# Patient Record
Sex: FEMALE | Race: WHITE | NOT HISPANIC OR LATINO | URBAN - METROPOLITAN AREA
[De-identification: names, ages, dates, MRNs, and addresses within clinical notes are randomized per-mention and may not be internally consistent; named-entity substitution may affect disease eponyms.]

---

## 2018-02-25 ENCOUNTER — EMERGENCY (EMERGENCY)
Facility: HOSPITAL | Age: 48
LOS: 0 days | Discharge: HOME | End: 2018-02-25

## 2018-02-25 VITALS
SYSTOLIC BLOOD PRESSURE: 160 MMHG | RESPIRATION RATE: 18 BRPM | TEMPERATURE: 98 F | HEART RATE: 98 BPM | DIASTOLIC BLOOD PRESSURE: 96 MMHG | OXYGEN SATURATION: 96 %

## 2018-02-25 DIAGNOSIS — K08.89 OTHER SPECIFIED DISORDERS OF TEETH AND SUPPORTING STRUCTURES: ICD-10-CM

## 2018-02-25 DIAGNOSIS — K05.10 CHRONIC GINGIVITIS, PLAQUE INDUCED: ICD-10-CM

## 2018-02-25 DIAGNOSIS — Z88.0 ALLERGY STATUS TO PENICILLIN: ICD-10-CM

## 2018-02-25 RX ORDER — OXYCODONE AND ACETAMINOPHEN 5; 325 MG/1; MG/1
1 TABLET ORAL ONCE
Qty: 0 | Refills: 0 | Status: DISCONTINUED | OUTPATIENT
Start: 2018-02-25 | End: 2018-02-25

## 2018-02-25 RX ORDER — IBUPROFEN 200 MG
1 TABLET ORAL
Qty: 15 | Refills: 0 | OUTPATIENT
Start: 2018-02-25 | End: 2018-03-01

## 2018-02-25 RX ORDER — CHLORHEXIDINE GLUCONATE 213 G/1000ML
15 SOLUTION TOPICAL
Qty: 1 | Refills: 0 | OUTPATIENT
Start: 2018-02-25 | End: 2018-03-01

## 2018-02-25 RX ORDER — AMOXICILLIN 250 MG/5ML
1 SUSPENSION, RECONSTITUTED, ORAL (ML) ORAL
Qty: 21 | Refills: 0 | OUTPATIENT
Start: 2018-02-25 | End: 2018-03-03

## 2018-02-25 RX ADMIN — OXYCODONE AND ACETAMINOPHEN 1 TABLET(S): 5; 325 TABLET ORAL at 10:39

## 2018-02-25 NOTE — ED PROVIDER NOTE - MEDICAL DECISION MAKING DETAILS
Patient has a private dentist that she will see tomorrow. Acute dental, no abscess, will treat pain meds/abx. Does not wish to have a dental block.    I have discussed the discharge plan with the patient. The patient agrees with the plan, as discussed.  The patient understands Emergency Department diagnosis is a preliminary diagnosis often based on limited information and that the patient must adhere to the follow-up plan as discussed.  The patient understands that if the symptoms worsen or if prescribed medications do not have the desired/planned effect that the patient may return to the Emergency Department at any time for further evaluation and treatment.

## 2018-02-25 NOTE — ED PROVIDER NOTE - NS ED ROS FT
CONST: No fever, chills or bodyaches  EYES: No pain, redness, drainage or visual changes.  ENT: see HPI  MS: No joint pain, back pain or extremity pain/injury  SKIN: No rashes

## 2018-02-25 NOTE — ED PROVIDER NOTE - OBJECTIVE STATEMENT
46yo female presents c/o L upper dental pain x 3 days, progressively worsening, minimally relieved by Naprosyn at home. Patient notes she reports her "gums feel swollen," and she cannot see her dentist until tomorrow. She denies fever, chills, trismus.

## 2018-02-25 NOTE — ED PROVIDER NOTE - PHYSICAL EXAMINATION
CONST: Well appearing in NAD  ENT: No nasal discharge. L upper tenderness along #15, 16 with mild gingivitis. No fx. No abscess  NECK: Non-tender, no meningeal signs  CARD: Normal S1 S2; Normal rate and rhythm  SKIN: Warm, dry, no acute rashes. Good turgor

## 2018-10-07 ENCOUNTER — EMERGENCY (EMERGENCY)
Facility: HOSPITAL | Age: 48
LOS: 1 days | Discharge: HOME | End: 2018-10-07
Attending: EMERGENCY MEDICINE

## 2018-12-23 ENCOUNTER — TRANSCRIPTION ENCOUNTER (OUTPATIENT)
Age: 48
End: 2018-12-23

## 2019-01-15 ENCOUNTER — EMERGENCY (EMERGENCY)
Facility: HOSPITAL | Age: 49
LOS: 0 days | Discharge: HOME | End: 2019-01-15
Attending: EMERGENCY MEDICINE | Admitting: EMERGENCY MEDICINE

## 2019-01-15 VITALS
HEART RATE: 96 BPM | TEMPERATURE: 97 F | SYSTOLIC BLOOD PRESSURE: 123 MMHG | WEIGHT: 207.9 LBS | DIASTOLIC BLOOD PRESSURE: 102 MMHG | RESPIRATION RATE: 20 BRPM | HEIGHT: 67 IN

## 2019-01-15 VITALS
HEART RATE: 75 BPM | SYSTOLIC BLOOD PRESSURE: 141 MMHG | DIASTOLIC BLOOD PRESSURE: 84 MMHG | TEMPERATURE: 97 F | OXYGEN SATURATION: 100 % | RESPIRATION RATE: 18 BRPM

## 2019-01-15 DIAGNOSIS — R07.9 CHEST PAIN, UNSPECIFIED: ICD-10-CM

## 2019-01-15 DIAGNOSIS — Z79.899 OTHER LONG TERM (CURRENT) DRUG THERAPY: ICD-10-CM

## 2019-01-15 DIAGNOSIS — Z79.2 LONG TERM (CURRENT) USE OF ANTIBIOTICS: ICD-10-CM

## 2019-01-15 DIAGNOSIS — I10 ESSENTIAL (PRIMARY) HYPERTENSION: ICD-10-CM

## 2019-01-15 DIAGNOSIS — E78.00 PURE HYPERCHOLESTEROLEMIA, UNSPECIFIED: ICD-10-CM

## 2019-01-15 DIAGNOSIS — Z79.84 LONG TERM (CURRENT) USE OF ORAL HYPOGLYCEMIC DRUGS: ICD-10-CM

## 2019-01-15 DIAGNOSIS — E11.9 TYPE 2 DIABETES MELLITUS WITHOUT COMPLICATIONS: ICD-10-CM

## 2019-01-15 DIAGNOSIS — Z79.1 LONG TERM (CURRENT) USE OF NON-STEROIDAL ANTI-INFLAMMATORIES (NSAID): ICD-10-CM

## 2019-01-15 LAB
ALBUMIN SERPL ELPH-MCNC: 4.2 G/DL — SIGNIFICANT CHANGE UP (ref 3.5–5.2)
ALP SERPL-CCNC: 62 U/L — SIGNIFICANT CHANGE UP (ref 30–115)
ALT FLD-CCNC: 17 U/L — SIGNIFICANT CHANGE UP (ref 0–41)
ANION GAP SERPL CALC-SCNC: 17 MMOL/L — HIGH (ref 7–14)
AST SERPL-CCNC: 12 U/L — SIGNIFICANT CHANGE UP (ref 0–41)
BASOPHILS # BLD AUTO: 0.02 K/UL — SIGNIFICANT CHANGE UP (ref 0–0.2)
BASOPHILS NFR BLD AUTO: 0.4 % — SIGNIFICANT CHANGE UP (ref 0–1)
BILIRUB SERPL-MCNC: <0.2 MG/DL — SIGNIFICANT CHANGE UP (ref 0.2–1.2)
BUN SERPL-MCNC: 10 MG/DL — SIGNIFICANT CHANGE UP (ref 10–20)
CALCIUM SERPL-MCNC: 8.9 MG/DL — SIGNIFICANT CHANGE UP (ref 8.5–10.1)
CHLORIDE SERPL-SCNC: 104 MMOL/L — SIGNIFICANT CHANGE UP (ref 98–110)
CO2 SERPL-SCNC: 22 MMOL/L — SIGNIFICANT CHANGE UP (ref 17–32)
CREAT SERPL-MCNC: 0.5 MG/DL — LOW (ref 0.7–1.5)
D DIMER BLD IA.RAPID-MCNC: 86 NG/ML DDU — SIGNIFICANT CHANGE UP (ref 0–230)
EOSINOPHIL # BLD AUTO: 0.12 K/UL — SIGNIFICANT CHANGE UP (ref 0–0.7)
EOSINOPHIL NFR BLD AUTO: 2.2 % — SIGNIFICANT CHANGE UP (ref 0–8)
GLUCOSE SERPL-MCNC: 114 MG/DL — HIGH (ref 70–99)
HCT VFR BLD CALC: 39.8 % — SIGNIFICANT CHANGE UP (ref 37–47)
HGB BLD-MCNC: 13.7 G/DL — SIGNIFICANT CHANGE UP (ref 12–16)
IMM GRANULOCYTES NFR BLD AUTO: 0.4 % — HIGH (ref 0.1–0.3)
LYMPHOCYTES # BLD AUTO: 1.5 K/UL — SIGNIFICANT CHANGE UP (ref 1.2–3.4)
LYMPHOCYTES # BLD AUTO: 27.3 % — SIGNIFICANT CHANGE UP (ref 20.5–51.1)
MCHC RBC-ENTMCNC: 30.8 PG — SIGNIFICANT CHANGE UP (ref 27–31)
MCHC RBC-ENTMCNC: 34.4 G/DL — SIGNIFICANT CHANGE UP (ref 32–37)
MCV RBC AUTO: 89.4 FL — SIGNIFICANT CHANGE UP (ref 81–99)
MONOCYTES # BLD AUTO: 0.28 K/UL — SIGNIFICANT CHANGE UP (ref 0.1–0.6)
MONOCYTES NFR BLD AUTO: 5.1 % — SIGNIFICANT CHANGE UP (ref 1.7–9.3)
NEUTROPHILS # BLD AUTO: 3.56 K/UL — SIGNIFICANT CHANGE UP (ref 1.4–6.5)
NEUTROPHILS NFR BLD AUTO: 64.6 % — SIGNIFICANT CHANGE UP (ref 42.2–75.2)
NRBC # BLD: 0 /100 WBCS — SIGNIFICANT CHANGE UP (ref 0–0)
PLATELET # BLD AUTO: 214 K/UL — SIGNIFICANT CHANGE UP (ref 130–400)
POTASSIUM SERPL-MCNC: 4.4 MMOL/L — SIGNIFICANT CHANGE UP (ref 3.5–5)
POTASSIUM SERPL-SCNC: 4.4 MMOL/L — SIGNIFICANT CHANGE UP (ref 3.5–5)
PROT SERPL-MCNC: 6.4 G/DL — SIGNIFICANT CHANGE UP (ref 6–8)
RBC # BLD: 4.45 M/UL — SIGNIFICANT CHANGE UP (ref 4.2–5.4)
RBC # FLD: 12.2 % — SIGNIFICANT CHANGE UP (ref 11.5–14.5)
SODIUM SERPL-SCNC: 143 MMOL/L — SIGNIFICANT CHANGE UP (ref 135–146)
TROPONIN T SERPL-MCNC: <0.01 NG/ML — SIGNIFICANT CHANGE UP
TROPONIN T SERPL-MCNC: <0.01 NG/ML — SIGNIFICANT CHANGE UP
WBC # BLD: 5.5 K/UL — SIGNIFICANT CHANGE UP (ref 4.8–10.8)
WBC # FLD AUTO: 5.5 K/UL — SIGNIFICANT CHANGE UP (ref 4.8–10.8)

## 2019-01-15 RX ORDER — NITROGLYCERIN 6.5 MG
0.4 CAPSULE, EXTENDED RELEASE ORAL ONCE
Qty: 0 | Refills: 0 | Status: COMPLETED | OUTPATIENT
Start: 2019-01-15 | End: 2019-01-15

## 2019-01-15 RX ORDER — METOPROLOL TARTRATE 50 MG
30 TABLET ORAL ONCE
Qty: 0 | Refills: 0 | Status: COMPLETED | OUTPATIENT
Start: 2019-01-15 | End: 2019-01-15

## 2019-01-15 RX ADMIN — Medication 30 MILLIGRAM(S): at 12:42

## 2019-01-15 RX ADMIN — Medication 0.4 MILLIGRAM(S): at 12:47

## 2019-01-15 NOTE — ED PROVIDER NOTE - ATTENDING CONTRIBUTION TO CARE
I personally evaluated the patient. I reviewed the Resident’s or Physician Assistant’s note (as assigned above), and agree with the findings and plan except as documented in my note.     48 female here for evaluation of chest pain last night, awoke her from sleep, associated with fatigue. Has prior eval with Dr. Olguin but no recent workup. Takes estradiol for prior hysterectomy status. Pt denies anginal equivalents and states pain improved at present.     PE: female in no distress. HEENT: EOMI PERRLA conjunctiva pink. CHEST: CTA bilateral. CV: pulses intact. SKIN: normal no pallor. EXT: no leg swelling.     Impression: chest pain    Plan: IV labs imaging supportive care and dispo to EDOU

## 2019-01-15 NOTE — ED ADULT NURSE NOTE - OBJECTIVE STATEMENT
Patient present to ED with complains of chest pain that started at 4am while sleeping woke up with pain, denies SOB, fever, chills, nausea, vomiting, diarrhea, and recent travel.

## 2019-01-15 NOTE — ED PROVIDER NOTE - MEDICAL DECISION MAKING DETAILS
48 female here for chest pain awoke from sleep initial ED workup labs xray ekg unremarkable, will dispo to EDOU for continued management.

## 2019-01-15 NOTE — ED PROVIDER NOTE - PROGRESS NOTE DETAILS
initial plan was EDOU for chest pain workup but imaging and labs resulted with change in disposition to formal discharge. No EDOU care.

## 2019-01-15 NOTE — ED PROVIDER NOTE - NSFOLLOWUPINSTRUCTIONS_ED_ALL_ED_FT
Follow up with your primary care doctor in 48 hours for re-evaluation and further treatment.    Chest Pain    Chest pain can be caused by many different conditions which may or may not be dangerous. Causes include heartburn, lung infections, heart attack, blood clot in lungs, skin infections, strain or damage to muscle, cartilage, or bones, etc. Lab tests or other studies including an electrocardiogram (EKG) may have been performed to find the cause of your pain. Make sure to follow up with a cardiologist or as instructed by your health care professional.    SEEK IMMEDIATE MEDICAL CARE IF YOU HAVE THE FOLLOWING SYMPTOMS: worsening chest pain, coughing up blood, unexplained back/neck/jaw pain, severe abdominal pain, dizziness or lightheadedness, shortness of breath, sweaty or clammy skin, vomiting, or racing heart beat. These symptoms may represent a serious problem that is an emergency. Do not wait to see if the symptoms will go away. Get medical help right away. Call your local emergency services (911 in the [place] Do not drive yourself to the hospital.

## 2019-01-15 NOTE — ED PROVIDER NOTE - NS ED ROS FT
Constitutional: No fevers/chills.    Head: No injury, headache.    Eyes:  No eye pain or discharge. No injury.    ENMT:  No pain, discharge or infections. No neck pain or stiffness. No loss ROM.    Cardiac:  (+) chest pain, (-) SOB or edema. No chest pain with exertion.    Respiratory:  No cough or respiratory distress.     GI:  No nausea, vomiting, diarrhea or abdominal pain. No anorexia. No change in PO intake.    :  No frequency, urgency or burning. No change in urine output.    MS:  No leg pain, leg swelling, myalgia, muscle weakness, joint pain or back pain. No loss ROM.    Neuro:  No dizziness or weakness.  No LOC.    Skin:  No skin rash.

## 2019-01-15 NOTE — ED PROVIDER NOTE - OBJECTIVE STATEMENT
49 yo female with PMHx HTN, HLD, DM presents for chest pain starting this morning at 4 am and feeling very tired. Patient had another episode a few days ago but ignored it. Patient states episode lasted approx 15-20 min and is now resolving. Patient has cardiologist Dr. Stokes but has not had cardiac work up. Patient/family denies fevers, SOB, abdominal pain, nausea, vomiting, diarrhea, constipation, dizziness, changes in PO intake, changes in urine output, changes in mental status.

## 2019-06-11 PROBLEM — Z00.00 ENCOUNTER FOR PREVENTIVE HEALTH EXAMINATION: Status: ACTIVE | Noted: 2019-06-11

## 2019-06-12 PROBLEM — E11.9 TYPE 2 DIABETES MELLITUS WITHOUT COMPLICATIONS: Chronic | Status: ACTIVE | Noted: 2019-01-15

## 2019-06-12 PROBLEM — E78.00 PURE HYPERCHOLESTEROLEMIA, UNSPECIFIED: Chronic | Status: ACTIVE | Noted: 2019-01-15

## 2019-06-12 PROBLEM — I10 ESSENTIAL (PRIMARY) HYPERTENSION: Chronic | Status: ACTIVE | Noted: 2019-01-15

## 2019-06-19 ENCOUNTER — APPOINTMENT (OUTPATIENT)
Dept: SURGERY | Facility: CLINIC | Age: 49
End: 2019-06-19
Payer: COMMERCIAL

## 2019-06-19 VITALS
HEIGHT: 63 IN | SYSTOLIC BLOOD PRESSURE: 146 MMHG | WEIGHT: 219 LBS | DIASTOLIC BLOOD PRESSURE: 92 MMHG | BODY MASS INDEX: 38.8 KG/M2

## 2019-06-19 DIAGNOSIS — Z87.2 PERSONAL HISTORY OF DISEASES OF THE SKIN AND SUBCUTANEOUS TISSUE: ICD-10-CM

## 2019-06-19 DIAGNOSIS — Z83.3 FAMILY HISTORY OF DIABETES MELLITUS: ICD-10-CM

## 2019-06-19 PROCEDURE — 99204 OFFICE O/P NEW MOD 45 MIN: CPT

## 2019-06-20 PROBLEM — Z87.2 HISTORY OF PILONIDAL CYST: Status: RESOLVED | Noted: 2019-06-20 | Resolved: 2019-06-20

## 2019-06-20 PROBLEM — Z83.3 FAMILY HISTORY OF DIABETES MELLITUS: Status: ACTIVE | Noted: 2019-06-20

## 2019-06-26 NOTE — ASSESSMENT
[FreeTextEntry1] : 50yo female with PMHx of DM (A1C >7), HLD, HTN, GERD, Anxiety, obesity with BMI 38.7 presenting for consultation of weight loss surgery/management.\par -discussed surgical options - gastric band, sleeve gastrectomy, kaia-en-Y gastric bypass\par -discussed potential surgical complications for each option - including bleeding, infection, pain, hernia, leak, stricture, and blood clots\par -discussed smoking is absolutely prohibited, including cigarettes, vaping and marijuana - will refer to [facility] with follow up in PCP\par -DM - A1C >7, needs better optimization\par -GERD - on Prilosec, will need EGD\par -anxiety - will need 2 visits with psych\par -at this time, patient is interested in GASTRIC BYPASS - I emphasized the extremely high risk of developing marginal ulcers in the setting of smoking \par -recommend high protein, low carb, low fat diet\par -encourage exercise regimen - starting with 30 minutes of cardio 3 times per week as well as resistance training\par -next step - bariatric education session at nearest convenience \par -pre-operative preparation - will include labs, PCP evaluation, EGD, nutritional evaluation (6 months), sleep study\par

## 2019-06-26 NOTE — PHYSICAL EXAM
[Obese] : obese [Normal] : affect appropriate [de-identified] : soft, obese, well-healed pfannenstiel incision, no rebound/guarding, no hernias, no masses [de-identified] : no CVA tenderness

## 2019-06-26 NOTE — HISTORY OF PRESENT ILLNESS
[de-identified] : 50yo female with PMHx of DM (A1C >7), HLD, HTN, GERD, Anxiety, obesity with BMI 38.7 presenting for consultation of weight loss surgery/management. Patient states she has struggled with her weight since childhood. She contributes her weight issues to emotional eating, portion control, and enjoying sweets and carbs. Previous weight loss attempts include Atkins, [place] diet, Nutrisystem and Weight Watchers with limited success. At this time, she does not have a regular exercise regimen. With regard to GERD, patient states it is controlled with Prilosec when she takes it. She has never had an EGD. She admits smoking cigarettes daily - 1ppd x 30 years.\par \par Previous EGD - never\par Smoker - current daily smoker, 30 pack-years

## 2019-06-26 NOTE — CONSULT LETTER
[Consult Letter:] : I had the pleasure of evaluating your patient, [unfilled]. [Please see my note below.] : Please see my note below. [Sincerely,] : Sincerely, [Dear  ___] : Dear  [unfilled], [FreeTextEntry3] : Kristin Antunez MD\par Bariatric & Minimally Invasive Surgery\par [facility]\par [phone]\par

## 2019-07-10 ENCOUNTER — APPOINTMENT (OUTPATIENT)
Dept: SURGERY | Facility: CLINIC | Age: 49
End: 2019-07-10
Payer: SELF-PAY

## 2019-07-10 VITALS
HEIGHT: 63 IN | DIASTOLIC BLOOD PRESSURE: 86 MMHG | WEIGHT: 213 LBS | SYSTOLIC BLOOD PRESSURE: 142 MMHG | BODY MASS INDEX: 37.74 KG/M2

## 2019-07-10 PROCEDURE — SI006: CPT

## 2019-07-30 ENCOUNTER — APPOINTMENT (OUTPATIENT)
Dept: SURGERY | Facility: CLINIC | Age: 49
End: 2019-07-30
Payer: COMMERCIAL

## 2019-07-30 VITALS — WEIGHT: 215 LBS | BODY MASS INDEX: 38.09 KG/M2 | HEIGHT: 63 IN

## 2019-07-30 PROCEDURE — 97802 MEDICAL NUTRITION INDIV IN: CPT

## 2019-07-30 PROCEDURE — 99214 OFFICE O/P EST MOD 30 MIN: CPT

## 2019-08-01 ENCOUNTER — LABORATORY RESULT (OUTPATIENT)
Age: 49
End: 2019-08-01

## 2019-08-01 ENCOUNTER — OUTPATIENT (OUTPATIENT)
Dept: OUTPATIENT SERVICES | Facility: HOSPITAL | Age: 49
LOS: 1 days | Discharge: HOME | End: 2019-08-01

## 2019-08-01 DIAGNOSIS — E66.01 MORBID (SEVERE) OBESITY DUE TO EXCESS CALORIES: ICD-10-CM

## 2019-08-01 DIAGNOSIS — D51.1 VITAMIN B12 DEFICIENCY ANEMIA DUE TO SELECTIVE VITAMIN B12 MALABSORPTION WITH PROTEINURIA: ICD-10-CM

## 2019-08-01 DIAGNOSIS — E56.9 VITAMIN DEFICIENCY, UNSPECIFIED: ICD-10-CM

## 2019-08-01 DIAGNOSIS — E07.9 DISORDER OF THYROID, UNSPECIFIED: ICD-10-CM

## 2019-08-01 DIAGNOSIS — D50.8 OTHER IRON DEFICIENCY ANEMIAS: ICD-10-CM

## 2019-08-01 DIAGNOSIS — E78.89 OTHER LIPOPROTEIN METABOLISM DISORDERS: ICD-10-CM

## 2019-08-01 DIAGNOSIS — D50.1 SIDEROPENIC DYSPHAGIA: ICD-10-CM

## 2019-08-01 DIAGNOSIS — K91.2 POSTSURGICAL MALABSORPTION, NOT ELSEWHERE CLASSIFIED: ICD-10-CM

## 2019-08-01 DIAGNOSIS — E55.9 VITAMIN D DEFICIENCY, UNSPECIFIED: ICD-10-CM

## 2019-08-01 DIAGNOSIS — E21.3 HYPERPARATHYROIDISM, UNSPECIFIED: ICD-10-CM

## 2019-08-01 DIAGNOSIS — E87.8 OTHER DISORDERS OF ELECTROLYTE AND FLUID BALANCE, NOT ELSEWHERE CLASSIFIED: ICD-10-CM

## 2019-08-05 ENCOUNTER — FORM ENCOUNTER (OUTPATIENT)
Age: 49
End: 2019-08-05

## 2019-08-06 ENCOUNTER — OUTPATIENT (OUTPATIENT)
Dept: OUTPATIENT SERVICES | Facility: HOSPITAL | Age: 49
LOS: 1 days | Discharge: HOME | End: 2019-08-06
Payer: COMMERCIAL

## 2019-08-06 DIAGNOSIS — E66.01 MORBID (SEVERE) OBESITY DUE TO EXCESS CALORIES: ICD-10-CM

## 2019-08-06 PROCEDURE — 76700 US EXAM ABDOM COMPLETE: CPT | Mod: 26

## 2019-08-20 ENCOUNTER — RESULT REVIEW (OUTPATIENT)
Age: 49
End: 2019-08-20

## 2019-08-20 LAB
ESTIMATED AVERAGE GLUCOSE: 157 MG/DL
HBA1C MFR BLD HPLC: 7.1 %
T3FREE SERPL-MCNC: 3.59 PG/ML
T4 FREE SERPL-MCNC: 1 NG/DL
TSH SERPL-ACNC: 4.3 UIU/ML

## 2019-08-22 ENCOUNTER — OUTPATIENT (OUTPATIENT)
Dept: OUTPATIENT SERVICES | Facility: HOSPITAL | Age: 49
LOS: 1 days | Discharge: HOME | End: 2019-08-22

## 2019-08-22 DIAGNOSIS — R00.2 PALPITATIONS: ICD-10-CM

## 2019-08-22 DIAGNOSIS — I05.9 RHEUMATIC MITRAL VALVE DISEASE, UNSPECIFIED: ICD-10-CM

## 2019-08-22 DIAGNOSIS — E66.9 OBESITY, UNSPECIFIED: ICD-10-CM

## 2019-08-22 DIAGNOSIS — G47.33 OBSTRUCTIVE SLEEP APNEA (ADULT) (PEDIATRIC): ICD-10-CM

## 2019-08-22 DIAGNOSIS — F17.200 NICOTINE DEPENDENCE, UNSPECIFIED, UNCOMPLICATED: ICD-10-CM

## 2019-08-26 ENCOUNTER — RESULT REVIEW (OUTPATIENT)
Age: 49
End: 2019-08-26

## 2019-08-27 ENCOUNTER — APPOINTMENT (OUTPATIENT)
Dept: SURGERY | Facility: CLINIC | Age: 49
End: 2019-08-27
Payer: COMMERCIAL

## 2019-08-27 ENCOUNTER — TRANSCRIPTION ENCOUNTER (OUTPATIENT)
Age: 49
End: 2019-08-27

## 2019-08-27 VITALS — WEIGHT: 217.6 LBS | BODY MASS INDEX: 38.55 KG/M2 | HEIGHT: 63 IN

## 2019-08-27 PROCEDURE — 99212 OFFICE O/P EST SF 10 MIN: CPT

## 2019-08-27 RX ORDER — UREA 10 %
50 LOTION (ML) TOPICAL DAILY
Refills: 0 | Status: ACTIVE | COMMUNITY
Start: 2019-08-27

## 2019-08-27 RX ORDER — ESTRADIOL 10 UG/1
TABLET, FILM COATED VAGINAL
Refills: 0 | Status: DISCONTINUED | COMMUNITY
End: 2019-08-27

## 2019-08-27 RX ORDER — AMLODIPINE BESYLATE 5 MG/1
5 TABLET ORAL DAILY
Refills: 0 | Status: ACTIVE | COMMUNITY
Start: 2019-08-27

## 2019-09-03 ENCOUNTER — OTHER (OUTPATIENT)
Age: 49
End: 2019-09-03

## 2019-09-25 NOTE — ED ADULT NURSE NOTE - NS ED NURSE ED ASSMT LEARNING IMPAIRMENTS
September 25, 2019      Renee Merino MD  [address]  [facility] [address]           [facility] Hematology Oncology  [address]  [facility] [address]  Phone: [phone]          Patient: Mar Jin   MR Number: [id number]   YOB: 1952   Date of Visit: 9/25/2019       Dear Dr. Renee Merino:    Thank you for referring Mar Jin to me for evaluation. Attached you will find relevant portions of my assessment and plan of care.    If you have questions, please do not hesitate to call me. I look forward to following Mar Jin along with you.    Sincerely,    Dawna Hay MD    Enclosure  CC:  No Recipients    If you would like to receive this communication electronically, please contact [email or web address] or [phone] to request more information on [employer] access.    For providers and/or their staff who would like to refer a patient to [facility], please contact us through our one-stop-shop provider referral line, [facility], at [phone].    If you feel you have received this communication in error or would no longer like to receive these types of communications, please e-mail [email or web address]         
None

## 2019-09-26 ENCOUNTER — APPOINTMENT (OUTPATIENT)
Dept: SURGERY | Facility: CLINIC | Age: 49
End: 2019-09-26
Payer: COMMERCIAL

## 2019-09-26 VITALS — HEIGHT: 63 IN | WEIGHT: 219 LBS | BODY MASS INDEX: 38.8 KG/M2

## 2019-09-26 PROCEDURE — 99212 OFFICE O/P EST SF 10 MIN: CPT

## 2019-09-30 ENCOUNTER — OTHER (OUTPATIENT)
Age: 49
End: 2019-09-30

## 2019-10-09 ENCOUNTER — OUTPATIENT (OUTPATIENT)
Dept: OUTPATIENT SERVICES | Facility: HOSPITAL | Age: 49
LOS: 1 days | Discharge: HOME | End: 2019-10-09

## 2019-10-09 DIAGNOSIS — E66.9 OBESITY, UNSPECIFIED: ICD-10-CM

## 2019-10-09 DIAGNOSIS — I05.9 RHEUMATIC MITRAL VALVE DISEASE, UNSPECIFIED: ICD-10-CM

## 2019-10-09 DIAGNOSIS — F17.200 NICOTINE DEPENDENCE, UNSPECIFIED, UNCOMPLICATED: ICD-10-CM

## 2019-10-09 DIAGNOSIS — R00.2 PALPITATIONS: ICD-10-CM

## 2019-10-09 DIAGNOSIS — I10 ESSENTIAL (PRIMARY) HYPERTENSION: ICD-10-CM

## 2019-10-14 ENCOUNTER — OUTPATIENT (OUTPATIENT)
Dept: OUTPATIENT SERVICES | Facility: HOSPITAL | Age: 49
LOS: 1 days | Discharge: HOME | End: 2019-10-14

## 2019-10-17 ENCOUNTER — APPOINTMENT (OUTPATIENT)
Dept: SURGERY | Facility: CLINIC | Age: 49
End: 2019-10-17
Payer: COMMERCIAL

## 2019-10-17 VITALS — BODY MASS INDEX: 38.8 KG/M2 | WEIGHT: 219 LBS | HEIGHT: 63 IN

## 2019-10-17 DIAGNOSIS — F17.200 NICOTINE DEPENDENCE, UNSPECIFIED, UNCOMPLICATED: ICD-10-CM

## 2019-10-17 PROCEDURE — 99212 OFFICE O/P EST SF 10 MIN: CPT

## 2019-10-19 ENCOUNTER — LABORATORY RESULT (OUTPATIENT)
Age: 49
End: 2019-10-19

## 2019-10-19 ENCOUNTER — OUTPATIENT (OUTPATIENT)
Dept: OUTPATIENT SERVICES | Facility: HOSPITAL | Age: 49
LOS: 1 days | Discharge: HOME | End: 2019-10-19

## 2019-10-19 DIAGNOSIS — D51.1 VITAMIN B12 DEFICIENCY ANEMIA DUE TO SELECTIVE VITAMIN B12 MALABSORPTION WITH PROTEINURIA: ICD-10-CM

## 2019-10-19 DIAGNOSIS — E07.9 DISORDER OF THYROID, UNSPECIFIED: ICD-10-CM

## 2019-10-19 DIAGNOSIS — E55.9 VITAMIN D DEFICIENCY, UNSPECIFIED: ICD-10-CM

## 2019-10-21 ENCOUNTER — RESULT REVIEW (OUTPATIENT)
Age: 49
End: 2019-10-21

## 2019-11-05 ENCOUNTER — APPOINTMENT (OUTPATIENT)
Dept: SURGERY | Facility: CLINIC | Age: 49
End: 2019-11-05
Payer: COMMERCIAL

## 2019-11-05 VITALS — HEIGHT: 63 IN | BODY MASS INDEX: 37.03 KG/M2 | WEIGHT: 209 LBS

## 2019-11-05 PROCEDURE — 99212 OFFICE O/P EST SF 10 MIN: CPT

## 2019-12-03 ENCOUNTER — APPOINTMENT (OUTPATIENT)
Dept: SURGERY | Facility: CLINIC | Age: 49
End: 2019-12-03
Payer: COMMERCIAL

## 2019-12-03 VITALS — BODY MASS INDEX: 37.21 KG/M2 | HEIGHT: 63 IN | WEIGHT: 210 LBS

## 2019-12-03 PROCEDURE — 99212 OFFICE O/P EST SF 10 MIN: CPT

## 2019-12-04 ENCOUNTER — OTHER (OUTPATIENT)
Age: 49
End: 2019-12-04

## 2019-12-04 RX ORDER — FENOFIBRATE 145 MG/1
145 TABLET, COATED ORAL DAILY
Refills: 0 | Status: ACTIVE | COMMUNITY
Start: 2019-12-04

## 2019-12-04 RX ORDER — LOSARTAN POTASSIUM 100 MG/1
100 TABLET, FILM COATED ORAL DAILY
Refills: 0 | Status: ACTIVE | COMMUNITY

## 2019-12-04 RX ORDER — DULOXETINE HYDROCHLORIDE 60 MG/1
60 CAPSULE, DELAYED RELEASE ORAL DAILY
Refills: 0 | Status: ACTIVE | COMMUNITY

## 2019-12-04 RX ORDER — OMEPRAZOLE 20 MG/1
20 CAPSULE, DELAYED RELEASE ORAL DAILY
Refills: 0 | Status: DISCONTINUED | COMMUNITY
End: 2019-12-04

## 2019-12-04 RX ORDER — COLD-HOT PACK
125 MCG EACH MISCELLANEOUS DAILY
Refills: 0 | Status: ACTIVE | COMMUNITY
Start: 2019-12-04

## 2019-12-04 RX ORDER — ERGOCALCIFEROL 1.25 MG/1
1.25 MG CAPSULE, LIQUID FILLED ORAL
Qty: 4 | Refills: 2 | Status: DISCONTINUED | COMMUNITY
Start: 2019-08-20 | End: 2019-12-04

## 2019-12-09 ENCOUNTER — OTHER (OUTPATIENT)
Age: 49
End: 2019-12-09

## 2019-12-15 ENCOUNTER — FORM ENCOUNTER (OUTPATIENT)
Age: 49
End: 2019-12-15

## 2019-12-16 ENCOUNTER — OUTPATIENT (OUTPATIENT)
Dept: OUTPATIENT SERVICES | Facility: HOSPITAL | Age: 49
LOS: 1 days | Discharge: HOME | End: 2019-12-16
Payer: COMMERCIAL

## 2019-12-16 VITALS
RESPIRATION RATE: 17 BRPM | SYSTOLIC BLOOD PRESSURE: 152 MMHG | HEART RATE: 88 BPM | OXYGEN SATURATION: 97 % | TEMPERATURE: 98 F | DIASTOLIC BLOOD PRESSURE: 81 MMHG | WEIGHT: 202.83 LBS | HEIGHT: 63 IN

## 2019-12-16 DIAGNOSIS — E66.01 MORBID (SEVERE) OBESITY DUE TO EXCESS CALORIES: ICD-10-CM

## 2019-12-16 DIAGNOSIS — Z98.890 OTHER SPECIFIED POSTPROCEDURAL STATES: Chronic | ICD-10-CM

## 2019-12-16 DIAGNOSIS — Z01.818 ENCOUNTER FOR OTHER PREPROCEDURAL EXAMINATION: ICD-10-CM

## 2019-12-16 DIAGNOSIS — Z90.710 ACQUIRED ABSENCE OF BOTH CERVIX AND UTERUS: Chronic | ICD-10-CM

## 2019-12-16 LAB
ALBUMIN SERPL ELPH-MCNC: 4.7 G/DL — SIGNIFICANT CHANGE UP (ref 3.5–5.2)
ALP SERPL-CCNC: 77 U/L — SIGNIFICANT CHANGE UP (ref 30–115)
ALT FLD-CCNC: 29 U/L — SIGNIFICANT CHANGE UP (ref 0–41)
ANION GAP SERPL CALC-SCNC: 17 MMOL/L — HIGH (ref 7–14)
APPEARANCE UR: CLEAR — SIGNIFICANT CHANGE UP
APTT BLD: 30.2 SEC — SIGNIFICANT CHANGE UP (ref 27–39.2)
AST SERPL-CCNC: 17 U/L — SIGNIFICANT CHANGE UP (ref 0–41)
BILIRUB SERPL-MCNC: 0.3 MG/DL — SIGNIFICANT CHANGE UP (ref 0.2–1.2)
BILIRUB UR-MCNC: NEGATIVE — SIGNIFICANT CHANGE UP
BLD GP AB SCN SERPL QL: SIGNIFICANT CHANGE UP
BUN SERPL-MCNC: 12 MG/DL — SIGNIFICANT CHANGE UP (ref 10–20)
CALCIUM SERPL-MCNC: 9.6 MG/DL — SIGNIFICANT CHANGE UP (ref 8.5–10.1)
CHLORIDE SERPL-SCNC: 100 MMOL/L — SIGNIFICANT CHANGE UP (ref 98–110)
CO2 SERPL-SCNC: 22 MMOL/L — SIGNIFICANT CHANGE UP (ref 17–32)
COLOR SPEC: YELLOW — SIGNIFICANT CHANGE UP
CREAT SERPL-MCNC: 0.6 MG/DL — LOW (ref 0.7–1.5)
DIFF PNL FLD: NEGATIVE — SIGNIFICANT CHANGE UP
ESTIMATED AVERAGE GLUCOSE: 306 MG/DL — HIGH (ref 68–114)
GLUCOSE SERPL-MCNC: 355 MG/DL — HIGH (ref 70–99)
GLUCOSE UR QL: ABNORMAL
HBA1C BLD-MCNC: 12.3 % — HIGH (ref 4–5.6)
HCT VFR BLD CALC: 43.8 % — SIGNIFICANT CHANGE UP (ref 37–47)
HGB BLD-MCNC: 14.7 G/DL — SIGNIFICANT CHANGE UP (ref 12–16)
INR BLD: 0.94 RATIO — SIGNIFICANT CHANGE UP (ref 0.65–1.3)
KETONES UR-MCNC: NEGATIVE — SIGNIFICANT CHANGE UP
LEUKOCYTE ESTERASE UR-ACNC: NEGATIVE — SIGNIFICANT CHANGE UP
MCHC RBC-ENTMCNC: 29.9 PG — SIGNIFICANT CHANGE UP (ref 27–31)
MCHC RBC-ENTMCNC: 33.6 G/DL — SIGNIFICANT CHANGE UP (ref 32–37)
MCV RBC AUTO: 89.2 FL — SIGNIFICANT CHANGE UP (ref 81–99)
NITRITE UR-MCNC: NEGATIVE — SIGNIFICANT CHANGE UP
NRBC # BLD: 0 /100 WBCS — SIGNIFICANT CHANGE UP (ref 0–0)
PH UR: 6.5 — SIGNIFICANT CHANGE UP (ref 5–8)
PLATELET # BLD AUTO: 262 K/UL — SIGNIFICANT CHANGE UP (ref 130–400)
POTASSIUM SERPL-MCNC: 4.6 MMOL/L — SIGNIFICANT CHANGE UP (ref 3.5–5)
POTASSIUM SERPL-SCNC: 4.6 MMOL/L — SIGNIFICANT CHANGE UP (ref 3.5–5)
PROT SERPL-MCNC: 6.9 G/DL — SIGNIFICANT CHANGE UP (ref 6–8)
PROT UR-MCNC: NEGATIVE — SIGNIFICANT CHANGE UP
PROTHROM AB SERPL-ACNC: 10.8 SEC — SIGNIFICANT CHANGE UP (ref 9.95–12.87)
RBC # BLD: 4.91 M/UL — SIGNIFICANT CHANGE UP (ref 4.2–5.4)
RBC # FLD: 12.4 % — SIGNIFICANT CHANGE UP (ref 11.5–14.5)
SODIUM SERPL-SCNC: 139 MMOL/L — SIGNIFICANT CHANGE UP (ref 135–146)
SP GR SPEC: 1.04 — HIGH (ref 1.01–1.02)
UROBILINOGEN FLD QL: SIGNIFICANT CHANGE UP
WBC # BLD: 5.18 K/UL — SIGNIFICANT CHANGE UP (ref 4.8–10.8)
WBC # FLD AUTO: 5.18 K/UL — SIGNIFICANT CHANGE UP (ref 4.8–10.8)

## 2019-12-16 PROCEDURE — 93010 ELECTROCARDIOGRAM REPORT: CPT

## 2019-12-16 PROCEDURE — 71046 X-RAY EXAM CHEST 2 VIEWS: CPT | Mod: 26

## 2019-12-16 RX ORDER — DULOXETINE HYDROCHLORIDE 30 MG/1
1 CAPSULE, DELAYED RELEASE ORAL
Qty: 0 | Refills: 0 | DISCHARGE

## 2019-12-16 RX ORDER — LOSARTAN/HYDROCHLOROTHIAZIDE 100MG-25MG
1 TABLET ORAL
Qty: 0 | Refills: 0 | DISCHARGE

## 2019-12-16 RX ORDER — METFORMIN HYDROCHLORIDE 850 MG/1
1 TABLET ORAL
Qty: 0 | Refills: 0 | DISCHARGE

## 2019-12-16 NOTE — H&P PST ADULT - REASON FOR ADMISSION
Pt denies cp palp uri cough dysuria or sob. ET 1 FOS denies SOB . PT denies any open wounds, drainage or rashes. Pt denies cp palp uri cough dysuria or sob. ET 1 FOS denies SOB . PT denies any open wounds, drainage or rashes. scheduled for lap gastric bypass possible open possible intra op endoscopy. pt has pmd dr elbert st today for pmd clearance. other scheduled evaluations on chart as well as per surgeon. pt has hx of QUINTON- DENIES CPAP. ADVISED HOLD METFORMIN DOP. PT COMPLIANT

## 2019-12-16 NOTE — H&P PST ADULT - NSICDXPASTMEDICALHX_GEN_ALL_CORE_FT
PAST MEDICAL HISTORY:  Anxiety     DM (diabetes mellitus)     GERD (gastroesophageal reflux disease)     High cholesterol     HTN (hypertension)     Obese     QUINTON (obstructive sleep apnea) oral device    Smoker not current

## 2019-12-18 ENCOUNTER — APPOINTMENT (OUTPATIENT)
Dept: SURGERY | Facility: CLINIC | Age: 49
End: 2019-12-18

## 2019-12-31 ENCOUNTER — APPOINTMENT (OUTPATIENT)
Dept: SURGERY | Facility: HOSPITAL | Age: 49
End: 2019-12-31

## 2020-01-08 ENCOUNTER — APPOINTMENT (OUTPATIENT)
Dept: SURGERY | Facility: CLINIC | Age: 50
End: 2020-01-08

## 2020-02-03 LAB
ESTIMATED AVERAGE GLUCOSE: 240 MG/DL
HBA1C MFR BLD HPLC: 10 %

## 2020-06-09 PROBLEM — F41.9 ANXIETY DISORDER, UNSPECIFIED: Chronic | Status: ACTIVE | Noted: 2019-12-16

## 2020-06-09 PROBLEM — G47.33 OBSTRUCTIVE SLEEP APNEA (ADULT) (PEDIATRIC): Chronic | Status: ACTIVE | Noted: 2019-12-16

## 2020-06-09 PROBLEM — E66.9 OBESITY, UNSPECIFIED: Chronic | Status: ACTIVE | Noted: 2019-12-16

## 2020-06-09 PROBLEM — K21.9 GASTRO-ESOPHAGEAL REFLUX DISEASE WITHOUT ESOPHAGITIS: Chronic | Status: ACTIVE | Noted: 2019-12-16

## 2020-06-17 VITALS — BODY MASS INDEX: 36.86 KG/M2 | HEIGHT: 63 IN | WEIGHT: 208 LBS

## 2020-06-22 ENCOUNTER — OUTPATIENT (OUTPATIENT)
Dept: OUTPATIENT SERVICES | Facility: HOSPITAL | Age: 50
LOS: 1 days | Discharge: HOME | End: 2020-06-22
Payer: COMMERCIAL

## 2020-06-22 VITALS
TEMPERATURE: 99 F | OXYGEN SATURATION: 100 % | HEIGHT: 63 IN | SYSTOLIC BLOOD PRESSURE: 145 MMHG | DIASTOLIC BLOOD PRESSURE: 69 MMHG | HEART RATE: 100 BPM | RESPIRATION RATE: 100 BRPM | WEIGHT: 205.91 LBS

## 2020-06-22 DIAGNOSIS — Z01.818 ENCOUNTER FOR OTHER PREPROCEDURAL EXAMINATION: ICD-10-CM

## 2020-06-22 DIAGNOSIS — Z90.710 ACQUIRED ABSENCE OF BOTH CERVIX AND UTERUS: Chronic | ICD-10-CM

## 2020-06-22 DIAGNOSIS — E66.01 MORBID (SEVERE) OBESITY DUE TO EXCESS CALORIES: ICD-10-CM

## 2020-06-22 DIAGNOSIS — Z98.890 OTHER SPECIFIED POSTPROCEDURAL STATES: Chronic | ICD-10-CM

## 2020-06-22 LAB
A1C WITH ESTIMATED AVERAGE GLUCOSE RESULT: 5.8 % — HIGH (ref 4–5.6)
ALBUMIN SERPL ELPH-MCNC: 5.3 G/DL — HIGH (ref 3.5–5.2)
ALP SERPL-CCNC: 39 U/L — SIGNIFICANT CHANGE UP (ref 30–115)
ALT FLD-CCNC: 32 U/L — SIGNIFICANT CHANGE UP (ref 0–41)
ANION GAP SERPL CALC-SCNC: 14 MMOL/L — SIGNIFICANT CHANGE UP (ref 7–14)
APPEARANCE UR: CLEAR — SIGNIFICANT CHANGE UP
APTT BLD: 33.8 SEC — SIGNIFICANT CHANGE UP (ref 27–39.2)
AST SERPL-CCNC: 28 U/L — SIGNIFICANT CHANGE UP (ref 0–41)
BACTERIA # UR AUTO: NEGATIVE — SIGNIFICANT CHANGE UP
BASOPHILS # BLD AUTO: 0.04 K/UL — SIGNIFICANT CHANGE UP (ref 0–0.2)
BASOPHILS NFR BLD AUTO: 0.6 % — SIGNIFICANT CHANGE UP (ref 0–1)
BILIRUB SERPL-MCNC: 0.2 MG/DL — SIGNIFICANT CHANGE UP (ref 0.2–1.2)
BILIRUB UR-MCNC: NEGATIVE — SIGNIFICANT CHANGE UP
BLD GP AB SCN SERPL QL: SIGNIFICANT CHANGE UP
BUN SERPL-MCNC: 23 MG/DL — HIGH (ref 10–20)
CALCIUM SERPL-MCNC: 10.9 MG/DL — HIGH (ref 8.5–10.1)
CHLORIDE SERPL-SCNC: 100 MMOL/L — SIGNIFICANT CHANGE UP (ref 98–110)
CO2 SERPL-SCNC: 27 MMOL/L — SIGNIFICANT CHANGE UP (ref 17–32)
COLOR SPEC: YELLOW — SIGNIFICANT CHANGE UP
CREAT SERPL-MCNC: 0.8 MG/DL — SIGNIFICANT CHANGE UP (ref 0.7–1.5)
DIFF PNL FLD: NEGATIVE — SIGNIFICANT CHANGE UP
EOSINOPHIL # BLD AUTO: 0.16 K/UL — SIGNIFICANT CHANGE UP (ref 0–0.7)
EOSINOPHIL NFR BLD AUTO: 2.3 % — SIGNIFICANT CHANGE UP (ref 0–8)
EPI CELLS # UR: 3 /HPF — SIGNIFICANT CHANGE UP (ref 0–5)
ESTIMATED AVERAGE GLUCOSE: 120 MG/DL — HIGH (ref 68–114)
GLUCOSE SERPL-MCNC: 105 MG/DL — HIGH (ref 70–99)
GLUCOSE UR QL: ABNORMAL
HCT VFR BLD CALC: 40.8 % — SIGNIFICANT CHANGE UP (ref 37–47)
HGB BLD-MCNC: 13.8 G/DL — SIGNIFICANT CHANGE UP (ref 12–16)
HYALINE CASTS # UR AUTO: 6 /LPF — SIGNIFICANT CHANGE UP (ref 0–7)
IMM GRANULOCYTES NFR BLD AUTO: 0.3 % — SIGNIFICANT CHANGE UP (ref 0.1–0.3)
INR BLD: 0.97 RATIO — SIGNIFICANT CHANGE UP (ref 0.65–1.3)
KETONES UR-MCNC: NEGATIVE — SIGNIFICANT CHANGE UP
LEUKOCYTE ESTERASE UR-ACNC: ABNORMAL
LYMPHOCYTES # BLD AUTO: 2.07 K/UL — SIGNIFICANT CHANGE UP (ref 1.2–3.4)
LYMPHOCYTES # BLD AUTO: 29.7 % — SIGNIFICANT CHANGE UP (ref 20.5–51.1)
MCHC RBC-ENTMCNC: 30.6 PG — SIGNIFICANT CHANGE UP (ref 27–31)
MCHC RBC-ENTMCNC: 33.8 G/DL — SIGNIFICANT CHANGE UP (ref 32–37)
MCV RBC AUTO: 90.5 FL — SIGNIFICANT CHANGE UP (ref 81–99)
MONOCYTES # BLD AUTO: 0.46 K/UL — SIGNIFICANT CHANGE UP (ref 0.1–0.6)
MONOCYTES NFR BLD AUTO: 6.6 % — SIGNIFICANT CHANGE UP (ref 1.7–9.3)
NEUTROPHILS # BLD AUTO: 4.23 K/UL — SIGNIFICANT CHANGE UP (ref 1.4–6.5)
NEUTROPHILS NFR BLD AUTO: 60.5 % — SIGNIFICANT CHANGE UP (ref 42.2–75.2)
NITRITE UR-MCNC: NEGATIVE — SIGNIFICANT CHANGE UP
NRBC # BLD: 0 /100 WBCS — SIGNIFICANT CHANGE UP (ref 0–0)
PH UR: 6.5 — SIGNIFICANT CHANGE UP (ref 5–8)
PLATELET # BLD AUTO: 337 K/UL — SIGNIFICANT CHANGE UP (ref 130–400)
POTASSIUM SERPL-MCNC: 5 MMOL/L — SIGNIFICANT CHANGE UP (ref 3.5–5)
POTASSIUM SERPL-SCNC: 5 MMOL/L — SIGNIFICANT CHANGE UP (ref 3.5–5)
PROT SERPL-MCNC: 7.8 G/DL — SIGNIFICANT CHANGE UP (ref 6–8)
PROT UR-MCNC: SIGNIFICANT CHANGE UP
PROTHROM AB SERPL-ACNC: 11.2 SEC — SIGNIFICANT CHANGE UP (ref 9.95–12.87)
RBC # BLD: 4.51 M/UL — SIGNIFICANT CHANGE UP (ref 4.2–5.4)
RBC # FLD: 13.2 % — SIGNIFICANT CHANGE UP (ref 11.5–14.5)
RBC CASTS # UR COMP ASSIST: 2 /HPF — SIGNIFICANT CHANGE UP (ref 0–4)
SODIUM SERPL-SCNC: 141 MMOL/L — SIGNIFICANT CHANGE UP (ref 135–146)
SP GR SPEC: 1.03 — HIGH (ref 1.01–1.02)
UROBILINOGEN FLD QL: SIGNIFICANT CHANGE UP
WBC # BLD: 6.98 K/UL — SIGNIFICANT CHANGE UP (ref 4.8–10.8)
WBC # FLD AUTO: 6.98 K/UL — SIGNIFICANT CHANGE UP (ref 4.8–10.8)
WBC UR QL: 5 /HPF — SIGNIFICANT CHANGE UP (ref 0–5)

## 2020-06-22 PROCEDURE — 93010 ELECTROCARDIOGRAM REPORT: CPT

## 2020-06-22 RX ORDER — ERGOCALCIFEROL 1.25 MG/1
0 CAPSULE ORAL
Qty: 0 | Refills: 0 | DISCHARGE

## 2020-06-22 NOTE — H&P PST ADULT - NSICDXPASTMEDICALHX_GEN_ALL_CORE_FT
PAST MEDICAL HISTORY:  Anxiety     Barretts syndrome     DM (diabetes mellitus)     GERD (gastroesophageal reflux disease)     High cholesterol     HTN (hypertension)     Obese     QUINTON (obstructive sleep apnea) oral device    Smoker not currentSTOPPED 8/30/19

## 2020-06-22 NOTE — H&P PST ADULT - HISTORY OF PRESENT ILLNESS
51 Y/O FEMALE SCHEDULED FOR WEIGHT LOSS SURG LAP GASTRIC BYPASS  REPORTS NO C/O CP,SOB,PALPITATIONS,COUGH OR DYSURIA  1-2 FOS WITHOUT SOB

## 2020-06-23 RX ORDER — PIOGLITAZONE HYDROCHLORIDE 30 MG/1
30 TABLET ORAL
Refills: 0 | Status: ACTIVE | COMMUNITY

## 2020-06-23 RX ORDER — CANAGLIFLOZIN 300 MG/1
300 TABLET, FILM COATED ORAL DAILY
Refills: 0 | Status: ACTIVE | COMMUNITY

## 2020-06-23 RX ORDER — ATORVASTATIN CALCIUM 20 MG/1
20 TABLET, FILM COATED ORAL
Refills: 0 | Status: ACTIVE | COMMUNITY

## 2020-06-24 ENCOUNTER — APPOINTMENT (OUTPATIENT)
Dept: SURGERY | Facility: CLINIC | Age: 50
End: 2020-06-24
Payer: COMMERCIAL

## 2020-06-24 VITALS
HEART RATE: 90 BPM | WEIGHT: 205 LBS | BODY MASS INDEX: 36.32 KG/M2 | SYSTOLIC BLOOD PRESSURE: 107 MMHG | HEIGHT: 63 IN | DIASTOLIC BLOOD PRESSURE: 78 MMHG | TEMPERATURE: 97.1 F

## 2020-06-24 PROCEDURE — 99243 OFF/OP CNSLTJ NEW/EST LOW 30: CPT

## 2020-06-26 ENCOUNTER — OUTPATIENT (OUTPATIENT)
Dept: OUTPATIENT SERVICES | Facility: HOSPITAL | Age: 50
LOS: 1 days | Discharge: HOME | End: 2020-06-26

## 2020-06-26 ENCOUNTER — LABORATORY RESULT (OUTPATIENT)
Age: 50
End: 2020-06-26

## 2020-06-26 DIAGNOSIS — Z90.710 ACQUIRED ABSENCE OF BOTH CERVIX AND UTERUS: Chronic | ICD-10-CM

## 2020-06-26 DIAGNOSIS — Z98.890 OTHER SPECIFIED POSTPROCEDURAL STATES: Chronic | ICD-10-CM

## 2020-06-26 NOTE — PHYSICAL EXAM
[Obese] : obese [Normal] : affect appropriate [de-identified] : soft, obese, well-healed pfannenstiel incision, no rebound/guarding, no hernias, no masses [de-identified] : no CVA tenderness

## 2020-06-26 NOTE — ASSESSMENT
[FreeTextEntry1] : 51yo female with hypertension, QUINTON, DM, HLD, GERD, honeycutt's esophagus and morbid obesity BMI 38 seen today for Preoperative Bariatric follow up visit. \par -evaluations by cardiology, gastroenterology, pulmonology, psychology and medicine have been reviewed\par -consent obtained for LAPAROSCOPIC SEUN-EN-Y GASTRIC BYPASS, POSSIBLE OPEN, POSSIBLE INTRA-OPERATIVE ENDOSCOPY\par -discussed with patient that surgery will be cancelled if COVID swab positive - scheduled for testing on Friday 6/26\par -discussed with patient that if intra-abdominal adhesions are extensive, surgery may be aborted. She is not a candidate for sleeve gastrectomy due to honeycutt's esophagus with dysplasia.\par -post-operative instructions reviewed - diet, wound care, concerning symptoms\par -All medications were reviewed with the patient and instructions were given in respect to medications on the day of surgery. Written instructions provided.\par -Rx sent for Lovenox 40mg Subcutaneous q12h due to history of COVID\par -Rx sent for Gabapentin 250 MG/5ML Oral Solution; TAKE 2.5 ML Every 8 hours\par -Rx sent for Pantoprazole Sodium 40mg PO delayed release daily\par -return to office post-operatively as scheduled\par -call with concerns

## 2020-06-26 NOTE — HISTORY OF PRESENT ILLNESS
[de-identified] : 49yo female with PMHx of DM, HLD, HTN, GERD, Anxiety, obesity with BMI 38.7 who entered [facility] surgical weight loss program 6/2019. She has undergone evaluation by GI, cardiologist, pulmonologist, psych, and nutritional evaluation. Planning for gastric bypass next week. Of noted, patient had COVID antibodies on PCR, reports symptoms from March.

## 2020-06-26 NOTE — CONSULT LETTER
[Please see my note below.] : Please see my note below. [Sincerely,] : Sincerely, [Dear  ___] : Dear  [unfilled], [FreeTextEntry3] : Kristin Antunez MD\par Bariatric & Minimally Invasive Surgery\par [facility]\par [phone]

## 2020-06-27 DIAGNOSIS — Z11.59 ENCOUNTER FOR SCREENING FOR OTHER VIRAL DISEASES: ICD-10-CM

## 2020-06-27 PROBLEM — K22.70 BARRETT'S ESOPHAGUS WITHOUT DYSPLASIA: Chronic | Status: ACTIVE | Noted: 2020-06-22

## 2020-06-27 PROBLEM — F17.200 NICOTINE DEPENDENCE, UNSPECIFIED, UNCOMPLICATED: Chronic | Status: ACTIVE | Noted: 2019-12-16

## 2020-06-29 ENCOUNTER — INPATIENT (INPATIENT)
Facility: HOSPITAL | Age: 50
LOS: 0 days | Discharge: HOME | End: 2020-06-30
Attending: STUDENT IN AN ORGANIZED HEALTH CARE EDUCATION/TRAINING PROGRAM | Admitting: STUDENT IN AN ORGANIZED HEALTH CARE EDUCATION/TRAINING PROGRAM
Payer: COMMERCIAL

## 2020-06-29 ENCOUNTER — APPOINTMENT (OUTPATIENT)
Dept: SURGERY | Facility: HOSPITAL | Age: 50
End: 2020-06-29

## 2020-06-29 ENCOUNTER — RESULT REVIEW (OUTPATIENT)
Age: 50
End: 2020-06-29

## 2020-06-29 VITALS
HEART RATE: 90 BPM | DIASTOLIC BLOOD PRESSURE: 78 MMHG | TEMPERATURE: 99 F | RESPIRATION RATE: 20 BRPM | WEIGHT: 199.96 LBS | HEIGHT: 63 IN | OXYGEN SATURATION: 99 % | SYSTOLIC BLOOD PRESSURE: 117 MMHG

## 2020-06-29 DIAGNOSIS — Z90.710 ACQUIRED ABSENCE OF BOTH CERVIX AND UTERUS: Chronic | ICD-10-CM

## 2020-06-29 DIAGNOSIS — Z98.890 OTHER SPECIFIED POSTPROCEDURAL STATES: Chronic | ICD-10-CM

## 2020-06-29 LAB
GLUCOSE BLDC GLUCOMTR-MCNC: 118 MG/DL — HIGH (ref 70–99)
GLUCOSE BLDC GLUCOMTR-MCNC: 90 MG/DL — SIGNIFICANT CHANGE UP (ref 70–99)

## 2020-06-29 PROCEDURE — 99233 SBSQ HOSP IP/OBS HIGH 50: CPT | Mod: 24

## 2020-06-29 PROCEDURE — 43644 LAP GASTRIC BYPASS/ROUX-EN-Y: CPT | Mod: 82

## 2020-06-29 PROCEDURE — 43644 LAP GASTRIC BYPASS/ROUX-EN-Y: CPT

## 2020-06-29 PROCEDURE — 93010 ELECTROCARDIOGRAM REPORT: CPT

## 2020-06-29 PROCEDURE — 88304 TISSUE EXAM BY PATHOLOGIST: CPT | Mod: 26

## 2020-06-29 RX ORDER — ACETAMINOPHEN 500 MG
1000 TABLET ORAL ONCE
Refills: 0 | Status: COMPLETED | OUTPATIENT
Start: 2020-06-29 | End: 2020-06-29

## 2020-06-29 RX ORDER — DEXTROSE 50 % IN WATER 50 %
25 SYRINGE (ML) INTRAVENOUS ONCE
Refills: 0 | Status: DISCONTINUED | OUTPATIENT
Start: 2020-06-29 | End: 2020-06-30

## 2020-06-29 RX ORDER — SODIUM CHLORIDE 9 MG/ML
1000 INJECTION, SOLUTION INTRAVENOUS
Refills: 0 | Status: DISCONTINUED | OUTPATIENT
Start: 2020-06-29 | End: 2020-06-30

## 2020-06-29 RX ORDER — KETOROLAC TROMETHAMINE 30 MG/ML
15 SYRINGE (ML) INJECTION EVERY 8 HOURS
Refills: 0 | Status: DISCONTINUED | OUTPATIENT
Start: 2020-06-29 | End: 2020-06-30

## 2020-06-29 RX ORDER — HEPARIN SODIUM 5000 [USP'U]/ML
5000 INJECTION INTRAVENOUS; SUBCUTANEOUS EVERY 8 HOURS
Refills: 0 | Status: DISCONTINUED | OUTPATIENT
Start: 2020-06-29 | End: 2020-06-30

## 2020-06-29 RX ORDER — MORPHINE SULFATE 50 MG/1
2 CAPSULE, EXTENDED RELEASE ORAL ONCE
Refills: 0 | Status: DISCONTINUED | OUTPATIENT
Start: 2020-06-29 | End: 2020-06-29

## 2020-06-29 RX ORDER — INSULIN LISPRO 100/ML
VIAL (ML) SUBCUTANEOUS
Refills: 0 | Status: DISCONTINUED | OUTPATIENT
Start: 2020-06-29 | End: 2020-06-29

## 2020-06-29 RX ORDER — GLUCAGON INJECTION, SOLUTION 0.5 MG/.1ML
1 INJECTION, SOLUTION SUBCUTANEOUS ONCE
Refills: 0 | Status: DISCONTINUED | OUTPATIENT
Start: 2020-06-29 | End: 2020-06-30

## 2020-06-29 RX ORDER — PANTOPRAZOLE SODIUM 20 MG/1
40 TABLET, DELAYED RELEASE ORAL DAILY
Refills: 0 | Status: DISCONTINUED | OUTPATIENT
Start: 2020-06-29 | End: 2020-06-30

## 2020-06-29 RX ORDER — BUPIVACAINE 13.3 MG/ML
20 INJECTION, SUSPENSION, LIPOSOMAL INFILTRATION ONCE
Refills: 0 | Status: COMPLETED | OUTPATIENT
Start: 2020-06-29 | End: 2020-06-29

## 2020-06-29 RX ORDER — CEFOTETAN DISODIUM 1 G
2 VIAL (EA) INJECTION EVERY 12 HOURS
Refills: 0 | Status: DISCONTINUED | OUTPATIENT
Start: 2020-06-29 | End: 2020-06-30

## 2020-06-29 RX ORDER — DEXTROSE 50 % IN WATER 50 %
15 SYRINGE (ML) INTRAVENOUS ONCE
Refills: 0 | Status: DISCONTINUED | OUTPATIENT
Start: 2020-06-29 | End: 2020-06-30

## 2020-06-29 RX ORDER — ONDANSETRON 8 MG/1
4 TABLET, FILM COATED ORAL ONCE
Refills: 0 | Status: DISCONTINUED | OUTPATIENT
Start: 2020-06-29 | End: 2020-06-29

## 2020-06-29 RX ORDER — SODIUM CHLORIDE 9 MG/ML
1000 INJECTION, SOLUTION INTRAVENOUS
Refills: 0 | Status: DISCONTINUED | OUTPATIENT
Start: 2020-06-29 | End: 2020-06-29

## 2020-06-29 RX ORDER — DEXTROSE 50 % IN WATER 50 %
12.5 SYRINGE (ML) INTRAVENOUS ONCE
Refills: 0 | Status: DISCONTINUED | OUTPATIENT
Start: 2020-06-29 | End: 2020-06-30

## 2020-06-29 RX ORDER — HEPARIN SODIUM 5000 [USP'U]/ML
5000 INJECTION INTRAVENOUS; SUBCUTANEOUS ONCE
Refills: 0 | Status: COMPLETED | OUTPATIENT
Start: 2020-06-29 | End: 2020-06-29

## 2020-06-29 RX ORDER — INSULIN HUMAN 100 [IU]/ML
INJECTION, SOLUTION SUBCUTANEOUS EVERY 4 HOURS
Refills: 0 | Status: DISCONTINUED | OUTPATIENT
Start: 2020-06-29 | End: 2020-06-30

## 2020-06-29 RX ORDER — MORPHINE SULFATE 50 MG/1
2 CAPSULE, EXTENDED RELEASE ORAL
Refills: 0 | Status: DISCONTINUED | OUTPATIENT
Start: 2020-06-29 | End: 2020-06-29

## 2020-06-29 RX ORDER — ONDANSETRON 8 MG/1
4 TABLET, FILM COATED ORAL EVERY 6 HOURS
Refills: 0 | Status: DISCONTINUED | OUTPATIENT
Start: 2020-06-29 | End: 2020-06-30

## 2020-06-29 RX ORDER — HYDROMORPHONE HYDROCHLORIDE 2 MG/ML
0.5 INJECTION INTRAMUSCULAR; INTRAVENOUS; SUBCUTANEOUS
Refills: 0 | Status: DISCONTINUED | OUTPATIENT
Start: 2020-06-29 | End: 2020-06-29

## 2020-06-29 RX ADMIN — HYDROMORPHONE HYDROCHLORIDE 0.5 MILLIGRAM(S): 2 INJECTION INTRAMUSCULAR; INTRAVENOUS; SUBCUTANEOUS at 13:02

## 2020-06-29 RX ADMIN — HYDROMORPHONE HYDROCHLORIDE 0.5 MILLIGRAM(S): 2 INJECTION INTRAMUSCULAR; INTRAVENOUS; SUBCUTANEOUS at 12:40

## 2020-06-29 RX ADMIN — MORPHINE SULFATE 2 MILLIGRAM(S): 50 CAPSULE, EXTENDED RELEASE ORAL at 14:33

## 2020-06-29 RX ADMIN — Medication 100 GRAM(S): at 20:10

## 2020-06-29 RX ADMIN — SODIUM CHLORIDE 125 MILLILITER(S): 9 INJECTION, SOLUTION INTRAVENOUS at 13:00

## 2020-06-29 RX ADMIN — HEPARIN SODIUM 5000 UNIT(S): 5000 INJECTION INTRAVENOUS; SUBCUTANEOUS at 07:16

## 2020-06-29 RX ADMIN — HYDROMORPHONE HYDROCHLORIDE 0.5 MILLIGRAM(S): 2 INJECTION INTRAMUSCULAR; INTRAVENOUS; SUBCUTANEOUS at 12:30

## 2020-06-29 RX ADMIN — MORPHINE SULFATE 2 MILLIGRAM(S): 50 CAPSULE, EXTENDED RELEASE ORAL at 12:09

## 2020-06-29 RX ADMIN — MORPHINE SULFATE 2 MILLIGRAM(S): 50 CAPSULE, EXTENDED RELEASE ORAL at 22:25

## 2020-06-29 RX ADMIN — HEPARIN SODIUM 5000 UNIT(S): 5000 INJECTION INTRAVENOUS; SUBCUTANEOUS at 22:25

## 2020-06-29 RX ADMIN — Medication 15 MILLIGRAM(S): at 14:14

## 2020-06-29 RX ADMIN — Medication 400 MILLIGRAM(S): at 19:14

## 2020-06-29 RX ADMIN — MORPHINE SULFATE 2 MILLIGRAM(S): 50 CAPSULE, EXTENDED RELEASE ORAL at 12:20

## 2020-06-29 RX ADMIN — HEPARIN SODIUM 5000 UNIT(S): 5000 INJECTION INTRAVENOUS; SUBCUTANEOUS at 13:59

## 2020-06-29 RX ADMIN — PANTOPRAZOLE SODIUM 40 MILLIGRAM(S): 20 TABLET, DELAYED RELEASE ORAL at 13:07

## 2020-06-29 RX ADMIN — Medication 15 MILLIGRAM(S): at 21:30

## 2020-06-29 RX ADMIN — SODIUM CHLORIDE 125 MILLILITER(S): 9 INJECTION, SOLUTION INTRAVENOUS at 17:12

## 2020-06-29 RX ADMIN — MORPHINE SULFATE 2 MILLIGRAM(S): 50 CAPSULE, EXTENDED RELEASE ORAL at 13:56

## 2020-06-29 RX ADMIN — SODIUM CHLORIDE 100 MILLILITER(S): 9 INJECTION, SOLUTION INTRAVENOUS at 12:00

## 2020-06-29 NOTE — CONSULT NOTE ADULT - ASSESSMENT
Assessment & Plan    49y/o Female, w/ PMH of obesity (BMI 35), QUINTON (w/ oral apparatus), HTN, DM, and others as listed above,  now POD #0 s/p lap gastric bypass    NEURO:   Pain - controlled with Tylenol, Toradol  Nausea controlled w/ Zofran    RESP:   RR: 17 (06-29) (11 - 25)  SpO2: 97% (06-29) (96% - 100%)      CARDS:       GI/NUTR:   Diet, NPO      /RENAL:   -Cr     HEME/ONC:   -H/H     ID:   -Afebrile T(C): 37.1 (06-29 @ 15:00), Max: 37.1 (06-29 @ 15:00)  - COVID neg 6/26, IGg positive 5/19/20      ENDO:  -Glu 90 mg/dL (06-29 @ 06:02)      LINES/DRAINS: Petrona KUMAR Foley     DISPO: SICU Assessment & Plan    49y/o Female, w/ PMH of obesity (BMI 35), QUINTON (w/ oral apparatus), HTN, DM, and others as listed above,  now POD #0 s/p lap gastric bypass    NEURO:   Pain - controlled with Tylenol, Toradol  Nausea controlled w/ Zofran    RESP:   On NC @ 3L, sat 95-97%  Hx of QUINTON (has oral apparatus, no CPAP)  AM CXR    CARDS:   Hx of HTN - holding home Amlodipine, Losartan while NPO  Maintain MAP > 65  EKG: NSR    GI/NUTR:   Diet: NPO  Hx of Rojas's -     /RENAL:   -Cr     HEME/ONC:   -H/H     ID:   -Afebrile T(C): 37.1 (06-29 @ 15:00), Max: 37.1 (06-29 @ 15:00)  - COVID neg 6/26, IGg positive 5/19/20      ENDO:  -Glu 90 mg/dL (06-29 @ 06:02)      LINES/DRAINS: STACY, Cristi Russ     DISPO: SICU Assessment & Plan    49y/o Female, w/ PMH of obesity (BMI 35), QUINTON (w/ oral apparatus), HTN, DM, and others as listed above,  now POD #0 s/p lap gastric bypass    NEURO:   Pain - controlled with Tylenol, Toradol  Nausea controlled w/ Zofran    RESP:   On NC @ 3L, sat 95-97%  Hx of QUINTON (has oral apparatus, no CPAP)  Hx of smoking, quit 8/2019 (30 pyh)  AM CXR    CARDS:   Hx of HTN - holding home Amlodipine, Losartan while NPO  Maintain MAP > 65  Hx of HLD - holding home statin while NPO  EKG: NSR    GI/NUTR:   Diet: NPO  Hx of Rojas's  GI ppx: PPI    /RENAL:   Raya placed intraop - strict I&Os - d/c raya at midnight as per primary team  f/u pm labs  Monitor & replete elytes prn    HEME/ONC:   f/u pm labs  DVT ppx: HSQ    ID:   Afebrile  f/u pm labs  COVID neg 6/26, IGg positive 5/19/20      ENDO:  Hx of DM, A1c 5.8 - holding home meds while NPO  on ISS, FS q4 while NPO      LINES/DRAINS: PIV, Raya     DISPO: SICU, d/w Dr. Garcia

## 2020-06-29 NOTE — CONSULT NOTE ADULT - ATTENDING COMMENTS
This is 49y/o female, w/ PMH of obesity (BMI 35), DM, HTN, HLD, QUINTON (w/ oral apparatus), Rojas's esophagus, ex-smoker (quit 8/2019, 30 pyh) presented to [facility] for elective lap gastric bypass. Procedure was without acute complications, patient remained hemodynamically stable throughout, and was successfully extubated.    ASSESSMENT:  51 y/o female with Morbid obesity.  S/P Lap. Gastric Bypass.  QUINTON.  HTN  Atelectasis.  Acute postoperative pain.    PLAN:  - pain control  - incentive spirometer  - OOB to chair  - keep normotensive  - DVT prophylaxis

## 2020-06-29 NOTE — CONSULT NOTE ADULT - SUBJECTIVE AND OBJECTIVE BOX
SICU Consultation Note  ======================================================================================================  STEVE PIERRE  [id number]    49y/o Female, w/ PMH of obesity (BMI 35), DM, HTN, HLD, QUINTON (w/ oral apparatus), Rojas's esophagus, ex-smoker (quit 8/2019, 30 pyh) presented to [facility] for elective lap sleeve gastrectomy. Procedure was without acute complications, patient remained hemodynamically stable throughout, and was successfully extubated.      Procedure: s/p lap sleeve gastrectomy  OR time: 3.5hrs     EBL: 40cc         IV Fluids: 1700cc       Blood Products: none               UOP: 400cc     Procedure Findings- hepatomegaly, omental adhesions, 60cm kaia, 60cm BP          PAST MEDICAL & SURGICAL HISTORY:  Barretts syndrome  Anxiety  Smoker: not currentSTOPPED 8/30/19  GERD (gastroesophageal reflux disease)  Obese  QUINTON (obstructive sleep apnea): oral device  High cholesterol  DM (diabetes mellitus)  HTN (hypertension)  H/O lumpectomy  S/P hysterectomy: 2015      Home Meds:   Home Medications:  amLODIPine 5 mg oral tablet: 1 tab(s) orally once a day (29 Jun 2020 06:19)  atorvastatin 10 mg oral tablet: 1 tab(s) orally once a day (29 Jun 2020 06:19)  DULoxetine 60 mg oral delayed release capsule: 1 cap(s) orally 2 times a day (29 Jun 2020 06:19)  fenofibrate 145 mg oral tablet: 1 tab(s) orally once a day (29 Jun 2020 06:19)  Invokana 300 mg oral tablet: 1 tab(s) orally once a day (29 Jun 2020 06:19)  losartan 100 mg oral tablet: 1 tab(s) orally once a day (29 Jun 2020 06:19)  metFORMIN 500 mg oral tablet: 1 tab(s) orally once a day (29 Jun 2020 06:19)  omeprazole 40 mg oral delayed release capsule: 1 cap(s) orally once a day (29 Jun 2020 06:19)  pioglitazone 30 mg oral tablet: 1 tab(s) orally once a day (29 Jun 2020 06:19)  Trulicity Pen 1.5 mg/0.5 mL subcutaneous solution: subcutaneous once a week (29 Jun 2020 06:19)      Allergies    No Known Allergies    Intolerances          Advanced Directives: Full Code      CURRENT MEDICATIONS:   acetaminophen  IVPB .. 1000 milliGRAM(s) IV Intermittent once  acetaminophen  IVPB .. 1000 milliGRAM(s) IV Intermittent once  BUpivacaine liposome 1.3% Injectable 20 milliLiter(s) Local Injection once  cefoTEtan  IVPB 2 Gram(s) IV Intermittent every 12 hours  dextrose 40% Gel 15 Gram(s) Oral once PRN  dextrose 5%. 1000 milliLiter(s) (50 mL/Hr) IV Continuous <Continuous>  dextrose 50% Injectable 12.5 Gram(s) IV Push once  dextrose 50% Injectable 25 Gram(s) IV Push once  dextrose 50% Injectable 25 Gram(s) IV Push once  glucagon  Injectable 1 milliGRAM(s) IntraMuscular once PRN  heparin   Injectable 5000 Unit(s) SubCutaneous every 8 hours  HYDROmorphone  Injectable 0.5 milliGRAM(s) IV Push every 10 minutes PRN  insulin lispro (HumaLOG) corrective regimen sliding scale   SubCutaneous three times a day before meals  ketorolac   Injectable 15 milliGRAM(s) IV Push every 8 hours PRN  lactated ringers. 1000 milliLiter(s) (125 mL/Hr) IV Continuous <Continuous>  lactated ringers. 1000 milliLiter(s) (100 mL/Hr) IV Continuous <Continuous>  ondansetron Injectable 4 milliGRAM(s) IV Push once PRN  ondansetron Injectable 4 milliGRAM(s) IV Push every 6 hours PRN  pantoprazole  Injectable 40 milliGRAM(s) IV Push daily            VITAL SIGNS, INS/OUTS (Last 24hours):    ICU Vital Signs Last 24 Hrs  T(C): 36.6 (29 Jun 2020 14:00), Max: 37 (29 Jun 2020 06:21)  T(F): 97.8 (29 Jun 2020 14:00), Max: 98.6 (29 Jun 2020 06:21)  HR: 101 (29 Jun 2020 14:30) (80 - 101)  BP: 148/80 (29 Jun 2020 14:30) (112/66 - 148/80)  BP(mean): 106 (29 Jun 2020 14:30) (84 - 106)  RR: 19 (29 Jun 2020 14:30) (11 - 25)  SpO2: 96% (29 Jun 2020 14:30) (96% - 100%)    I&O's Summary    29 Jun 2020 07:01  -  29 Jun 2020 14:41  --------------------------------------------------------  IN: 100 mL / OUT: 160 mL / NET: -60 mL          Height (cm): 160.02 (06-29-20)  Weight (kg): 90.7 (06-29-20)  BMI (kg/m2): 35.4 (06-29-20)  BSA (m2): 1.93 (06-29-20)    Physical Exam:  ---------------------------------------------------------------------------------------  Exam: A&Ox3, no focal deficits    RESPIRATORY:  On NC @ 3L, sat 95%  Lungs clear to auscultation b/l, Normal expansion/effort    CARDIOVASCULAR:  S1/S2.  RRR  No peripheral edema    GASTROINTESTINAL:  Abdomen soft, mildly tender, non-distended,   Port sites c/d/i, no erythema    MUSCULOSKELETAL:  Extremities warm, pink, well-perfused.    DERM:  No skin breakdown     :   Exam: Colin catheter in place.       Tubes/Lines/Drains   ----------------------------------------------------------------------------------------------------------  [x] Peripheral IV  [] Urinary Catheter Colin                                             Date Placed: 6/29/2020      LABS  --------------------------------------------------------------------------------------  LFTs      Cardiac Markers        Coagulation      Arterial Blood Gas      Blood Sugar  CAPILLARY BLOOD GLUCOSE      POCT Blood Glucose.: 90 mg/dL (29 Jun 2020 06:02)      Urinalysis      Cultures            CT/XRAY/ECHO/TCD/EEG  ----------------------------------------------------------------------------------------------          --------------------------------------------------------------------------------------  Admit Diagnosis: E66.01/35795     Critical Care Diagnoses: SICU Consultation Note  ======================================================================================================  STEVE PIERRE  MRN-[id number]    51y/o Female, w/ PMH of obesity (BMI 35), DM, HTN, HLD, QUINTON (w/ oral apparatus), Rojas's esophagus, ex-smoker (quit 8/2019, 30 pyh) presented to [facility] for elective lap sleeve gastrectomy. Procedure was without acute complications, patient remained hemodynamically stable throughout, and was successfully extubated.      Procedure: s/p lap sleeve gastrectomy  OR time: 3.5hrs     EBL: 40cc         IV Fluids: 1700cc       Blood Products: none               UOP: 400cc     Procedure Findings- hepatomegaly, omental adhesions, 60cm kaia, 60cm BP          PAST MEDICAL & SURGICAL HISTORY:  Barretts syndrome  Anxiety  Smoker: not currentSTOPPED 8/30/19  GERD (gastroesophageal reflux disease)  Obese  QUINTON (obstructive sleep apnea): oral device  High cholesterol  DM (diabetes mellitus)  HTN (hypertension)  H/O lumpectomy  S/P hysterectomy: 2015      Home Meds:   Home Medications:  amLODIPine 5 mg oral tablet: 1 tab(s) orally once a day (29 Jun 2020 06:19)  atorvastatin 10 mg oral tablet: 1 tab(s) orally once a day (29 Jun 2020 06:19)  DULoxetine 60 mg oral delayed release capsule: 1 cap(s) orally 2 times a day (29 Jun 2020 06:19)  fenofibrate 145 mg oral tablet: 1 tab(s) orally once a day (29 Jun 2020 06:19)  Invokana 300 mg oral tablet: 1 tab(s) orally once a day (29 Jun 2020 06:19)  losartan 100 mg oral tablet: 1 tab(s) orally once a day (29 Jun 2020 06:19)  metFORMIN 500 mg oral tablet: 1 tab(s) orally once a day (29 Jun 2020 06:19)  omeprazole 40 mg oral delayed release capsule: 1 cap(s) orally once a day (29 Jun 2020 06:19)  pioglitazone 30 mg oral tablet: 1 tab(s) orally once a day (29 Jun 2020 06:19)  Trulicity Pen 1.5 mg/0.5 mL subcutaneous solution: subcutaneous once a week (29 Jun 2020 06:19)      Allergies    No Known Allergies    Intolerances          Advanced Directives: Full Code      CURRENT MEDICATIONS:   acetaminophen  IVPB .. 1000 milliGRAM(s) IV Intermittent once  acetaminophen  IVPB .. 1000 milliGRAM(s) IV Intermittent once  BUpivacaine liposome 1.3% Injectable 20 milliLiter(s) Local Injection once  cefoTEtan  IVPB 2 Gram(s) IV Intermittent every 12 hours  dextrose 40% Gel 15 Gram(s) Oral once PRN  dextrose 5%. 1000 milliLiter(s) (50 mL/Hr) IV Continuous <Continuous>  dextrose 50% Injectable 12.5 Gram(s) IV Push once  dextrose 50% Injectable 25 Gram(s) IV Push once  dextrose 50% Injectable 25 Gram(s) IV Push once  glucagon  Injectable 1 milliGRAM(s) IntraMuscular once PRN  heparin   Injectable 5000 Unit(s) SubCutaneous every 8 hours  HYDROmorphone  Injectable 0.5 milliGRAM(s) IV Push every 10 minutes PRN  insulin lispro (HumaLOG) corrective regimen sliding scale   SubCutaneous three times a day before meals  ketorolac   Injectable 15 milliGRAM(s) IV Push every 8 hours PRN  lactated ringers. 1000 milliLiter(s) (125 mL/Hr) IV Continuous <Continuous>  lactated ringers. 1000 milliLiter(s) (100 mL/Hr) IV Continuous <Continuous>  ondansetron Injectable 4 milliGRAM(s) IV Push once PRN  ondansetron Injectable 4 milliGRAM(s) IV Push every 6 hours PRN  pantoprazole  Injectable 40 milliGRAM(s) IV Push daily            VITAL SIGNS, INS/OUTS (Last 24hours):    ICU Vital Signs Last 24 Hrs  T(C): 36.6 (29 Jun 2020 14:00), Max: 37 (29 Jun 2020 06:21)  T(F): 97.8 (29 Jun 2020 14:00), Max: 98.6 (29 Jun 2020 06:21)  HR: 101 (29 Jun 2020 14:30) (80 - 101)  BP: 148/80 (29 Jun 2020 14:30) (112/66 - 148/80)  BP(mean): 106 (29 Jun 2020 14:30) (84 - 106)  RR: 19 (29 Jun 2020 14:30) (11 - 25)  SpO2: 96% (29 Jun 2020 14:30) (96% - 100%)    I&O's Summary    29 Jun 2020 07:01  -  29 Jun 2020 14:41  --------------------------------------------------------  IN: 100 mL / OUT: 160 mL / NET: -60 mL          Height (cm): 160.02 (06-29-20)  Weight (kg): 90.7 (06-29-20)  BMI (kg/m2): 35.4 (06-29-20)  BSA (m2): 1.93 (06-29-20)    Physical Exam:  ---------------------------------------------------------------------------------------  Exam: A&Ox3, no focal deficits    RESPIRATORY:  On NC @ 3L, sat 95%  Lungs clear to auscultation b/l, Normal expansion/effort    CARDIOVASCULAR:  S1/S2.  RRR  No peripheral edema    GASTROINTESTINAL:  Abdomen soft, mildly tender, non-distended,   Port sites c/d/i, no erythema    MUSCULOSKELETAL:  Extremities warm, pink, well-perfused.    DERM:  No skin breakdown     :   Exam: Colin catheter in place.       Tubes/Lines/Drains   ----------------------------------------------------------------------------------------------------------  [x] Peripheral IV  [] Urinary Catheter Colin                                             Date Placed: 6/29/2020      LABS  --------------------------------------------------------------------------------------  LFTs      Cardiac Markers        Coagulation      Arterial Blood Gas      Blood Sugar  CAPILLARY BLOOD GLUCOSE      POCT Blood Glucose.: 90 mg/dL (29 Jun 2020 06:02)      Urinalysis      Cultures            RADIOLOGY  ----------------------------------------------------------------------------------------------          --------------------------------------------------------------------------------------  Admit Diagnosis: E66.01/20783     Critical Care Diagnoses: SICU Consultation Note  ======================================================================================================  STEVE PIERRE  MRN-[id number]    49y/o Female, w/ PMH of obesity (BMI 35), DM, HTN, HLD, QUINTON (w/ oral apparatus), Rojas's esophagus, ex-smoker (quit 8/2019, 30 pyh) presented to [facility] for elective lap sleeve gastrectomy. Procedure was without acute complications, patient remained hemodynamically stable throughout, and was successfully extubated.      Procedure: s/p lap gastric bypass w/ TAP block  OR time: 3.5hrs     EBL: 40cc         IV Fluids: 1700cc       Blood Products: none               UOP: 400cc     Procedure Findings- hepatomegaly, omental adhesions, 60cm kaia, 60cm BP          PAST MEDICAL & SURGICAL HISTORY:  Barretts syndrome  Anxiety  Smoker: not currentSTOPPED 8/30/19  GERD (gastroesophageal reflux disease)  Obese  QUINTON (obstructive sleep apnea): oral device  High cholesterol  DM (diabetes mellitus)  HTN (hypertension)  H/O lumpectomy  S/P hysterectomy: 2015      Home Meds:   Home Medications:  amLODIPine 5 mg oral tablet: 1 tab(s) orally once a day (29 Jun 2020 06:19)  atorvastatin 10 mg oral tablet: 1 tab(s) orally once a day (29 Jun 2020 06:19)  DULoxetine 60 mg oral delayed release capsule: 1 cap(s) orally 2 times a day (29 Jun 2020 06:19)  fenofibrate 145 mg oral tablet: 1 tab(s) orally once a day (29 Jun 2020 06:19)  Invokana 300 mg oral tablet: 1 tab(s) orally once a day (29 Jun 2020 06:19)  losartan 100 mg oral tablet: 1 tab(s) orally once a day (29 Jun 2020 06:19)  metFORMIN 500 mg oral tablet: 1 tab(s) orally once a day (29 Jun 2020 06:19)  omeprazole 40 mg oral delayed release capsule: 1 cap(s) orally once a day (29 Jun 2020 06:19)  pioglitazone 30 mg oral tablet: 1 tab(s) orally once a day (29 Jun 2020 06:19)  Trulicity Pen 1.5 mg/0.5 mL subcutaneous solution: subcutaneous once a week (29 Jun 2020 06:19)      Allergies    No Known Allergies    Intolerances          Advanced Directives: Full Code      CURRENT MEDICATIONS:   acetaminophen  IVPB .. 1000 milliGRAM(s) IV Intermittent once  acetaminophen  IVPB .. 1000 milliGRAM(s) IV Intermittent once  BUpivacaine liposome 1.3% Injectable 20 milliLiter(s) Local Injection once  cefoTEtan  IVPB 2 Gram(s) IV Intermittent every 12 hours  dextrose 40% Gel 15 Gram(s) Oral once PRN  dextrose 5%. 1000 milliLiter(s) (50 mL/Hr) IV Continuous <Continuous>  dextrose 50% Injectable 12.5 Gram(s) IV Push once  dextrose 50% Injectable 25 Gram(s) IV Push once  dextrose 50% Injectable 25 Gram(s) IV Push once  glucagon  Injectable 1 milliGRAM(s) IntraMuscular once PRN  heparin   Injectable 5000 Unit(s) SubCutaneous every 8 hours  HYDROmorphone  Injectable 0.5 milliGRAM(s) IV Push every 10 minutes PRN  insulin lispro (HumaLOG) corrective regimen sliding scale   SubCutaneous three times a day before meals  ketorolac   Injectable 15 milliGRAM(s) IV Push every 8 hours PRN  lactated ringers. 1000 milliLiter(s) (125 mL/Hr) IV Continuous <Continuous>  lactated ringers. 1000 milliLiter(s) (100 mL/Hr) IV Continuous <Continuous>  ondansetron Injectable 4 milliGRAM(s) IV Push once PRN  ondansetron Injectable 4 milliGRAM(s) IV Push every 6 hours PRN  pantoprazole  Injectable 40 milliGRAM(s) IV Push daily            VITAL SIGNS, INS/OUTS (Last 24hours):    ICU Vital Signs Last 24 Hrs  T(C): 36.6 (29 Jun 2020 14:00), Max: 37 (29 Jun 2020 06:21)  T(F): 97.8 (29 Jun 2020 14:00), Max: 98.6 (29 Jun 2020 06:21)  HR: 101 (29 Jun 2020 14:30) (80 - 101)  BP: 148/80 (29 Jun 2020 14:30) (112/66 - 148/80)  BP(mean): 106 (29 Jun 2020 14:30) (84 - 106)  RR: 19 (29 Jun 2020 14:30) (11 - 25)  SpO2: 96% (29 Jun 2020 14:30) (96% - 100%)    I&O's Summary    29 Jun 2020 07:01  -  29 Jun 2020 14:41  --------------------------------------------------------  IN: 100 mL / OUT: 160 mL / NET: -60 mL          Height (cm): 160.02 (06-29-20)  Weight (kg): 90.7 (06-29-20)  BMI (kg/m2): 35.4 (06-29-20)  BSA (m2): 1.93 (06-29-20)    Physical Exam:  ---------------------------------------------------------------------------------------  Exam: A&Ox3, no focal deficits    RESPIRATORY:  On NC @ 3L, sat 95%  Lungs clear to auscultation b/l, Normal expansion/effort    CARDIOVASCULAR:  S1/S2.  RRR  No peripheral edema    GASTROINTESTINAL:  Abdomen soft, mildly tender, non-distended,   Port sites c/d/i, no erythema    MUSCULOSKELETAL:  Extremities warm, pink, well-perfused.    DERM:  No skin breakdown     :   Exam: Colin catheter in place.       Tubes/Lines/Drains   ----------------------------------------------------------------------------------------------------------  [x] Peripheral IV  [] Urinary Catheter Colin                                             Date Placed: 6/29/2020      LABS  --------------------------------------------------------------------------------------  LFTs      Cardiac Markers        Coagulation      Arterial Blood Gas      Blood Sugar  CAPILLARY BLOOD GLUCOSE      POCT Blood Glucose.: 90 mg/dL (29 Jun 2020 06:02)      Urinalysis      Cultures            RADIOLOGY  ----------------------------------------------------------------------------------------------          --------------------------------------------------------------------------------------  Admit Diagnosis: E66.01/68461     Critical Care Diagnoses:

## 2020-06-30 ENCOUNTER — TRANSCRIPTION ENCOUNTER (OUTPATIENT)
Age: 50
End: 2020-06-30

## 2020-06-30 VITALS
DIASTOLIC BLOOD PRESSURE: 75 MMHG | TEMPERATURE: 99 F | RESPIRATION RATE: 18 BRPM | SYSTOLIC BLOOD PRESSURE: 145 MMHG | HEART RATE: 89 BPM

## 2020-06-30 LAB
ANION GAP SERPL CALC-SCNC: 13 MMOL/L — SIGNIFICANT CHANGE UP (ref 7–14)
BUN SERPL-MCNC: 17 MG/DL — SIGNIFICANT CHANGE UP (ref 10–20)
CALCIUM SERPL-MCNC: 8.9 MG/DL — SIGNIFICANT CHANGE UP (ref 8.5–10.1)
CHLORIDE SERPL-SCNC: 105 MMOL/L — SIGNIFICANT CHANGE UP (ref 98–110)
CO2 SERPL-SCNC: 22 MMOL/L — SIGNIFICANT CHANGE UP (ref 17–32)
CREAT SERPL-MCNC: 0.9 MG/DL — SIGNIFICANT CHANGE UP (ref 0.7–1.5)
GLUCOSE BLDC GLUCOMTR-MCNC: 109 MG/DL — HIGH (ref 70–99)
GLUCOSE BLDC GLUCOMTR-MCNC: 138 MG/DL — HIGH (ref 70–99)
GLUCOSE BLDC GLUCOMTR-MCNC: 139 MG/DL — HIGH (ref 70–99)
GLUCOSE BLDC GLUCOMTR-MCNC: 152 MG/DL — HIGH (ref 70–99)
GLUCOSE SERPL-MCNC: 149 MG/DL — HIGH (ref 70–99)
HCT VFR BLD CALC: 33.1 % — LOW (ref 37–47)
HGB BLD-MCNC: 11.2 G/DL — LOW (ref 12–16)
MAGNESIUM SERPL-MCNC: 2.1 MG/DL — SIGNIFICANT CHANGE UP (ref 1.8–2.4)
MCHC RBC-ENTMCNC: 30.7 PG — SIGNIFICANT CHANGE UP (ref 27–31)
MCHC RBC-ENTMCNC: 33.8 G/DL — SIGNIFICANT CHANGE UP (ref 32–37)
MCV RBC AUTO: 90.7 FL — SIGNIFICANT CHANGE UP (ref 81–99)
NRBC # BLD: 0 /100 WBCS — SIGNIFICANT CHANGE UP (ref 0–0)
PHOSPHATE SERPL-MCNC: 3.4 MG/DL — SIGNIFICANT CHANGE UP (ref 2.1–4.9)
PLATELET # BLD AUTO: 229 K/UL — SIGNIFICANT CHANGE UP (ref 130–400)
POTASSIUM SERPL-MCNC: 3.9 MMOL/L — SIGNIFICANT CHANGE UP (ref 3.5–5)
POTASSIUM SERPL-SCNC: 3.9 MMOL/L — SIGNIFICANT CHANGE UP (ref 3.5–5)
RBC # BLD: 3.65 M/UL — LOW (ref 4.2–5.4)
RBC # FLD: 13.4 % — SIGNIFICANT CHANGE UP (ref 11.5–14.5)
SODIUM SERPL-SCNC: 140 MMOL/L — SIGNIFICANT CHANGE UP (ref 135–146)
WBC # BLD: 7.59 K/UL — SIGNIFICANT CHANGE UP (ref 4.8–10.8)
WBC # FLD AUTO: 7.59 K/UL — SIGNIFICANT CHANGE UP (ref 4.8–10.8)

## 2020-06-30 PROCEDURE — 71045 X-RAY EXAM CHEST 1 VIEW: CPT | Mod: 26

## 2020-06-30 PROCEDURE — 99024 POSTOP FOLLOW-UP VISIT: CPT

## 2020-06-30 RX ORDER — ACETAMINOPHEN 500 MG
1000 TABLET ORAL ONCE
Refills: 0 | Status: COMPLETED | OUTPATIENT
Start: 2020-06-30 | End: 2020-06-30

## 2020-06-30 RX ORDER — MORPHINE SULFATE 50 MG/1
2 CAPSULE, EXTENDED RELEASE ORAL ONCE
Refills: 0 | Status: DISCONTINUED | OUTPATIENT
Start: 2020-06-30 | End: 2020-06-30

## 2020-06-30 RX ORDER — CHLORHEXIDINE GLUCONATE 213 G/1000ML
1 SOLUTION TOPICAL
Refills: 0 | Status: DISCONTINUED | OUTPATIENT
Start: 2020-06-30 | End: 2020-06-30

## 2020-06-30 RX ORDER — KETOROLAC TROMETHAMINE 30 MG/ML
15 SYRINGE (ML) INJECTION ONCE
Refills: 0 | Status: DISCONTINUED | OUTPATIENT
Start: 2020-06-30 | End: 2020-06-30

## 2020-06-30 RX ADMIN — HEPARIN SODIUM 5000 UNIT(S): 5000 INJECTION INTRAVENOUS; SUBCUTANEOUS at 13:11

## 2020-06-30 RX ADMIN — Medication 100 GRAM(S): at 05:29

## 2020-06-30 RX ADMIN — SODIUM CHLORIDE 125 MILLILITER(S): 9 INJECTION, SOLUTION INTRAVENOUS at 08:00

## 2020-06-30 RX ADMIN — Medication 400 MILLIGRAM(S): at 02:17

## 2020-06-30 RX ADMIN — INSULIN HUMAN 2: 100 INJECTION, SOLUTION SUBCUTANEOUS at 00:14

## 2020-06-30 RX ADMIN — Medication 15 MILLIGRAM(S): at 16:15

## 2020-06-30 RX ADMIN — HEPARIN SODIUM 5000 UNIT(S): 5000 INJECTION INTRAVENOUS; SUBCUTANEOUS at 05:31

## 2020-06-30 RX ADMIN — SODIUM CHLORIDE 125 MILLILITER(S): 9 INJECTION, SOLUTION INTRAVENOUS at 13:00

## 2020-06-30 RX ADMIN — Medication 400 MILLIGRAM(S): at 10:00

## 2020-06-30 RX ADMIN — PANTOPRAZOLE SODIUM 40 MILLIGRAM(S): 20 TABLET, DELAYED RELEASE ORAL at 13:11

## 2020-06-30 RX ADMIN — Medication 15 MILLIGRAM(S): at 04:32

## 2020-06-30 RX ADMIN — MORPHINE SULFATE 2 MILLIGRAM(S): 50 CAPSULE, EXTENDED RELEASE ORAL at 01:12

## 2020-06-30 NOTE — DISCHARGE NOTE PROVIDER - NSDCMRMEDTOKEN_GEN_ALL_CORE_FT
amLODIPine 5 mg oral tablet: 1 tab(s) orally once a day  atorvastatin 10 mg oral tablet: 1 tab(s) orally once a day  DULoxetine 60 mg oral delayed release capsule: 1 cap(s) orally 2 times a day  fenofibrate 145 mg oral tablet: 1 tab(s) orally once a day  Invokana 300 mg oral tablet: 1 tab(s) orally once a day  losartan 100 mg oral tablet: 1 tab(s) orally once a day  metFORMIN 500 mg oral tablet: 1 tab(s) orally once a day  omeprazole 40 mg oral delayed release capsule: 1 cap(s) orally once a day  pioglitazone 30 mg oral tablet: 1 tab(s) orally once a day  Trulicity Pen 1.5 mg/0.5 mL subcutaneous solution: subcutaneous once a week

## 2020-06-30 NOTE — PROGRESS NOTE ADULT - ASSESSMENT
50F w/PMHx of DM, HTN, GERD, HLD, Obesity is s/p laparoscopic gatric bypass with postoperative tachycardia     Plan:   -Tachycardia likely 2/2 to pain control, adequate pain regimen   -NPO, IVF   -Likely advance diet today if continues to have no nausea or vomiting

## 2020-06-30 NOTE — PROGRESS NOTE ADULT - SUBJECTIVE AND OBJECTIVE BOX
STEVE PIERRE  [id number]  50y Female    Indication for ICU admission:  Admit Date:06-29-20  ICU Date:  OR Date:    No Known Allergies    PAST MEDICAL & SURGICAL HISTORY:  Barretts syndrome  Anxiety  Smoker: not currentSTOPPED 8/30/19  GERD (gastroesophageal reflux disease)  Obese  QUINTON (obstructive sleep apnea): oral device  High cholesterol  DM (diabetes mellitus)  HTN (hypertension)  H/O lumpectomy  S/P hysterectomy: 2015    Home Medications:  amLODIPine 5 mg oral tablet: 1 tab(s) orally once a day (29 Jun 2020 06:19)  atorvastatin 10 mg oral tablet: 1 tab(s) orally once a day (29 Jun 2020 06:19)  DULoxetine 60 mg oral delayed release capsule: 1 cap(s) orally 2 times a day (29 Jun 2020 06:19)  fenofibrate 145 mg oral tablet: 1 tab(s) orally once a day (29 Jun 2020 06:19)  Invokana 300 mg oral tablet: 1 tab(s) orally once a day (29 Jun 2020 06:19)  losartan 100 mg oral tablet: 1 tab(s) orally once a day (29 Jun 2020 06:19)  metFORMIN 500 mg oral tablet: 1 tab(s) orally once a day (29 Jun 2020 06:19)  omeprazole 40 mg oral delayed release capsule: 1 cap(s) orally once a day (29 Jun 2020 06:19)  pioglitazone 30 mg oral tablet: 1 tab(s) orally once a day (29 Jun 2020 06:19)  Trulicity Pen 1.5 mg/0.5 mL subcutaneous solution: subcutaneous once a week (29 Jun 2020 06:19)        24HRS EVENT:  1d            DVT Prophylaxis: heparin   Injectable 5000 Unit(s) SubCutaneous every 8 hours      GI Prophylaxis:pantoprazole  Injectable 40 milliGRAM(s) IV Push daily      ***Tubes/Lines/Drains  ***  Peripheral IV  Arterial Line		                  Central Line                            Urinary Catheter		Indication: Strict I&O          [X] A ten-point review of systems was otherwise negative except as noted above.  [  ] Due to altered mental status/intubation, subjective information was not attained from the patient. History was obtained, to the extent possible, from review of the chart and collateral sources of information. STEVE PIERRE  [id number]  50y Female    Indication for ICU admission:  Admit Date:06-29-20  ICU Date: 6/29/20  OR Date: 6/29/20    No Known Allergies    PAST MEDICAL & SURGICAL HISTORY:  Barretts syndrome  Anxiety  Smoker: not currentSTOPPED 8/30/19  GERD (gastroesophageal reflux disease)  Obese  QUINTON (obstructive sleep apnea): oral device  High cholesterol  DM (diabetes mellitus)  HTN (hypertension)  H/O lumpectomy  S/P hysterectomy: 2015    Home Medications:  amLODIPine 5 mg oral tablet: 1 tab(s) orally once a day (29 Jun 2020 06:19)  atorvastatin 10 mg oral tablet: 1 tab(s) orally once a day (29 Jun 2020 06:19)  DULoxetine 60 mg oral delayed release capsule: 1 cap(s) orally 2 times a day (29 Jun 2020 06:19)  fenofibrate 145 mg oral tablet: 1 tab(s) orally once a day (29 Jun 2020 06:19)  Invokana 300 mg oral tablet: 1 tab(s) orally once a day (29 Jun 2020 06:19)  losartan 100 mg oral tablet: 1 tab(s) orally once a day (29 Jun 2020 06:19)  metFORMIN 500 mg oral tablet: 1 tab(s) orally once a day (29 Jun 2020 06:19)  omeprazole 40 mg oral delayed release capsule: 1 cap(s) orally once a day (29 Jun 2020 06:19)  pioglitazone 30 mg oral tablet: 1 tab(s) orally once a day (29 Jun 2020 06:19)  Trulicity Pen 1.5 mg/0.5 mL subcutaneous solution: subcutaneous once a week (29 Jun 2020 06:19)        NEURO:   Pain - controlled with IV Tylenol, Toradol  Nausea controlled w/ Zofran    RESP:   On NC @ 3L, sat 95-97%  Hx of QUINTON (has oral apparatus, no CPAP)  Hx of smoking, quit 8/2019 (30 pyh)  AM CXR    CARDS:  Tachycardia likely 2/2 pain control  Hx of HTN - holding home Amlodipine, Losartan while NPO  Maintain MAP > 65  Hx of HLD - holding home statin while NPO  EKG: NSR    GI/NUTR:   Diet: NPO, advance diet today if no nausea or vomiting  Hx of Rojas's  GI ppx: PPI    /RENAL:   d/c raya  Monitor electrolytes and replete PRN  Na 140, K 3.9, Mg 2.1, Phos 3.4    HEME/ONC:   f/u pm labs  DVT ppx: HSQ    ID:   Afebrile  f/u pm labs  COVID neg 6/26, IGg positive 5/19/20      ENDO:  Hx of DM, A1c 5.8 - holding home meds while NPO  on ISS, FS q4 while NPO      LINES/DRAINS: PIV        DVT PTX: HSQ    [X] A ten-point review of systems was otherwise negative except as noted above.  [  ] Due to altered mental status/intubation, subjective information was not attained from the patient. History was obtained, to the extent possible, from review of the chart and collateral sources of information. STEVE PIERRE  [id number]  51y/o Female, w/ PMH of obesity (BMI 35), DM, HTN, HLD, QUINTON (w/ oral apparatus), Rojas's esophagus, ex-smoker (quit 8/2019, 30 pyh) presented to [facility] for elective lap sleeve gastrectomy. Procedure was without acute complications, patient remained hemodynamically stable throughout, and was successfully extubated.    Indication for ICU admission:  Admit Date:06-29-20  ICU Date: 6/29/20  OR Date: 6/29/20    No Known Allergies    PAST MEDICAL & SURGICAL HISTORY:  Barretts syndrome  Anxiety  Smoker: not currentSTOPPED 8/30/19  GERD (gastroesophageal reflux disease)  Obese  QUINTON (obstructive sleep apnea): oral device  High cholesterol  DM (diabetes mellitus)  HTN (hypertension)  H/O lumpectomy  S/P hysterectomy: 2015    Home Medications:  amLODIPine 5 mg oral tablet: 1 tab(s) orally once a day (29 Jun 2020 06:19)  atorvastatin 10 mg oral tablet: 1 tab(s) orally once a day (29 Jun 2020 06:19)  DULoxetine 60 mg oral delayed release capsule: 1 cap(s) orally 2 times a day (29 Jun 2020 06:19)  fenofibrate 145 mg oral tablet: 1 tab(s) orally once a day (29 Jun 2020 06:19)  Invokana 300 mg oral tablet: 1 tab(s) orally once a day (29 Jun 2020 06:19)  losartan 100 mg oral tablet: 1 tab(s) orally once a day (29 Jun 2020 06:19)  metFORMIN 500 mg oral tablet: 1 tab(s) orally once a day (29 Jun 2020 06:19)  omeprazole 40 mg oral delayed release capsule: 1 cap(s) orally once a day (29 Jun 2020 06:19)  pioglitazone 30 mg oral tablet: 1 tab(s) orally once a day (29 Jun 2020 06:19)  Trulicity Pen 1.5 mg/0.5 mL subcutaneous solution: subcutaneous once a week (29 Jun 2020 06:19)    Overnight Events: raya d/c at midnight, postoperative tachycardia  likely secondary to pain control, given morphine 2 mg and on IV tylenol, toradol    NEURO:   Pain - controlled with IV Tylenol, Toradol,   Nausea controlled w/ Zofran    RESP:   On NC @ 3L, sat 95-97%  Hx of QUINTON (has oral apparatus, no CPAP)  Hx of smoking, quit 8/2019 (30 pyh)  AM CXR    CARDS:  Tachycardia likely 2/2 pain control  Hx of HTN - holding home Amlodipine, Losartan while NPO  Maintain MAP > 65  Hx of HLD - holding home statin while NPO  EKG: NSR    GI/NUTR:   Diet: NPO, advance diet today if no nausea or vomiting  Hx of Rojas's  GI ppx: PPI    /RENAL:   d/c raya  Monitor electrolytes and replete PRN  Na 140, K 3.9, Mg 2.1, Phos 3.4    HEME/ONC:   f/u pm labs  DVT ppx: HSQ    ID:   Afebrile  f/u pm labs  COVID neg 6/26, IGg positive 5/19/20      ENDO:  Hx of DM, A1c 5.8 - holding home meds while NPO  on ISS, FS q4 while NPO      LINES/DRAINS: PIV        DVT PTX: HSQ    [X] A ten-point review of systems was otherwise negative except as noted above.  [  ] Due to altered mental status/intubation, subjective information was not attained from the patient. History was obtained, to the extent possible, from review of the chart and collateral sources of information. STEVE PIERRE  [id number]  49y/o Female, w/ PMH of obesity (BMI 35), DM, HTN, HLD, QUINTON (w/ oral apparatus), Rojas's esophagus, ex-smoker (quit 2019, 30 pyh) presented to [facility] for elective lap sleeve gastrectomy. Procedure was without acute complications, patient remained hemodynamically stable throughout, and was successfully extubated.    Indication for ICU admission:  Admit Date:20  ICU Date: 20  OR Date: 20    No Known Allergies    PAST MEDICAL & SURGICAL HISTORY:  Barretts syndrome  Anxiety  Smoker: not currentSTOPPED 19  GERD (gastroesophageal reflux disease)  Obese  QUINTON (obstructive sleep apnea): oral device  High cholesterol  DM (diabetes mellitus)  HTN (hypertension)  H/O lumpectomy  S/P hysterectomy:     Home Medications:  amLODIPine 5 mg oral tablet: 1 tab(s) orally once a day (2020 06:19)  atorvastatin 10 mg oral tablet: 1 tab(s) orally once a day (:19)  DULoxetine 60 mg oral delayed release capsule: 1 cap(s) orally 2 times a day (:19)  fenofibrate 145 mg oral tablet: 1 tab(s) orally once a day (:19)  Invokana 300 mg oral tablet: 1 tab(s) orally once a day (:19)  losartan 100 mg oral tablet: 1 tab(s) orally once a day (:19)  metFORMIN 500 mg oral tablet: 1 tab(s) orally once a day (:19)  omeprazole 40 mg oral delayed release capsule: 1 cap(s) orally once a day (:19)  pioglitazone 30 mg oral tablet: 1 tab(s) orally once a day (:19)  Trulicity Pen 1.5 mg/0.5 mL subcutaneous solution: subcutaneous once a week (:19)    Overnight Events: raya d/c at midnight, postoperative tachycardia  likely secondary to pain control, given morphine 2 mg and on IV tylenol, toradol    NEURO:   Pain - controlled with IV Tylenol, Toradol,   Nausea controlled w/ Zofran    RESP:   On NC @ 3L, sat 95-97%  Hx of QUINTON (has oral apparatus, no CPAP)  Hx of smoking, quit 2019 (30 pyh)  AM CXR    CARDS:  Tachycardia likely 2/2 pain control  Hx of HTN - holding home Amlodipine, Losartan while NPO  Maintain MAP > 65  Hx of HLD - holding home statin while NPO  EKG: NSR    GI/NUTR:   Diet: NPO, advance diet today if no nausea or vomiting  Hx of Rojas's  GI ppx: PPI    /RENAL:   d/c raya  Monitor electrolytes and replete PRN  Na 140, K 3.9, Mg 2.1, Phos 3.4    HEME/ONC:   f/u pm labs  DVT ppx: HSQ    ID:   Afebrile  f/u pm labs  COVID neg , IGg positive 20      ENDO:  Hx of DM, A1c 5.8 - holding home meds while NPO  on ISS, FS q4 while NPO      LINES/DRAINS: PIV        DVT PTX: HSQ    [X] A ten-point review of systems was otherwise negative except as noted above.  [  ] Due to altered mental status/intubation, subjective information was not attained from the patient. History was obtained, to the extent possible, from review of the chart and collateral sources of information.    Daily Height in cm: 160.02 (2020 07:14)    Daily Weight in k.4 (2020 16:00)    Diet, NPO (20 @ 12:07)      CURRENT MEDS:  Neurologic Medications  acetaminophen  IVPB .. 1000 milliGRAM(s) IV Intermittent once  ketorolac   Injectable 15 milliGRAM(s) IV Push every 8 hours PRN Moderate Pain (4 - 6)  ondansetron Injectable 4 milliGRAM(s) IV Push every 6 hours PRN Nausea    Respiratory Medications    Cardiovascular Medications    Gastrointestinal Medications  dextrose 5%. 1000 milliLiter(s) IV Continuous <Continuous>  lactated ringers. 1000 milliLiter(s) IV Continuous <Continuous>  pantoprazole  Injectable 40 milliGRAM(s) IV Push daily    Genitourinary Medications    Hematologic/Oncologic Medications  heparin   Injectable 5000 Unit(s) SubCutaneous every 8 hours    Antimicrobial/Immunologic Medications  cefoTEtan  IVPB 2 Gram(s) IV Intermittent every 12 hours    Endocrine/Metabolic Medications  dextrose 40% Gel 15 Gram(s) Oral once PRN Blood Glucose LESS THAN 70 milliGRAM(s)/deciliter  dextrose 50% Injectable 12.5 Gram(s) IV Push once  dextrose 50% Injectable 25 Gram(s) IV Push once  dextrose 50% Injectable 25 Gram(s) IV Push once  glucagon  Injectable 1 milliGRAM(s) IntraMuscular once PRN Glucose LESS THAN 70 milligrams/deciliter  insulin regular  human corrective regimen sliding scale   IV Push every 4 hours    Topical/Other Medications  BUpivacaine liposome 1.3% Injectable 20 milliLiter(s) Local Injection once  chlorhexidine 4% Liquid 1 Application(s) Topical <User Schedule>      ICU Vital Signs Last 24 Hrs  T(C): 36.3 (2020 20:00), Max: 37.1 (2020 15:00)  T(F): 97.4 (2020 20:00), Max: 98.7 (2020 15:00)  HR: 96 (2020 02:00) (80 - 119)  BP: 141/69 (2020 01:00) (112/66 - 161/88)  BP(mean): 86 (2020 01:00) (84 - 108)  ABP: --  ABP(mean): --  RR: 37 (2020 02:00) (11 - 37)  SpO2: 97% (2020 02:00) (94% - 100%)      Adult Advanced Hemodynamics Last 24 Hrs  CVP(mm Hg): --  CVP(cm H2O): --  CO: --  CI: --  PA: --  PA(mean): --  PCWP: --  SVR: --  SVRI: --  PVR: --  PVRI: --          I&O's Summary    2020 07:  -  2020 03:46  --------------------------------------------------------  IN: 2000 mL / OUT: 1010 mL / NET: 990 mL      I&O's Detail    2020 07:  -  2020 03:46  --------------------------------------------------------  IN:    IV PiggyBack: 150 mL    lactated ringers.: 100 mL    lactated ringers.: 1750 mL  Total IN: 2000 mL    OUT:    Indwelling Catheter - Urethral: 1010 mL  Total OUT: 1010 mL    Total NET: 990 mL          PHYSICAL EXAM:    General/Neuro: AAOx3, no focal deficits    Lungs: clear to auscultation, Normal expansion/effort.     Cardiovascular : S1, S2.  sinus tachycardia    GI: Abdomen soft, mildly tender, non-distended. Port sites c/d/i, no erythema    Extremities: Extremities warm, pink, well-perfused. Pulses:Rt     Lt    Derm: Good skin turgor, no skin breakdown.        CXR:       LABS:  CAPILLARY BLOOD GLUCOSE      POCT Blood Glucose.: 152 mg/dL (2020 00:11)  POCT Blood Glucose.: 118 mg/dL (2020 16:19)  POCT Blood Glucose.: 90 mg/dL (2020 06:02)                          11.2   7.59  )-----------( 229      ( 2020 00:44 )             33.1       06-30    140  |  105  |  17  ----------------------------<  149<H>  3.9   |  22  |  0.9    Ca    8.9      2020 00:44  Phos  3.4     06-30  Mg     2.1     06-30 STEVE PIERRE  [id number]  51y/o Female, w/ PMH of obesity (BMI 35), DM, HTN, HLD, QUINTON (w/ oral apparatus), Rojas's esophagus, ex-smoker (quit 2019, 30 pyh) presented to [facility] for elective lap sleeve gastrectomy. Procedure was without acute complications, patient remained hemodynamically stable throughout, and was successfully extubated.    Indication for ICU admission:  Admit Date:20  ICU Date: 20  OR Date: 20    No Known Allergies    PAST MEDICAL & SURGICAL HISTORY:  Barretts syndrome  Anxiety  Smoker: not currentSTOPPED 19  GERD (gastroesophageal reflux disease)  Obese  QUINTON (obstructive sleep apnea): oral device  High cholesterol  DM (diabetes mellitus)  HTN (hypertension)  H/O lumpectomy  S/P hysterectomy:     Home Medications:  amLODIPine 5 mg oral tablet: 1 tab(s) orally once a day (2020 06:19)  atorvastatin 10 mg oral tablet: 1 tab(s) orally once a day (:19)  DULoxetine 60 mg oral delayed release capsule: 1 cap(s) orally 2 times a day (:19)  fenofibrate 145 mg oral tablet: 1 tab(s) orally once a day (:19)  Invokana 300 mg oral tablet: 1 tab(s) orally once a day (:19)  losartan 100 mg oral tablet: 1 tab(s) orally once a day (:19)  metFORMIN 500 mg oral tablet: 1 tab(s) orally once a day (:19)  omeprazole 40 mg oral delayed release capsule: 1 cap(s) orally once a day (:19)  pioglitazone 30 mg oral tablet: 1 tab(s) orally once a day (:19)  Trulicity Pen 1.5 mg/0.5 mL subcutaneous solution: subcutaneous once a week (:19)    Overnight Events: raya d/c at midnight, postoperative tachycardia  likely secondary to pain control, given morphine 2 mg and on IV tylenol, toradol--> HR now<100    NEURO:   Pain - controlled with IV Tylenol, Toradol,   Nausea controlled w/ Zofran    RESP:   On NC @ 3L, sat 95-97%  Hx of QUINTON (has oral apparatus, no CPAP)  Hx of smoking, quit 2019 (30 pyh)  AM CXR    CARDS:  Tachycardia likely 2/2 pain control--> now <100 after pain control  Hx of HTN - holding home Amlodipine, Losartan while NPO  Maintain MAP > 65  Hx of HLD - holding home statin while NPO  EKG: NSR    GI/NUTR:   Diet: NPO, advance diet today if no nausea or vomiting  Hx of Rojas's  GI ppx: PPI    /RENAL:   d/c raya  Monitor electrolytes and replete PRN  Na 140, K 3.9, Mg 2.1, Phos 3.4    HEME/ONC:   f/u pm labs  DVT ppx: HSQ    ID:   Afebrile  f/u pm labs  COVID neg , IGg positive 20      ENDO:  Hx of DM, A1c 5.8 - holding home meds while NPO  on ISS, FS q4 while NPO      LINES/DRAINS: PIV        DVT PTX: HSQ    [X] A ten-point review of systems was otherwise negative except as noted above.  [  ] Due to altered mental status/intubation, subjective information was not attained from the patient. History was obtained, to the extent possible, from review of the chart and collateral sources of information.    Daily Height in cm: 160.02 (2020 07:14)    Daily Weight in k.4 (2020 16:00)    Diet, NPO (20 @ 12:07)      CURRENT MEDS:  Neurologic Medications  acetaminophen  IVPB .. 1000 milliGRAM(s) IV Intermittent once  ketorolac   Injectable 15 milliGRAM(s) IV Push every 8 hours PRN Moderate Pain (4 - 6)  ondansetron Injectable 4 milliGRAM(s) IV Push every 6 hours PRN Nausea    Respiratory Medications    Cardiovascular Medications    Gastrointestinal Medications  dextrose 5%. 1000 milliLiter(s) IV Continuous <Continuous>  lactated ringers. 1000 milliLiter(s) IV Continuous <Continuous>  pantoprazole  Injectable 40 milliGRAM(s) IV Push daily    Genitourinary Medications    Hematologic/Oncologic Medications  heparin   Injectable 5000 Unit(s) SubCutaneous every 8 hours    Antimicrobial/Immunologic Medications  cefoTEtan  IVPB 2 Gram(s) IV Intermittent every 12 hours    Endocrine/Metabolic Medications  dextrose 40% Gel 15 Gram(s) Oral once PRN Blood Glucose LESS THAN 70 milliGRAM(s)/deciliter  dextrose 50% Injectable 12.5 Gram(s) IV Push once  dextrose 50% Injectable 25 Gram(s) IV Push once  dextrose 50% Injectable 25 Gram(s) IV Push once  glucagon  Injectable 1 milliGRAM(s) IntraMuscular once PRN Glucose LESS THAN 70 milligrams/deciliter  insulin regular  human corrective regimen sliding scale   IV Push every 4 hours    Topical/Other Medications  BUpivacaine liposome 1.3% Injectable 20 milliLiter(s) Local Injection once  chlorhexidine 4% Liquid 1 Application(s) Topical <User Schedule>      ICU Vital Signs Last 24 Hrs  T(C): 36.3 (2020 20:00), Max: 37.1 (2020 15:00)  T(F): 97.4 (2020 20:00), Max: 98.7 (2020 15:00)  HR: 96 (2020 02:00) (80 - 119)  BP: 141/69 (2020 01:00) (112/66 - 161/88)  BP(mean): 86 (2020 01:00) (84 - 108)  ABP: --  ABP(mean): --  RR: 37 (2020 02:00) (11 - 37)  SpO2: 97% (2020 02:00) (94% - 100%)      Adult Advanced Hemodynamics Last 24 Hrs  CVP(mm Hg): --  CVP(cm H2O): --  CO: --  CI: --  PA: --  PA(mean): --  PCWP: --  SVR: --  SVRI: --  PVR: --  PVRI: --          I&O's Summary    2020 07:  -  2020 03:46  --------------------------------------------------------  IN: 2000 mL / OUT: 1010 mL / NET: 990 mL      I&O's Detail    2020 07:  -  2020 03:46  --------------------------------------------------------  IN:    IV PiggyBack: 150 mL    lactated ringers.: 100 mL    lactated ringers.: 1750 mL  Total IN: 2000 mL    OUT:    Indwelling Catheter - Urethral: 1010 mL  Total OUT: 1010 mL    Total NET: 990 mL          PHYSICAL EXAM:    General/Neuro: AAOx3, no focal deficits    Lungs: clear to auscultation, Normal expansion/effort.     Cardiovascular : S1, S2.  sinus tachycardia    GI: Abdomen soft, mildly tender, non-distended. Port sites c/d/i, no erythema    Extremities: Extremities warm, pink, well-perfused. Pulses:Rt     Lt    Derm: Good skin turgor, no skin breakdown.        CXR:       LABS:  CAPILLARY BLOOD GLUCOSE      POCT Blood Glucose.: 152 mg/dL (2020 00:11)  POCT Blood Glucose.: 118 mg/dL (2020 16:19)  POCT Blood Glucose.: 90 mg/dL (2020 06:02)                          11.2   7.59  )-----------( 229      ( 2020 00:44 )             33.1       06-30    140  |  105  |  17  ----------------------------<  149<H>  3.9   |  22  |  0.9    Ca    8.9      2020 00:44  Phos  3.4     06-30  Mg     2.1     06-30 STEVE PIERRE  [id number]  49y/o Female, w/ PMH of obesity (BMI 35), DM, HTN, HLD, QUINTON (w/ oral apparatus), Rojas's esophagus, ex-smoker (quit 2019, 30 pyh) presented to [facility] for elective lap sleeve gastrectomy. Procedure was without acute complications, patient remained hemodynamically stable throughout, and was successfully extubated.    Indication for ICU admission:  Admit Date:20  ICU Date: 20  OR Date: 20    No Known Allergies    PAST MEDICAL & SURGICAL HISTORY:  Barretts syndrome  Anxiety  Smoker: not currentSTOPPED 19  GERD (gastroesophageal reflux disease)  Obese  QUINTON (obstructive sleep apnea): oral device  High cholesterol  DM (diabetes mellitus)  HTN (hypertension)  H/O lumpectomy  S/P hysterectomy:     Home Medications:  amLODIPine 5 mg oral tablet: 1 tab(s) orally once a day (2020 06:19)  atorvastatin 10 mg oral tablet: 1 tab(s) orally once a day (:19)  DULoxetine 60 mg oral delayed release capsule: 1 cap(s) orally 2 times a day (:19)  fenofibrate 145 mg oral tablet: 1 tab(s) orally once a day (:19)  Invokana 300 mg oral tablet: 1 tab(s) orally once a day (:19)  losartan 100 mg oral tablet: 1 tab(s) orally once a day (:19)  metFORMIN 500 mg oral tablet: 1 tab(s) orally once a day (:19)  omeprazole 40 mg oral delayed release capsule: 1 cap(s) orally once a day (:19)  pioglitazone 30 mg oral tablet: 1 tab(s) orally once a day (:19)  Trulicity Pen 1.5 mg/0.5 mL subcutaneous solution: subcutaneous once a week (:19)    Overnight Events: raya d/c at midnight, postoperative tachycardia  likely secondary to pain control, given morphine 2 mg and on IV tylenol, toradol--> HR now<100    NEURO:   Pain - controlled with IV Tylenol, Toradol,   Nausea controlled w/ Zofran    RESP:   On RA  Hx of QUINTON (has oral apparatus, no CPAP)  Hx of smoking, quit 2019 (30 pyh)  AM CXR    CARDS:  Tachycardia likely 2/2 pain control--> now <100 after pain control  Hx of HTN - holding home Amlodipine, Losartan while NPO  Maintain MAP > 65  Hx of HLD - holding home statin while NPO  EKG: NSR    GI/NUTR:   Diet: NPO, advance diet today if no nausea or vomiting  Hx of Rojas's  GI ppx: PPI    /RENAL:   d/c raya  Monitor electrolytes and replete PRN  Na 140, K 3.9, Mg 2.1, Phos 3.4    HEME/ONC:   f/u pm labs  DVT ppx: HSQ    ID:   Afebrile  f/u pm labs  COVID neg , IGg positive 20      ENDO:  Hx of DM, A1c 5.8 - holding home meds while NPO  on ISS, FS q4 while NPO      LINES/DRAINS: PIV        DVT PTX: HSQ    [X] A ten-point review of systems was otherwise negative except as noted above.  [  ] Due to altered mental status/intubation, subjective information was not attained from the patient. History was obtained, to the extent possible, from review of the chart and collateral sources of information.    Daily Height in cm: 160.02 (2020 07:14)    Daily Weight in k.4 (2020 16:00)    Diet, NPO (20 @ 12:07)      CURRENT MEDS:  Neurologic Medications  acetaminophen  IVPB .. 1000 milliGRAM(s) IV Intermittent once  ketorolac   Injectable 15 milliGRAM(s) IV Push every 8 hours PRN Moderate Pain (4 - 6)  ondansetron Injectable 4 milliGRAM(s) IV Push every 6 hours PRN Nausea    Respiratory Medications    Cardiovascular Medications    Gastrointestinal Medications  dextrose 5%. 1000 milliLiter(s) IV Continuous <Continuous>  lactated ringers. 1000 milliLiter(s) IV Continuous <Continuous>  pantoprazole  Injectable 40 milliGRAM(s) IV Push daily    Genitourinary Medications    Hematologic/Oncologic Medications  heparin   Injectable 5000 Unit(s) SubCutaneous every 8 hours    Antimicrobial/Immunologic Medications  cefoTEtan  IVPB 2 Gram(s) IV Intermittent every 12 hours    Endocrine/Metabolic Medications  dextrose 40% Gel 15 Gram(s) Oral once PRN Blood Glucose LESS THAN 70 milliGRAM(s)/deciliter  dextrose 50% Injectable 12.5 Gram(s) IV Push once  dextrose 50% Injectable 25 Gram(s) IV Push once  dextrose 50% Injectable 25 Gram(s) IV Push once  glucagon  Injectable 1 milliGRAM(s) IntraMuscular once PRN Glucose LESS THAN 70 milligrams/deciliter  insulin regular  human corrective regimen sliding scale   IV Push every 4 hours    Topical/Other Medications  BUpivacaine liposome 1.3% Injectable 20 milliLiter(s) Local Injection once  chlorhexidine 4% Liquid 1 Application(s) Topical <User Schedule>      ICU Vital Signs Last 24 Hrs  T(C): 36.3 (2020 20:00), Max: 37.1 (2020 15:00)  T(F): 97.4 (2020 20:00), Max: 98.7 (2020 15:00)  HR: 96 (2020 02:00) (80 - 119)  BP: 141/69 (2020 01:00) (112/66 - 161/88)  BP(mean): 86 (2020 01:00) (84 - 108)  ABP: --  ABP(mean): --  RR: 37 (2020 02:00) (11 - 37)  SpO2: 97% (2020 02:00) (94% - 100%)      Adult Advanced Hemodynamics Last 24 Hrs  CVP(mm Hg): --  CVP(cm H2O): --  CO: --  CI: --  PA: --  PA(mean): --  PCWP: --  SVR: --  SVRI: --  PVR: --  PVRI: --          I&O's Summary    2020 07:  -  2020 03:46  --------------------------------------------------------  IN: 2000 mL / OUT: 1010 mL / NET: 990 mL      I&O's Detail    2020 07:  -  2020 03:46  --------------------------------------------------------  IN:    IV PiggyBack: 150 mL    lactated ringers.: 100 mL    lactated ringers.: 1750 mL  Total IN: 2000 mL    OUT:    Indwelling Catheter - Urethral: 1010 mL  Total OUT: 1010 mL    Total NET: 990 mL          PHYSICAL EXAM:    General/Neuro: AAOx3, no focal deficits    Lungs: clear to auscultation, Normal expansion/effort.     Cardiovascular : S1, S2.  sinus tachycardia    GI: Abdomen soft, mildly tender, non-distended. Port sites c/d/i, no erythema    Extremities: Extremities warm, pink, well-perfused. Pulses:Rt     Lt    Derm: Good skin turgor, no skin breakdown.        CXR:       LABS:  CAPILLARY BLOOD GLUCOSE      POCT Blood Glucose.: 152 mg/dL (2020 00:11)  POCT Blood Glucose.: 118 mg/dL (2020 16:19)  POCT Blood Glucose.: 90 mg/dL (2020 06:02)                          11.2   7.59  )-----------( 229      ( 2020 00:44 )             33.1       06-30    140  |  105  |  17  ----------------------------<  149<H>  3.9   |  22  |  0.9    Ca    8.9      2020 00:44  Phos  3.4     06-30  Mg     2.1     06-30 STEVE PIERRE  [id number]  51y/o Female, w/ PMH of obesity (BMI 35), DM, HTN, HLD, QUINTON (w/ oral apparatus), Rojas's esophagus, ex-smoker (quit 2019, 30 pyh) presented to [facility] for elective lap sleeve gastrectomy. Procedure was without acute complications, patient remained hemodynamically stable throughout, and was successfully extubated.    Indication for ICU admission:  Admit Date:20  ICU Date: 20  OR Date: 20    No Known Allergies    PAST MEDICAL & SURGICAL HISTORY:  Barretts syndrome  Anxiety  Smoker: not currentSTOPPED 19  GERD (gastroesophageal reflux disease)  Obese  QUINTON (obstructive sleep apnea): oral device  High cholesterol  DM (diabetes mellitus)  HTN (hypertension)  H/O lumpectomy  S/P hysterectomy:     Home Medications:  amLODIPine 5 mg oral tablet: 1 tab(s) orally once a day (2020 06:19)  atorvastatin 10 mg oral tablet: 1 tab(s) orally once a day (:19)  DULoxetine 60 mg oral delayed release capsule: 1 cap(s) orally 2 times a day (:19)  fenofibrate 145 mg oral tablet: 1 tab(s) orally once a day (:19)  Invokana 300 mg oral tablet: 1 tab(s) orally once a day (:19)  losartan 100 mg oral tablet: 1 tab(s) orally once a day (:19)  metFORMIN 500 mg oral tablet: 1 tab(s) orally once a day (:19)  omeprazole 40 mg oral delayed release capsule: 1 cap(s) orally once a day (:19)  pioglitazone 30 mg oral tablet: 1 tab(s) orally once a day (:19)  Trulicity Pen 1.5 mg/0.5 mL subcutaneous solution: subcutaneous once a week (:19)    Overnight Events: raya d/c at midnight, postoperative tachycardia  likely secondary to pain control, given morphine 2 mg and on IV tylenol, toradol--> HR now<100    NEURO:   Pain - controlled with IV Tylenol, Toradol,   Nausea controlled w/ Zofran    RESP:   On RA  Hx of QUINTON (has oral apparatus, no CPAP)  Hx of smoking, quit 2019 (30 pyh)  AM CXR    CARDS:  Tachycardia likely 2/2 pain control--> now <100 after pain control  Hx of HTN - holding home Amlodipine, Losartan while NPO  Maintain MAP > 65  Hx of HLD - holding home statin while NPO  EKG: NSR    GI/NUTR:   Diet: NPO, advance diet today if no nausea or vomiting  Hx of Rojas's  GI ppx: PPI    /RENAL:   d/c raya  Monitor electrolytes and replete PRN  Na 140, K 3.9, Mg 2.1, Phos 3.4    HEME/ONC:   f/u pm labs  DVT ppx: HSQ    ID:   Afebrile  on post-op cefotetan  COVID neg , IGg positive 20      ENDO:  Hx of DM, A1c 5.8 - holding home meds while NPO  on ISS, FS q4 while NPO      LINES/DRAINS: PIV        DVT PTX: HSQ    [X] A ten-point review of systems was otherwise negative except as noted above.  [  ] Due to altered mental status/intubation, subjective information was not attained from the patient. History was obtained, to the extent possible, from review of the chart and collateral sources of information.    Daily Height in cm: 160.02 (2020 07:14)    Daily Weight in k.4 (2020 16:00)    Diet, NPO (20 @ 12:07)      CURRENT MEDS:  Neurologic Medications  acetaminophen  IVPB .. 1000 milliGRAM(s) IV Intermittent once  ketorolac   Injectable 15 milliGRAM(s) IV Push every 8 hours PRN Moderate Pain (4 - 6)  ondansetron Injectable 4 milliGRAM(s) IV Push every 6 hours PRN Nausea    Respiratory Medications    Cardiovascular Medications    Gastrointestinal Medications  dextrose 5%. 1000 milliLiter(s) IV Continuous <Continuous>  lactated ringers. 1000 milliLiter(s) IV Continuous <Continuous>  pantoprazole  Injectable 40 milliGRAM(s) IV Push daily    Genitourinary Medications    Hematologic/Oncologic Medications  heparin   Injectable 5000 Unit(s) SubCutaneous every 8 hours    Antimicrobial/Immunologic Medications  cefoTEtan  IVPB 2 Gram(s) IV Intermittent every 12 hours    Endocrine/Metabolic Medications  dextrose 40% Gel 15 Gram(s) Oral once PRN Blood Glucose LESS THAN 70 milliGRAM(s)/deciliter  dextrose 50% Injectable 12.5 Gram(s) IV Push once  dextrose 50% Injectable 25 Gram(s) IV Push once  dextrose 50% Injectable 25 Gram(s) IV Push once  glucagon  Injectable 1 milliGRAM(s) IntraMuscular once PRN Glucose LESS THAN 70 milligrams/deciliter  insulin regular  human corrective regimen sliding scale   IV Push every 4 hours    Topical/Other Medications  BUpivacaine liposome 1.3% Injectable 20 milliLiter(s) Local Injection once  chlorhexidine 4% Liquid 1 Application(s) Topical <User Schedule>      ICU Vital Signs Last 24 Hrs  T(C): 36.3 (2020 20:00), Max: 37.1 (2020 15:00)  T(F): 97.4 (2020 20:00), Max: 98.7 (2020 15:00)  HR: 96 (2020 02:00) (80 - 119)  BP: 141/69 (2020 01:00) (112/66 - 161/88)  BP(mean): 86 (2020 01:00) (84 - 108)  ABP: --  ABP(mean): --  RR: 37 (2020 02:00) (11 - 37)  SpO2: 97% (2020 02:00) (94% - 100%)      Adult Advanced Hemodynamics Last 24 Hrs  CVP(mm Hg): --  CVP(cm H2O): --  CO: --  CI: --  PA: --  PA(mean): --  PCWP: --  SVR: --  SVRI: --  PVR: --  PVRI: --          I&O's Summary    2020 07:  -  2020 03:46  --------------------------------------------------------  IN: 2000 mL / OUT: 1010 mL / NET: 990 mL      I&O's Detail    2020 07:  -  2020 03:46  --------------------------------------------------------  IN:    IV PiggyBack: 150 mL    lactated ringers.: 100 mL    lactated ringers.: 1750 mL  Total IN: 2000 mL    OUT:    Indwelling Catheter - Urethral: 1010 mL  Total OUT: 1010 mL    Total NET: 990 mL          PHYSICAL EXAM:    General/Neuro: AAOx3, no focal deficits    Lungs: clear to auscultation, Normal expansion/effort.     Cardiovascular : S1, S2.  sinus tachycardia    GI: Abdomen soft, mildly tender, non-distended. Port sites c/d/i, no erythema    Extremities: Extremities warm, pink, well-perfused. Pulses:Rt     Lt    Derm: Good skin turgor, no skin breakdown.        CXR:       LABS:  CAPILLARY BLOOD GLUCOSE      POCT Blood Glucose.: 152 mg/dL (2020 00:11)  POCT Blood Glucose.: 118 mg/dL (2020 16:19)  POCT Blood Glucose.: 90 mg/dL (2020 06:02)                          11.2   7.59  )-----------( 229      ( 2020 00:44 )             33.1       06-30    140  |  105  |  17  ----------------------------<  149<H>  3.9   |  22  |  0.9    Ca    8.9      2020 00:44  Phos  3.4     06-30  Mg     2.1     06-30 STEVE PIERRE  [id number]  49y/o Female, w/ PMH of obesity (BMI 35), DM, HTN, HLD, QUINTON (w/ oral apparatus), Rojas's esophagus, ex-smoker (quit 2019, 30 pyh) presented to [facility] for elective lap sleeve gastrectomy. Procedure was without acute complications, patient remained hemodynamically stable throughout, and was successfully extubated.    Indication for ICU admission:  Admit Date:20  ICU Date: 20  OR Date: 20    No Known Allergies    PAST MEDICAL & SURGICAL HISTORY:  Barretts syndrome  Anxiety  Smoker: not currentSTOPPED 19  GERD (gastroesophageal reflux disease)  Obese  QUINTON (obstructive sleep apnea): oral device  High cholesterol  DM (diabetes mellitus)  HTN (hypertension)  H/O lumpectomy  S/P hysterectomy:     Home Medications:  amLODIPine 5 mg oral tablet: 1 tab(s) orally once a day (2020 06:19)  atorvastatin 10 mg oral tablet: 1 tab(s) orally once a day (:19)  DULoxetine 60 mg oral delayed release capsule: 1 cap(s) orally 2 times a day (:19)  fenofibrate 145 mg oral tablet: 1 tab(s) orally once a day (:19)  Invokana 300 mg oral tablet: 1 tab(s) orally once a day (:19)  losartan 100 mg oral tablet: 1 tab(s) orally once a day (:19)  metFORMIN 500 mg oral tablet: 1 tab(s) orally once a day (:19)  omeprazole 40 mg oral delayed release capsule: 1 cap(s) orally once a day (:19)  pioglitazone 30 mg oral tablet: 1 tab(s) orally once a day (:19)  Trulicity Pen 1.5 mg/0.5 mL subcutaneous solution: subcutaneous once a week (:19)    Overnight Events: raya d/c at midnight, postoperative tachycardia  likely secondary to pain control, given morphine 2 mg and on IV tylenol, toradol--> HR now<100    NEURO:   Pain - controlled with IV Tylenol, Toradol,   Nausea controlled w/ Zofran    RESP:   On RA  Hx of QUINTON (has oral apparatus, no CPAP)  Hx of smoking, quit 2019 (30 pyh)  AM CXR    CARDS:  Tachycardia likely 2/2 pain control--> now <100 after pain control  Hx of HTN - holding home Amlodipine, Losartan while NPO  Maintain MAP > 65  Hx of HLD - holding home statin while NPO  EKG: NSR    GI/NUTR:   Diet: advance to clear liquids  Hx of Rojas's  GI ppx: PPI    /RENAL:   d/c raya  Monitor electrolytes and replete PRN  Na 140, K 3.9, Mg 2.1, Phos 3.4    HEME/ONC:   f/u pm labs  DVT ppx: HSQ    ID:   Afebrile  on post-op cefotetan  COVID neg , IGg positive 20      ENDO:  Hx of DM, A1c 5.8 - holding home meds while NPO  on ISS, FS q4 while NPO      LINES/DRAINS: PIV        DVT PTX: HSQ    [X] A ten-point review of systems was otherwise negative except as noted above.  [  ] Due to altered mental status/intubation, subjective information was not attained from the patient. History was obtained, to the extent possible, from review of the chart and collateral sources of information.    Daily Height in cm: 160.02 (2020 07:14)    Daily Weight in k.4 (2020 16:00)    Diet, NPO (20 @ 12:07)      CURRENT MEDS:  Neurologic Medications  acetaminophen  IVPB .. 1000 milliGRAM(s) IV Intermittent once  ketorolac   Injectable 15 milliGRAM(s) IV Push every 8 hours PRN Moderate Pain (4 - 6)  ondansetron Injectable 4 milliGRAM(s) IV Push every 6 hours PRN Nausea    Respiratory Medications    Cardiovascular Medications    Gastrointestinal Medications  dextrose 5%. 1000 milliLiter(s) IV Continuous <Continuous>  lactated ringers. 1000 milliLiter(s) IV Continuous <Continuous>  pantoprazole  Injectable 40 milliGRAM(s) IV Push daily    Genitourinary Medications    Hematologic/Oncologic Medications  heparin   Injectable 5000 Unit(s) SubCutaneous every 8 hours    Antimicrobial/Immunologic Medications  cefoTEtan  IVPB 2 Gram(s) IV Intermittent every 12 hours    Endocrine/Metabolic Medications  dextrose 40% Gel 15 Gram(s) Oral once PRN Blood Glucose LESS THAN 70 milliGRAM(s)/deciliter  dextrose 50% Injectable 12.5 Gram(s) IV Push once  dextrose 50% Injectable 25 Gram(s) IV Push once  dextrose 50% Injectable 25 Gram(s) IV Push once  glucagon  Injectable 1 milliGRAM(s) IntraMuscular once PRN Glucose LESS THAN 70 milligrams/deciliter  insulin regular  human corrective regimen sliding scale   IV Push every 4 hours    Topical/Other Medications  BUpivacaine liposome 1.3% Injectable 20 milliLiter(s) Local Injection once  chlorhexidine 4% Liquid 1 Application(s) Topical <User Schedule>      ICU Vital Signs Last 24 Hrs  T(C): 36.3 (2020 20:00), Max: 37.1 (2020 15:00)  T(F): 97.4 (2020 20:00), Max: 98.7 (2020 15:00)  HR: 96 (2020 02:00) (80 - 119)  BP: 141/69 (2020 01:00) (112/66 - 161/88)  BP(mean): 86 (2020 01:00) (84 - 108)  ABP: --  ABP(mean): --  RR: 37 (2020 02:00) (11 - 37)  SpO2: 97% (2020 02:00) (94% - 100%)      Adult Advanced Hemodynamics Last 24 Hrs  CVP(mm Hg): --  CVP(cm H2O): --  CO: --  CI: --  PA: --  PA(mean): --  PCWP: --  SVR: --  SVRI: --  PVR: --  PVRI: --          I&O's Summary    2020 07:  -  2020 03:46  --------------------------------------------------------  IN: 2000 mL / OUT: 1010 mL / NET: 990 mL      I&O's Detail    2020 07:  -  2020 03:46  --------------------------------------------------------  IN:    IV PiggyBack: 150 mL    lactated ringers.: 100 mL    lactated ringers.: 1750 mL  Total IN: 2000 mL    OUT:    Indwelling Catheter - Urethral: 1010 mL  Total OUT: 1010 mL    Total NET: 990 mL          PHYSICAL EXAM:    General/Neuro: AAOx3, no focal deficits    Lungs: clear to auscultation, Normal expansion/effort.     Cardiovascular : S1, S2.  sinus tachycardia    GI: Abdomen soft, mildly tender, non-distended. Port sites c/d/i, no erythema    Extremities: Extremities warm, pink, well-perfused. Pulses:Rt     Lt    Derm: Good skin turgor, no skin breakdown.        CXR:       LABS:  CAPILLARY BLOOD GLUCOSE      POCT Blood Glucose.: 152 mg/dL (2020 00:11)  POCT Blood Glucose.: 118 mg/dL (2020 16:19)  POCT Blood Glucose.: 90 mg/dL (2020 06:02)                          11.2   7.59  )-----------( 229      ( 2020 00:44 )             33.1       06-30    140  |  105  |  17  ----------------------------<  149<H>  3.9   |  22  |  0.9    Ca    8.9      2020 00:44  Phos  3.4     06-30  Mg     2.1     06-30

## 2020-06-30 NOTE — PHARMACOTHERAPY INTERVENTION NOTE - COMMENTS
APAP 1g IV x1, med restricted, pt has received 2 doses  -d/w Richard Singletary, approved 3rd dose
cefotetan 2g IV q12h, indication post-op,   -d/w surgical team to clarify duration, pt received 2 doses, will d/c

## 2020-06-30 NOTE — DISCHARGE NOTE PROVIDER - NSDCCPTREATMENT_GEN_ALL_CORE_FT
PRINCIPAL PROCEDURE  Procedure: Laparoscopic gastric bypass  Findings and Treatment:   You are being discharged from [facility]. Please call to schedule a follow up appointment with Dr. Antunez as previously discussed in 1-2 week.. You may shower, but do not submerge in a water bath. Please avoid heavy weight lifting for the next 4-6 weeks.  If you have any further questions about your care, please do not hesitate to contact Dr. Antunez 's office or return to the Emergency Department.

## 2020-06-30 NOTE — DISCHARGE NOTE NURSING/CASE MANAGEMENT/SOCIAL WORK - PATIENT PORTAL LINK FT
You can access the FollowMyHealth Patient Portal offered by [facility] by registering at the following website: [email or web address]. By joining [employer]’s FollowMyHealth portal, you will also be able to view your health information using other applications (apps) compatible with our system.

## 2020-06-30 NOTE — DISCHARGE NOTE PROVIDER - CARE PROVIDER_API CALL
GEN KENNY  [facility]  [address] [place] [address]  Phone: [phone]  Fax: [phone]  Follow Up Time:

## 2020-06-30 NOTE — PROGRESS NOTE ADULT - SUBJECTIVE AND OBJECTIVE BOX
GENERAL SURGERY PROGRESS NOTE     STEVE PIERRE  50paul  Female  Hospital day: 2  POD: 1  Procedure: Laparoscopic gastric bypass    OVERNIGHT EVENTS: Patient remained persistently tachycardic following case, attributed to poor pain control. She is currently NPO on IVF, no complaints of nausea or vomiting.     T(F): 97.4 (06-29-20 @ 20:00), Max: 98.7 (06-29-20 @ 15:00)  HR: 112 (06-30-20 @ 00:00) (80 - 119)  BP: 149/76 (06-30-20 @ 00:00) (112/66 - 161/88)  RR: 23 (06-30-20 @ 00:00) (11 - 35)  SpO2: 96% (06-30-20 @ 00:00) (94% - 100%)    DIET/FLUIDS: dextrose 5%. 1000 milliLiter(s) IV Continuous <Continuous>  lactated ringers. 1000 milliLiter(s) IV Continuous <Continuous>     GI proph:  pantoprazole  Injectable 40 milliGRAM(s) IV Push daily    AC/ proph: heparin   Injectable 5000 Unit(s) SubCutaneous every 8 hours    ABx: cefoTEtan  IVPB 2 Gram(s) IV Intermittent every 12 hours    PHYSICAL EXAM:  GENERAL: NAD, well-appearing  CHEST/LUNG: Clear to auscultation bilaterally  HEART: Regular rate and rhythm  ABDOMEN: Soft, Nontender, Nondistended;   EXTREMITIES:  No clubbing, cyanosis, or edema    LABS    POCT Blood Glucose.: 152 mg/dL (30 Jun 2020 00:11)  POCT Blood Glucose.: 118 mg/dL (29 Jun 2020 16:19)  POCT Blood Glucose.: 90 mg/dL (29 Jun 2020 06:02)    RADIOLOGY & ADDITIONAL TESTS:  *No new imaging

## 2020-06-30 NOTE — DISCHARGE NOTE PROVIDER - HOSPITAL COURSE
The patient went to operative room for laparoscopic gastric bypass. The patient tolerated the procedure and her vitals are stable during and after the procedure. The patient tolerated diet as it was advanced and her pain is controlled. The patient will be discharged to follow up with Dr. Antunez in her office.             ---    HOSPITAL COURSE:         Patient was medically optimized and improved clinically throughout hospital course. Patient seen and examined on day of discharge.        Vital Signs    T(C): 36.8 (30 Jun 2020 14:43), Max: 36.8 (30 Jun 2020 14:43)    T(F): 98.3 (30 Jun 2020 14:43), Max: 98.3 (30 Jun 2020 14:43)    HR: 78 (30 Jun 2020 14:43) (78 - 119)    BP: 138/77 (30 Jun 2020 14:43) (117/79 - 161/88)    RR: 20 (30 Jun 2020 14:43) (16 - 37)    SpO2: 100% (30 Jun 2020 12:00) (90% - 100%)        Physical Exam:    General: well-developed, well-nourished, NAD    HEENT: normocephalic, atraumatic, EOMI, moist mucous membranes     Neck: supple, non-tender, no masses    Neurology: AAOx3, sensation intact    Respiratory: clear to auscultation bilaterally; no wheezes, rhonchi, or rales    CV: regular rate and rhythm, soft S1/S2, no murmurs, rubs, or gallops    Abdominal: soft, non-tender, non-distended, bowel sounds present    Extremities: no clubbing, cyanosis, or edema; palpable peripheral pulses    Musculoskeletal: no joint erythema or warmth, no joint swelling     Skin: warm, dry, normal color        Patient is medically stable for discharge to home with outpatient follow up.

## 2020-06-30 NOTE — PROGRESS NOTE ADULT - ATTENDING COMMENTS
49yo female with morbid obesity BMI 39, HTN, HLD, DM, QUINTON, GERD, honeycutt's esophagus POD#1 s/p laparoscopic kaia-en-Y gastric bypass. Doing well. Was tachycardic immediately postop to 120's with BP WNL, now BP 80-90's. No nausea/vomiting. Pain controlled. Tolerating bariatric clear liquid diet. Ambulating well. Using incentive spirometer. Downgraded from ICU. Dispo planning.
No acute events overnight.  Looks comfortable this am. Tolerates clears.      ASSESSMENT:  49 y/o female with Morbid obesity.  S/P Lap. Gastric Bypass.  QUINTON.  HTN  Atelectasis.  Acute postoperative pain.    PLAN:  - pain control  - incentive spirometer  - DVT prophylaxis  - advance diet as per bariatric protocol

## 2020-06-30 NOTE — PROGRESS NOTE ADULT - ASSESSMENT
49y/o Female, w/ PMH of obesity (BMI 35), QUINTON (w/ oral apparatus), HTN, DM, and others as listed above,  now POD #1 s/p lap gastric bypass    NEURO:   Pain - controlled with IV Tylenol, Toradol  Nausea controlled w/ Zofran    RESP:   On NC @ 3L, sat 95-97%  Hx of QUINTON (has oral apparatus, no CPAP)  Hx of smoking, quit 8/2019 (30 pyh)  AM CXR    CARDS:   Hx of HTN - holding home Amlodipine, Losartan while NPO  Maintain MAP > 65  Hx of HLD - holding home statin while NPO  EKG: NSR    GI/NUTR:   Diet: NPO  Hx of Rojas's  GI ppx: PPI    /RENAL:   d/c raya  Monitor electrolytes and replete PRN  Na 140, K 3.9, Mg 2.1, Phos 3.4    HEME/ONC:   f/u pm labs  DVT ppx: HSQ    ID:   Afebrile  f/u pm labs  COVID neg 6/26, IGg positive 5/19/20      ENDO:  Hx of DM, A1c 5.8 - holding home meds while NPO  on ISS, FS q4 while NPO      LINES/DRAINS: PIV        DVT PTX: HSQ 49y/o Female, w/ PMH of obesity (BMI 35), QUINTON (w/ oral apparatus), DM, HTN, GERD, HLD, now POD #1 s/p lap gastric bypass    NEURO:   Pain - controlled with IV Tylenol, Toradol  Nausea controlled w/ Zofran    RESP:   On NC @ 3L, sat 95-97%  Hx of QUINTON (has oral apparatus, no CPAP)  Hx of smoking, quit 8/2019 (30 pyh)  AM CXR    CARDS:  Tachycardia likely 2/2 pain control  Hx of HTN - holding home Amlodipine, Losartan while NPO  Maintain MAP > 65  Hx of HLD - holding home statin while NPO  EKG: NSR    GI/NUTR:   Diet: NPO, advance diet today if no nausea or vomiting  Hx of Rojas's  GI ppx: PPI    /RENAL:   Colin d/c on 6/29 midnight  Monitor electrolytes and replete PRN  Na 140, K 3.9, Mg 2.1, Phos 3.4    HEME/ONC:   f/u pm labs  DVT ppx: HSQ    ID:   Afebrile  f/u pm labs  COVID neg 6/26, IGg positive 5/19/20      ENDO:  Hx of DM, A1c 5.8 - holding home meds while NPO  on ISS, FS q4 while NPO      LINES/DRAINS: PIV        DVT PTX: HSQ 51y/o Female, w/ PMH of obesity (BMI 35), QUINTON (w/ oral apparatus), DM, HTN, Tu's esophagus, HLD, now POD #1 s/p lap gastric bypass    NEURO:   Pain - controlled with IV Tylenol, Toradol  Nausea controlled w/ Zofran    RESP:   On NC @ 3L, sat 95-97%  Hx of QUINTON (has oral apparatus, no CPAP)  Hx of smoking, quit 8/2019 (30 pyh)  AM CXR    CARDS:  Tachycardia likely 2/2 pain control  Hx of HTN - holding home Amlodipine, Losartan while NPO  Maintain MAP > 65  Hx of HLD - holding home statin while NPO  EKG: NSR    GI/NUTR:   Diet: NPO, advance diet today if no nausea or vomiting  Hx of Rojas's  GI ppx: PPI    /RENAL:   Colin d/c on 6/29 midnight  Monitor electrolytes and replete PRN  Na 140, K 3.9, Mg 2.1, Phos 3.4    HEME/ONC:   f/u pm labs  DVT ppx: HSQ    ID:   Afebrile  f/u pm labs  COVID neg 6/26, IGg positive 5/19/20      ENDO:  Hx of DM, A1c 5.8 - holding home meds while NPO  on ISS, FS q4 while NPO      LINES/DRAINS: PIV        DVT PTX: HSQ 51y/o Female, w/ PMH of obesity (BMI 35), QUINTON (w/ oral apparatus), DM, HTN, Tu's esophagus, HLD, now POD #1 s/p lap gastric bypass    NEURO:   Pain - controlled with IV Tylenol, Toradol,   Nausea controlled w/ Zofran    RESP:   On NC @ 3L, sat 95-97%  Hx of QUINTON (has oral apparatus, no CPAP)  Hx of smoking, quit 8/2019 (30 pyh)  AM CXR    CARDS:  Tachycardia likely 2/2 pain control  Hx of HTN - holding home Amlodipine, Losartan while NPO  Maintain MAP > 65  Hx of HLD - holding home statin while NPO  EKG: NSR    GI/NUTR:   Diet: NPO, advance diet today if no nausea or vomiting  Hx of Rojas's  GI ppx: PPI    /RENAL:   d/c raya  Monitor electrolytes and replete PRN  Na 140, K 3.9, Mg 2.1, Phos 3.4    HEME/ONC:   f/u pm labs  DVT ppx: HSQ    ID:   Afebrile  f/u pm labs  COVID neg 6/26, IGg positive 5/19/20      ENDO:  Hx of DM, A1c 5.8 - holding home meds while NPO  on ISS, FS q4 while NPO      LINES/DRAINS: PIV        DVT PTX: HSQ 51y/o Female, w/ PMH of obesity (BMI 35), QUINTON (w/ oral apparatus), DM, HTN, Tu's esophagus, HLD, now POD #1 s/p lap gastric bypass    NEURO:   Pain - controlled with IV Tylenol, Toradol,   Nausea controlled w/ Zofran    RESP:   On RA  Hx of QUINTON (has oral apparatus, no CPAP)  Hx of smoking, quit 8/2019 (30 pyh)  AM CXR    CARDS:  Tachycardia likely 2/2 pain control  Hx of HTN - holding home Amlodipine, Losartan while NPO  Maintain MAP > 65  Hx of HLD - holding home statin while NPO  EKG: NSR    GI/NUTR:   Diet: NPO, advance diet today if no nausea or vomiting  Hx of Rojas's  GI ppx: PPI    /RENAL:   d/c raya  Monitor electrolytes and replete PRN  Na 140, K 3.9, Mg 2.1, Phos 3.4    HEME/ONC:   f/u pm labs  DVT ppx: HSQ    ID:   Afebrile  f/u pm labs  COVID neg 6/26, IGg positive 5/19/20      ENDO:  Hx of DM, A1c 5.8 - holding home meds while NPO  on ISS, FS q4 while NPO      LINES/DRAINS: PIV        DVT PTX: HSQ 49y/o Female, w/ PMH of obesity (BMI 35), QUINTON (w/ oral apparatus), DM, HTN, Tu's esophagus, HLD, now POD #1 s/p lap gastric bypass    NEURO:   Pain - controlled with IV Tylenol, Toradol,   Nausea controlled w/ Zofran    RESP:   On RA  Hx of QUINTON (has oral apparatus, no CPAP)  Hx of smoking, quit 8/2019 (30 pyh)  AM CXR    CARDS:  Tachycardia likely 2/2 pain control  Hx of HTN - holding home Amlodipine, Losartan while NPO  Maintain MAP > 65  Hx of HLD - holding home statin while NPO  EKG: NSR    GI/NUTR:   Diet: NPO, advance diet today if no nausea or vomiting  Hx of Rojas's  GI ppx: PPI    /RENAL:   d/c raya  Monitor electrolytes and replete PRN  Na 140, K 3.9, Mg 2.1, Phos 3.4    HEME/ONC:   f/u pm labs  DVT ppx: HSQ    ID:   Afebrile  on post-op cefotetan  COVID neg 6/26, IGg positive 5/19/20      ENDO:  Hx of DM, A1c 5.8 - holding home meds while NPO  on ISS, FS q4 while NPO      LINES/DRAINS: PIV        DVT PTX: HSQ 51y/o Female, w/ PMH of obesity (BMI 35), QUINTON (w/ oral apparatus), DM, HTN, Tu's esophagus, HLD, now POD #1 s/p lap gastric bypass    NEURO:   Pain - controlled with IV Tylenol, Toradol,   Nausea controlled w/ Zofran    RESP:   On RA  Hx of QUINTON (has oral apparatus, no CPAP)  Hx of smoking, quit 8/2019 (30 pyh)  AM CXR    CARDS:  Tachycardia likely 2/2 pain control  Hx of HTN - holding home Amlodipine, Losartan while NPO  Maintain MAP > 65  Hx of HLD - holding home statin while NPO  EKG: NSR    GI/NUTR:   Diet: advance to clear liquids  Hx of Rojas's  GI ppx: PPI    /RENAL:   d/c raya  Monitor electrolytes and replete PRN  Na 140, K 3.9, Mg 2.1, Phos 3.4    HEME/ONC:   f/u pm labs  DVT ppx: HSQ    ID:   Afebrile  on post-op cefotetan  COVID neg 6/26, IGg positive 5/19/20      ENDO:  Hx of DM, A1c 5.8 - holding home meds while NPO  on ISS, FS q4 while NPO      LINES/DRAINS: PIV        DVT PTX: HSQ 51y/o Female, w/ PMH of obesity (BMI 35), QUINTON (w/ oral apparatus), DM, HTN, Tu's esophagus, HLD, now POD #1 s/p lap gastric bypass    NEURO:   Pain - controlled with IV Tylenol, Toradol, received 2 mg morphine overnight    RESP:   On RA  Hx of QUINTON (has oral apparatus, no CPAP)  Hx of smoking, quit 8/2019 (30 pyh)  AM CXR    CARDS:  Tachycardia likely 2/2 pain control, HR resolved <100 after adequate pain control  Hx of HTN - holding home Amlodipine, Losartan while NPO  Maintain MAP > 65  Hx of HLD - holding home statin while NPO  EKG: NSR    GI/NUTR:   Diet: advance to clear liquids  Hx of Rojas's  GI ppx: PPI    /RENAL:   d/c raya  Monitor electrolytes and replete PRN  Na 140, K 3.9, Mg 2.1, Phos 3.4    HEME/ONC:   f/u pm labs  DVT ppx: HSQ    ID:   Afebrile  on post-op cefotetan  COVID neg 6/26, IGg positive 5/19/20      ENDO:  Hx of DM, A1c 5.8 - holding home meds while NPO  on ISS, FS q4 while NPO      LINES/DRAINS: PIV        DVT PTX: HSQ  Dispo: downgrade

## 2020-07-02 DIAGNOSIS — E66.01 MORBID (SEVERE) OBESITY DUE TO EXCESS CALORIES: ICD-10-CM

## 2020-07-02 DIAGNOSIS — Z90.710 ACQUIRED ABSENCE OF BOTH CERVIX AND UTERUS: ICD-10-CM

## 2020-07-02 DIAGNOSIS — E78.5 HYPERLIPIDEMIA, UNSPECIFIED: ICD-10-CM

## 2020-07-02 DIAGNOSIS — Z87.891 PERSONAL HISTORY OF NICOTINE DEPENDENCE: ICD-10-CM

## 2020-07-02 DIAGNOSIS — Z79.84 LONG TERM (CURRENT) USE OF ORAL HYPOGLYCEMIC DRUGS: ICD-10-CM

## 2020-07-02 DIAGNOSIS — G47.33 OBSTRUCTIVE SLEEP APNEA (ADULT) (PEDIATRIC): ICD-10-CM

## 2020-07-02 DIAGNOSIS — I10 ESSENTIAL (PRIMARY) HYPERTENSION: ICD-10-CM

## 2020-07-02 DIAGNOSIS — E11.9 TYPE 2 DIABETES MELLITUS WITHOUT COMPLICATIONS: ICD-10-CM

## 2020-07-02 DIAGNOSIS — K21.9 GASTRO-ESOPHAGEAL REFLUX DISEASE WITHOUT ESOPHAGITIS: ICD-10-CM

## 2020-07-02 LAB — SURGICAL PATHOLOGY STUDY: SIGNIFICANT CHANGE UP

## 2020-07-08 ENCOUNTER — APPOINTMENT (OUTPATIENT)
Dept: SURGERY | Facility: CLINIC | Age: 50
End: 2020-07-08
Payer: COMMERCIAL

## 2020-07-08 VITALS — HEIGHT: 63 IN | WEIGHT: 195 LBS | BODY MASS INDEX: 34.55 KG/M2

## 2020-07-08 PROCEDURE — 99024 POSTOP FOLLOW-UP VISIT: CPT

## 2020-07-09 NOTE — HISTORY OF PRESENT ILLNESS
[Date of Surgery: ___] : Date of Surgery:   [unfilled] [___ Days Post Op] : [unfilled] days  [Walking] : walking [Phase 2] : Phase 2

## 2020-07-09 NOTE — PHYSICAL EXAM
[de-identified] : RRR [de-identified] : CTA B/L [de-identified] : soft, non-tender [de-identified] : no erythema, no induration, no swelling

## 2020-07-09 NOTE — ASSESSMENT
[___ Days Post Op] : [unfilled] days [de-identified] : 49yo female with PMHx of DM, HTN, HLD, QUINTON, honeycutt's esophagus and morbid obesity BMI 38 to 34 s/p laparoscopic kaia-en-Y gastric bypass 6/29/2020. Doing well.\par -Tolerating bariatric puree diet\par -2 shakes per day and approximately 60oz water daily\par -walking for exercise\par -taking vitamins\par -continue PPI\par -return to office post-operatively for 1 month follow up\par -call with concerns

## 2020-07-12 NOTE — CHART NOTE - NSCHARTNOTEFT_GEN_A_CORE
PACU ANESTHESIA ADMISSION NOTE      ____ Intubated  TV:______       Rate: ______      FiO2: ______    __x__ Patent Airway    __x__ Full return of protective reflexes    ____ Full recovery from anesthesia / sedation to baseline status    Vitals:  HR 90  /59  RR 16  O2sat. 99%  Temp: 36.5C      Mental Status:  __x__ Awake   __x___ Alert   _____ Drowsy   _____ Sedated    Nausea/Vomiting: ____ Yes, See Post - Op Orders      _x___ No    Pain Scale (0-10): _3____    Treatment: ____ None    _x___ See Post - Op/PCA Orders    Post - Operative Fluids:   ____ Oral   __x__ See Post - Op Orders    Plan:  Discharge to:   ____Home       _____Floor      ____x_Critical Care    _____ Other:_________________    Comments: s/p general anesthesia with ETT. No anesthesia complications. Pt's condition is stable in PACU. Full report is given to PACU RN. No

## 2020-07-21 NOTE — ED ADULT NURSE NOTE - FINAL NURSING ELECTRONIC SIGNATURE
reviewed today:    No results found for this visit on 07/21/20. See prenatal vital sign section and fetal assessment section    ASSESSMENT & Plan    Diagnosis Orders   1. Yeast infection  fluconazole (DIFLUCAN) 150 MG tablet    terconazole (TERAZOL 3) 0.8 % vaginal cream    C.trachomatis N.gonorrhoeae DNA    VAGINITIS DNA PROBE   2. Encounter for supervision of other normal pregnancy in second trimester               I am having Shannon Laws start on fluconazole and terconazole. I am also having her maintain her acetaminophen, Prenatal MV-Min-Fe Fum-FA-DHA (PRENATAL 1 PO), and ferrous sulfate. No follow-ups on file. There are no Patient Instructions on file for this visit.           Sarah Keyes,7/21/2020 4:34 PM 25-Feb-2018 11:15

## 2020-08-05 ENCOUNTER — APPOINTMENT (OUTPATIENT)
Dept: SURGERY | Facility: CLINIC | Age: 50
End: 2020-08-05
Payer: COMMERCIAL

## 2020-08-05 VITALS — TEMPERATURE: 98.2 F | BODY MASS INDEX: 33.66 KG/M2 | WEIGHT: 190 LBS | HEIGHT: 63 IN

## 2020-08-05 PROCEDURE — 99024 POSTOP FOLLOW-UP VISIT: CPT

## 2020-08-05 NOTE — ED ADULT TRIAGE NOTE - NS ED NURSE BANDS TYPE
Patient : Joyce Smallwood Age: 46 year old Sex: female   MRN: [id number] Encounter Date: 8/5/2020      History     Chief Complaint   Patient presents with   • Fall   • Hip Pain     L- Hip     HPI   G15/G15  8/5/2020  9:13 AM Joyce Smallwood is a 46 year old female who presents to the ED for evaluation of left hip pain that began following a fall that occurred yesterday around 2 or 3 pm. She was walking up a hill outside on grass, and when she transition to concrete, her leg \"gave out\" causing her to fall to the ground. She did not hit her head on the ground and did not have any LOC. The fall produced left hip pain and left forearm pain. She also developed an abrasion to the left forearm in the fall. Despite having pain in the left hip, she has been able to walk, but slowly. She has taken aleve for pain last night, but has not taken anything this morning. Pt denies neck pain, or any other associated sx. She believes her tetanus is up to day, but is not sure when her last shot was. There are no further complaints or modifying factors at this time.    PCP: Terry Lowe MD      Allergies   Allergen Reactions   • Codeine HIVES and SWELLING   • Acetaminophen Other (See Comments)     Patient does not remember the reaction   • Tramadol Other (See Comments)     Sleep       Current Discharge Medication List      Prior to Admission Medications    Details   oxyCODONE HCl (OXECTA) 5 MG tablet Take 5 mg by mouth 4 times daily as needed (pain).  Qty: 10 each, Refills: 0      atorvastatin (LIPITOR) 40 MG tablet Take 1 tablet by mouth daily.  Qty: 90 tablet, Refills: 3      pantoprazole (PROTONIX) 40 MG tablet Take 1 tablet by mouth daily.  Qty: 60 tablet, Refills: 1      amLODIPine (NORVASC) 10 MG tablet Take 1 tablet by mouth daily.  Qty: 30 tablet, Refills: 11      SUMAtriptan (IMITREX) 50 MG tablet Take 1 tablet by mouth at onset of migraine. May repeat after 2 hours if needed.  Qty: 30 tablet, Refills: 1      tiZANidine  (ZANAFLEX) 2 MG tablet Take 1 tablet by mouth every 6 hours as needed for Muscle spasms.  Qty: 30 tablet, Refills: 0      clindamycin (CLEOCIN T) 1 % topical solution Refills: 6      clotrimazole (LOTRIMIN) 1 % cream Apply topically 2 times daily.  Qty: 30 g, Refills: 0      hydroCORTisone (CORTIZONE) 2.5 % cream Apply 1 application topically 3 times daily. UNTIL REDNESS DISAPPEARS  Qty: 30 g, Refills: 0      albuterol 108 (90 Base) MCG/ACT inhaler Inhale 2 puffs into the lungs every 4 hours as needed for Wheezing.  Qty: 8.5 g, Refills: 0      fluticasone-salmeterol (ADVAIR DISKUS) 500-50 MCG/DOSE AEROSOL POWDER, BREATH ACTIVATED Inhale 1 puff into the lungs two times daily.  Qty: 60 each, Refills: 11             Past Medical History:   Diagnosis Date   • Asthma    • BV (bacterial vaginosis)    • Chronic LBP    • Depression    • Dyspareunia, female 4/29/2014   • Essential hypertension 6/30/2020   • Grieving 3/15/2019   • Hyperlipidemia    • Influenza A 3/29/2019   • Menometrorrhagia    • Migraine headache    • Narcolepsy    • Obesity    • Rotator cuff tendinitis     Right   • Sleep apnea    • STD (sexually transmitted disease)        Past Surgical History:   Procedure Laterality Date   • ESOPHAGOGASTRODUODENOSCOPY TRANSORAL FLEX W/BX SINGLE OR MULT  06/29/2020    [place]   • HYSTERECTOMY     • TUBAL LIGATION         Family History   Problem Relation Age of Onset   • Asthma Brother    • Allergic Rhinitis Daughter    • Allergic Rhinitis Son    • Allergic Rhinitis Son    • NEGATIVE FAMILY HX OF Other    • Diabetes Maternal Aunt    • Stroke Maternal Aunt    • Hypertension Maternal Grandmother    • Migraine Maternal Grandmother    • Ophthalmology Maternal Grandmother         Glaucoma, blindness       Social History     Tobacco Use   • Smoking status: Never Smoker   • Smokeless tobacco: Never Used   Substance Use Topics   • Alcohol use: No     Alcohol/week: 0.0 standard drinks   • Drug use: No       Review of Systems    Constitutional: Negative for appetite change, chills and fever.   HENT: Negative for congestion and rhinorrhea.    Eyes: Negative for visual disturbance.   Respiratory: Negative for cough, chest tightness and shortness of breath.    Cardiovascular: Negative for chest pain and leg swelling.   Gastrointestinal: Negative for abdominal pain, constipation, diarrhea, nausea and vomiting.   Genitourinary: Negative for dysuria and urgency.   Musculoskeletal: Negative for arthralgias, myalgias and neck pain.        Positive for left hip pain and left forearm pain.    Skin: Positive for wound (abrasion left forearm). Negative for rash.   Neurological: Negative for dizziness and headaches.   Psychiatric/Behavioral: Negative for confusion. The patient is not nervous/anxious.        Physical Exam     ED Triage Vitals   ED Triage Vitals Group      Temp 08/05/20 0851 98.8 °F (37.1 °C)      Heart Rate 08/05/20 0851 75      Resp 08/05/20 0851 18      BP 08/05/20 0851 (S) (!) 192/93      SpO2 08/05/20 0851 100 %      EtCO2 mmHg --       Height 08/05/20 0913 5' 2\" (1.575 m)      Weight 08/05/20 0913 195 lb 12.3 oz (88.8 kg)      Weight Scale Used 08/05/20 0913 ED Actual      BMI (Calculated) 08/05/20 0913 35.81      IBW/kg (Calculated) 08/05/20 0913 50.1       Physical Exam   Constitutional: She is oriented to person, place, and time. She appears well-developed and well-nourished. She is cooperative.  Non-toxic appearance. She does not have a sickly appearance. No distress.   HENT:   Head: Normocephalic and atraumatic.   Nose: No rhinorrhea.   Mouth/Throat: Oropharynx is clear and moist and mucous membranes are normal.   Eyes: Conjunctivae, EOM and lids are normal. Right eye exhibits no discharge. Left eye exhibits no discharge. No scleral icterus.   Neck: No spinous process tenderness present.   Cardiovascular: Normal rate, regular rhythm, S1 normal, S2 normal and normal heart sounds.   No murmur heard.  Pulmonary/Chest: Effort  Name band; normal and breath sounds normal. No accessory muscle usage or stridor. No tachypnea and no bradypnea. No respiratory distress. She exhibits no tenderness.   Abdominal: Soft. Bowel sounds are normal. She exhibits no distension and no ascites. There is no abdominal tenderness. There is no rigidity, no rebound, no guarding and no CVA tenderness.   Musculoskeletal: Normal range of motion.         General: No edema.      Left hip: She exhibits tenderness.      Comments: No midline C spine tenderness.    Neurological: She is alert and oriented to person, place, and time. She has normal strength. No cranial nerve deficit or sensory deficit. GCS eye subscore is 4. GCS verbal subscore is 5. GCS motor subscore is 6.   Skin: Skin is warm, dry and intact. No rash noted. She is not diaphoretic. No cyanosis or erythema. No pallor.   Abrasion to the left forearm with mild edema   Psychiatric: She has a normal mood and affect. Her speech is normal and behavior is normal. Thought content normal.   Nursing note and vitals reviewed.      ED Course     Procedures    Lab Results     No results found for this visit on 08/05/20.        Radiology Results     Imaging Results          XR Forearm 2 View Left (Final result)  Result time 08/05/20 10:28:07    Final result                 Impression:    IMPRESSION:    1.  No acute osseous findings.  2.  Mild focal soft tissue edema along the dorsal aspect of the proximal  left forearm without radiopaque foreign body.    I have personally reviewed the images and modified the resident's report as  necessary.             Narrative:    XR FOREARM 2 VW LEFT 8/5/2020 10:15 AM    HISTORY: Left forearm pain following fall.    COMPARISON: None available.    TECHNIQUE:  2 views of the left forearm.    FINDINGS:    No fracture or dislocation. No joint effusion. Ill-defined calcifications  project along the lateral epicondyle of the humerus, possibly enthesopathy.  Mild focal soft tissue edema is present  along the dorsal aspect of the  proximal left forearm. No radiopaque foreign body.                    Preliminary result                 Impression:      1.  No acute osseous findings.  2.  Mild focal soft tissue edema along the dorsal aspect of the proximal  left forearm without radiopaque foreign body.             Narrative:    XR FOREARM 2 VW LEFT 8/5/2020 10:15 AM    HISTORY: Left forearm pain following fall.    COMPARISON: None available.    TECHNIQUE:  2 views of the left forearm.    FINDINGS:    No fracture or dislocation. No joint effusion. Ill-defined calcifications  project along the lateral epicondyle of the humerus, likely representing  calcific tendinitis. Mild focal soft tissue edema is present along the  dorsal aspect of the proximal left forearm. No radiopaque foreign body.                                 XR Hip Left AP and Lateral (Final result)  Result time 08/05/20 10:30:27    Final result                 Impression:    IMPRESSION:    No acute osseous findings.    I have personally reviewed the images and modified the resident's report as  necessary.             Narrative:    XR HIP 2 VW LEFT 8/5/2020 10:15 AM    HISTORY: Left hip pain following fall.    COMPARISON: None available.    TECHNIQUE:  2 views of the left hip.     FINDINGS:     No fracture or dislocation. Minimal subchondral sclerosis and osteophytosis  of the left hip. Tiny osteophytes are present at the pubic symphysis.  Scattered pelvic phleboliths. Visualized soft tissues are within normal  limits.                    Preliminary result                 Impression:      No acute osseous findings.             Narrative:    XR HIP 2 VW LEFT 8/5/2020 10:15 AM    HISTORY: Left hip pain following fall.    COMPARISON: None available.    TECHNIQUE:  2 views of the left hip.     FINDINGS:     No fracture or dislocation. Minimal subchondral sclerosis and   osteophytosis  of the left hip. Tiny osteophytes are present at the pubic  symphysis.  Scattered pelvic phleboliths. Visualized soft tissues are within normal  limits.                                  ED Medication Orders (From admission, onward)    None               [facility]    Vitals  Vitals:    08/05/20 0913 08/05/20 0930 08/05/20 1036 08/05/20 1103   BP: (!) 153/83 (!) 162/88 (!) 176/102 (!) 159/85   Pulse: 68 69 67 75   Resp: 18 18 18 18   Temp:    98.7 °F (37.1 °C)   TempSrc:    Oral   SpO2: 100% 99% 100% 100%   Weight: 88.8 kg      Height: 5' 2\" (1.575 m)          ED Course  9:19 AM I reviewed the patient's medications, allergies, and past medical and surgical history in Epic. Her tetanus booster is up to date.     10:55 AM I updated the patient on her x-ray results and plan of care.  All questions answered.          [facility]  Critical Care time spent on this patient outside of billable procedures:  None      Diagnosis:   1. Fall, initial encounter    2. Contusion of left hip, initial encounter    3. Contusion of left forearm, initial encounter    4. Abrasion of left forearm, initial encounter        Medications:  Discharge Medication List as of 8/5/2020 10:52 AM      START taking these medications    Details   oxyCODONE, IMM REL, (ROXICODONE) 5 MG immediate release tablet Take 1 tablet by mouth every 6 hours as needed for Pain.Eprescribe, Disp-12 tablet, R-0                Follow-Up:   Terry Lowe MD  [address] [place] [address] [phone]    Call   As needed, If symptoms worsen        Pt is discharged.   I have reviewed the information recorded by the scribe for accuracy and agree with its contents.      ____________________________________________________________________    Trell Cardenas acting as a scribe for Dr. Gayle Bliss  Dictation # [id number]  Scribe: Trell Bliss, DO  08/05/20 1020

## 2020-08-05 NOTE — HISTORY OF PRESENT ILLNESS
[Date of Surgery: ___] : Date of Surgery:   [unfilled] [___ Months Post Op] : [unfilled] months [Phase 4] : Phase 4 [Walking] : walking [de-identified] : Overall, feels well with good energy levels. Sleeping well.  [FreeTextEntry1] : grilled chicken, soups, 2 protein shakes/day

## 2020-08-05 NOTE — ASSESSMENT
[___ Months Post Op] : [unfilled] months [Today's BMI: ___] : Today's BMI: [unfilled] [de-identified] : 49yo female with PMHx of DM, HTN, HLD, QUINTON, honeycutt's esophagus and morbid obesity BMI 38 to 33 s/p laparoscopic kaia-en-Y gastric bypass 6/29/2020. Doing well.\par -Tolerating bariatric phase 4 diet\par -2 shakes per day and approximately 60oz water daily\par -walking for exercise - cleared for more strenuous exercise\par -taking vitamins daily\par -continue PPI daily \par -Rx Actigal\par -no longer uses CPAP\par -labs for 3 month visit ordered\par -return to office post-operatively for 1 month follow up\par -call with concerns

## 2020-08-05 NOTE — PHYSICAL EXAM
[de-identified] : CTA B/L [de-identified] : soft, non-tender [de-identified] : RRR [de-identified] : no erythema, no induration, no swelling

## 2020-09-30 ENCOUNTER — APPOINTMENT (OUTPATIENT)
Dept: SURGERY | Facility: CLINIC | Age: 50
End: 2020-09-30
Payer: COMMERCIAL

## 2020-09-30 VITALS — BODY MASS INDEX: 32.46 KG/M2 | WEIGHT: 183.2 LBS | TEMPERATURE: 97.5 F | HEIGHT: 63 IN

## 2020-09-30 PROCEDURE — 99242 OFF/OP CONSLTJ NEW/EST SF 20: CPT

## 2020-09-30 RX ORDER — GABAPENTIN 250 MG/5ML
250 SOLUTION ORAL EVERY 8 HOURS
Qty: 15 | Refills: 0 | Status: COMPLETED | COMMUNITY
Start: 2020-06-23 | End: 2020-09-30

## 2020-09-30 RX ORDER — ENOXAPARIN SODIUM 100 MG/ML
40 INJECTION SUBCUTANEOUS
Qty: 28 | Refills: 0 | Status: COMPLETED | COMMUNITY
Start: 2020-06-24 | End: 2020-09-30

## 2020-09-30 RX ORDER — IBUPROFEN 200 MG
600 CAPSULE ORAL DAILY
Refills: 0 | Status: COMPLETED | COMMUNITY
Start: 2019-08-27 | End: 2020-09-30

## 2020-09-30 NOTE — HISTORY OF PRESENT ILLNESS
[de-identified] : 49yo female with PMHx of DM, HLD, HTN, GERD, Anxiety, obesity with BMI 38.7 s/p laparoscopic gastric bypass 6/29/2020. At this time, patient states she is feeling well with good energy levels. She is sleeping well, no longer snores. Does not use CPAP. Drinking plenty of water, increased amount recently but was not good about water intake before. 1 protein shake per day. Small portions of grilled chicken, vegetables, little bits of pasta. Admits to snacking on carbs, cookies, and chocolates more than before. No reflux or heart burn.Taking PPI daily. Taking MV BID, B12. Taking actigal BID. Exercising regularly - [place] 25 minute sessions. Reports fingerstick glucose levels have been good. [Procedure: ___] : Procedure performed: [unfilled]  [Date of Surgery: ___] : Date of Surgery:   [unfilled] [Surgeon Name:   ___] : Surgeon Name: Dr. MEJIA [___ Months Post Op] : [unfilled] months

## 2020-09-30 NOTE — ASSESSMENT
[FreeTextEntry1] : 49yo female with hypertension, QUINTON, DM, HLD, GERD, honeycutt's esophagus and morbid obesity BMI 38 s/p laparoscopic gastric bypass 6/29/2020.\par -Tolerating bariatric diet - avoid carbs (pasta and cookies)\par -continue drinking plenty of water\par -continue protein intake - goal 70g per day, drinking 1-2 protein shakes per day and high protein foods\par -continue PPI - taper to every other day\par -continue daily vitamins\par -labs ordered - F/U calcium level\par -continue actigal\par -continue regular exercise - encouraged resistance training in addition to cardio\par -current medications reviewed\par -return to office for 6 month follow up\par -call with concerns

## 2020-09-30 NOTE — PHYSICAL EXAM
[Obese] : obese [Normal] : affect appropriate [de-identified] : soft, obese, well-healed pfannenstiel incision, no rebound/guarding, no hernias, no masses [de-identified] : no CVA tenderness

## 2020-10-30 ENCOUNTER — EMERGENCY (EMERGENCY)
Facility: HOSPITAL | Age: 50
LOS: 0 days | Discharge: HOME | End: 2020-10-31
Attending: EMERGENCY MEDICINE | Admitting: EMERGENCY MEDICINE
Payer: COMMERCIAL

## 2020-10-30 VITALS
HEART RATE: 110 BPM | OXYGEN SATURATION: 99 % | SYSTOLIC BLOOD PRESSURE: 141 MMHG | DIASTOLIC BLOOD PRESSURE: 90 MMHG | RESPIRATION RATE: 18 BRPM | TEMPERATURE: 99 F | HEIGHT: 63 IN
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
## 2020-10-30 DIAGNOSIS — Z90.710 ACQUIRED ABSENCE OF BOTH CERVIX AND UTERUS: Chronic | ICD-10-CM
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
## 2020-10-30 DIAGNOSIS — S42.001A FRACTURE OF UNSPECIFIED PART OF RIGHT CLAVICLE, INITIAL ENCOUNTER FOR CLOSED FRACTURE: ICD-10-CM

## 2020-10-30 DIAGNOSIS — Z98.890 OTHER SPECIFIED POSTPROCEDURAL STATES: Chronic | ICD-10-CM
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
## 2020-10-30 PROCEDURE — 99284 EMERGENCY DEPT VISIT MOD MDM: CPT | Mod: 57

## 2020-10-30 PROCEDURE — 23500 CLTX CLAVICULAR FX W/O MNPJ: CPT | Mod: 54

## 2020-10-31 PROCEDURE — 73060 X-RAY EXAM OF HUMERUS: CPT | Mod: 26,RT

## 2020-10-31 PROCEDURE — 73030 X-RAY EXAM OF SHOULDER: CPT | Mod: 26,RT

## 2020-10-31 NOTE — ED PROVIDER NOTE - OBJECTIVE STATEMENT
51 y/o F p/w s/p fall mechanical slip after slipping out of hot tub onto right front shoulder, no loc, no a/c, no neck or back pain. no other injuries.

## 2020-10-31 NOTE — ED PROVIDER NOTE - CLINICAL SUMMARY MEDICAL DECISION MAKING FREE TEXT BOX
CXR confirms distal 1/3 clavicular fracture.  Sling and f/u with Ortho.  NSAIDs/Tylenol for pain. Strict return instructions discussed.

## 2020-10-31 NOTE — ED PROVIDER NOTE - NS ED ROS FT
Constitutional: See HPI.  Eyes: No visual changes, eye pain or discharge. No Photophobia  ENMT: No neck pain or stiffness. No limited ROM  Cardiac: No chest pain   Respiratory: No cough or respiratory distress.   GI: No nausea, vomiting, diarrhea or abdominal pain.  MS: see hpi   Neuro: No headache or weakness. No LOC.  Skin: No skin rash.  Except as documented in the HPI, all other systems are negative.

## 2020-10-31 NOTE — ED PROVIDER NOTE - CARE PROVIDER_API CALL
Bud Judd  ORTHOPAEDIC SURGERY  [address] [place] [address]  Phone: [phone]  Fax: [phone]  Follow Up Time: 4-6 Days

## 2020-10-31 NOTE — ED PROVIDER NOTE - PATIENT PORTAL LINK FT
You can access the FollowMyHealth Patient Portal offered by [facility] by registering at the following website: [email or web address]. By joining [email or web address]’s FollowMyHealth portal, you will also be able to view your health information using other applications (apps) compatible with our system.

## 2020-10-31 NOTE — ED PROVIDER NOTE - ATTENDING CONTRIBUTION TO CARE
49 y/o female with no relevant PMHx presents to ED with right anterior shoulder pain s/p mechanical trip and fall while getting out of a hot tub.  No other injuries.  No LOC.  No blood thinners.  PE:  agree with above.  A/P:  Shoulder Pain, r/o fracture.

## 2020-10-31 NOTE — ED PROVIDER NOTE - PHYSICAL EXAMINATION
VITAL SIGNS: I have reviewed nursing notes and confirm.  CONSTITUTIONAL: well-appearing, non-toxic  SKIN: Warm dry, normal skin turgor  HEAD: NCAT  EYES: EOMI, PERRLA, no scleral icterus  ENT: Moist mucous membranes, normal pharynx   NECK: Supple; non tender. Full ROM.   CARD: RRR, no murmurs, rubs or gallops  RESP: clear to ausculation b/l.  No rales, rhonchi, or wheezing.  ABD: soft, + BS, non-tender, non-distended  EXT: right A/C tenderness, limited ROM of right shoulder 2/2 pain, neurovascularly intact, no skin tenting   NEURO: normal motor. normal sensory. Normal gait.  PSYCH: Cooperative, appropriate.

## 2020-11-11 ENCOUNTER — APPOINTMENT (OUTPATIENT)
Dept: OBGYN | Facility: CLINIC | Age: 50
End: 2020-11-11

## 2020-11-26 ENCOUNTER — INPATIENT (INPATIENT)
Facility: HOSPITAL | Age: 50
LOS: 1 days | Discharge: HOME | End: 2020-11-28
Attending: INTERNAL MEDICINE | Admitting: INTERNAL MEDICINE
Payer: COMMERCIAL

## 2020-11-26 VITALS
OXYGEN SATURATION: 100 % | SYSTOLIC BLOOD PRESSURE: 126 MMHG | HEART RATE: 120 BPM | RESPIRATION RATE: 22 BRPM | TEMPERATURE: 101 F | DIASTOLIC BLOOD PRESSURE: 59 MMHG | WEIGHT: 169.98 LBS | HEIGHT: 63 IN

## 2020-11-26 DIAGNOSIS — Z98.890 OTHER SPECIFIED POSTPROCEDURAL STATES: Chronic | ICD-10-CM

## 2020-11-26 DIAGNOSIS — Z90.710 ACQUIRED ABSENCE OF BOTH CERVIX AND UTERUS: Chronic | ICD-10-CM

## 2020-11-26 LAB
ALBUMIN SERPL ELPH-MCNC: 4 G/DL — SIGNIFICANT CHANGE UP (ref 3.5–5.2)
ALP SERPL-CCNC: 45 U/L — SIGNIFICANT CHANGE UP (ref 30–115)
ALT FLD-CCNC: 19 U/L — SIGNIFICANT CHANGE UP (ref 0–41)
ANION GAP SERPL CALC-SCNC: 15 MMOL/L — HIGH (ref 7–14)
APPEARANCE UR: CLEAR — SIGNIFICANT CHANGE UP
AST SERPL-CCNC: 22 U/L — SIGNIFICANT CHANGE UP (ref 0–41)
BACTERIA # UR AUTO: ABNORMAL
BASOPHILS # BLD AUTO: 0.01 K/UL — SIGNIFICANT CHANGE UP (ref 0–0.2)
BASOPHILS NFR BLD AUTO: 0.1 % — SIGNIFICANT CHANGE UP (ref 0–1)
BILIRUB SERPL-MCNC: 0.5 MG/DL — SIGNIFICANT CHANGE UP (ref 0.2–1.2)
BILIRUB UR-MCNC: ABNORMAL
BUN SERPL-MCNC: 15 MG/DL — SIGNIFICANT CHANGE UP (ref 10–20)
CALCIUM SERPL-MCNC: 8.8 MG/DL — SIGNIFICANT CHANGE UP (ref 8.5–10.1)
CHLORIDE SERPL-SCNC: 108 MMOL/L — SIGNIFICANT CHANGE UP (ref 98–110)
CO2 SERPL-SCNC: 17 MMOL/L — SIGNIFICANT CHANGE UP (ref 17–32)
COLOR SPEC: YELLOW — SIGNIFICANT CHANGE UP
CREAT SERPL-MCNC: 0.7 MG/DL — SIGNIFICANT CHANGE UP (ref 0.7–1.5)
DIFF PNL FLD: NEGATIVE — SIGNIFICANT CHANGE UP
EOSINOPHIL # BLD AUTO: 0 K/UL — SIGNIFICANT CHANGE UP (ref 0–0.7)
EOSINOPHIL NFR BLD AUTO: 0 % — SIGNIFICANT CHANGE UP (ref 0–8)
EPI CELLS # UR: 7 /HPF — HIGH (ref 0–5)
GLUCOSE SERPL-MCNC: 123 MG/DL — HIGH (ref 70–99)
GLUCOSE UR QL: ABNORMAL
HCG SERPL QL: NEGATIVE — SIGNIFICANT CHANGE UP
HCT VFR BLD CALC: 37.3 % — SIGNIFICANT CHANGE UP (ref 37–47)
HGB BLD-MCNC: 12.2 G/DL — SIGNIFICANT CHANGE UP (ref 12–16)
HYALINE CASTS # UR AUTO: 8 /LPF — HIGH (ref 0–7)
IMM GRANULOCYTES NFR BLD AUTO: 0.4 % — HIGH (ref 0.1–0.3)
KETONES UR-MCNC: ABNORMAL
LACTATE SERPL-SCNC: 1.3 MMOL/L — SIGNIFICANT CHANGE UP (ref 0.7–2)
LEUKOCYTE ESTERASE UR-ACNC: ABNORMAL
LIDOCAIN IGE QN: 19 U/L — SIGNIFICANT CHANGE UP (ref 7–60)
LYMPHOCYTES # BLD AUTO: 0.55 K/UL — LOW (ref 1.2–3.4)
LYMPHOCYTES # BLD AUTO: 7.5 % — LOW (ref 20.5–51.1)
MCHC RBC-ENTMCNC: 29.8 PG — SIGNIFICANT CHANGE UP (ref 27–31)
MCHC RBC-ENTMCNC: 32.7 G/DL — SIGNIFICANT CHANGE UP (ref 32–37)
MCV RBC AUTO: 91.2 FL — SIGNIFICANT CHANGE UP (ref 81–99)
MONOCYTES # BLD AUTO: 0.3 K/UL — SIGNIFICANT CHANGE UP (ref 0.1–0.6)
MONOCYTES NFR BLD AUTO: 4.1 % — SIGNIFICANT CHANGE UP (ref 1.7–9.3)
NEUTROPHILS # BLD AUTO: 6.44 K/UL — SIGNIFICANT CHANGE UP (ref 1.4–6.5)
NEUTROPHILS NFR BLD AUTO: 87.9 % — HIGH (ref 42.2–75.2)
NITRITE UR-MCNC: NEGATIVE — SIGNIFICANT CHANGE UP
NRBC # BLD: 0 /100 WBCS — SIGNIFICANT CHANGE UP (ref 0–0)
PH UR: 6 — SIGNIFICANT CHANGE UP (ref 5–8)
PLATELET # BLD AUTO: 205 K/UL — SIGNIFICANT CHANGE UP (ref 130–400)
POTASSIUM SERPL-MCNC: 3.8 MMOL/L — SIGNIFICANT CHANGE UP (ref 3.5–5)
POTASSIUM SERPL-SCNC: 3.8 MMOL/L — SIGNIFICANT CHANGE UP (ref 3.5–5)
PROT SERPL-MCNC: 6 G/DL — SIGNIFICANT CHANGE UP (ref 6–8)
PROT UR-MCNC: ABNORMAL
RAPID RVP RESULT: SIGNIFICANT CHANGE UP
RBC # BLD: 4.09 M/UL — LOW (ref 4.2–5.4)
RBC # FLD: 13.4 % — SIGNIFICANT CHANGE UP (ref 11.5–14.5)
RBC CASTS # UR COMP ASSIST: 8 /HPF — HIGH (ref 0–4)
SARS-COV-2 RNA SPEC QL NAA+PROBE: SIGNIFICANT CHANGE UP
SODIUM SERPL-SCNC: 140 MMOL/L — SIGNIFICANT CHANGE UP (ref 135–146)
SP GR SPEC: 1.05 — HIGH (ref 1.01–1.03)
UROBILINOGEN FLD QL: ABNORMAL
WBC # BLD: 7.33 K/UL — SIGNIFICANT CHANGE UP (ref 4.8–10.8)
WBC # FLD AUTO: 7.33 K/UL — SIGNIFICANT CHANGE UP (ref 4.8–10.8)
WBC UR QL: 26 /HPF — HIGH (ref 0–5)

## 2020-11-26 PROCEDURE — 71045 X-RAY EXAM CHEST 1 VIEW: CPT | Mod: 26

## 2020-11-26 PROCEDURE — 74177 CT ABD & PELVIS W/CONTRAST: CPT | Mod: 26

## 2020-11-26 PROCEDURE — 99283 EMERGENCY DEPT VISIT LOW MDM: CPT

## 2020-11-26 PROCEDURE — 93010 ELECTROCARDIOGRAM REPORT: CPT

## 2020-11-26 PROCEDURE — 99285 EMERGENCY DEPT VISIT HI MDM: CPT

## 2020-11-26 RX ORDER — SODIUM CHLORIDE 9 MG/ML
1000 INJECTION INTRAMUSCULAR; INTRAVENOUS; SUBCUTANEOUS ONCE
Refills: 0 | Status: COMPLETED | OUTPATIENT
Start: 2020-11-26 | End: 2020-11-26

## 2020-11-26 RX ORDER — ACETAMINOPHEN 500 MG
975 TABLET ORAL ONCE
Refills: 0 | Status: COMPLETED | OUTPATIENT
Start: 2020-11-26 | End: 2020-11-26

## 2020-11-26 RX ORDER — CEFTRIAXONE 500 MG/1
1000 INJECTION, POWDER, FOR SOLUTION INTRAMUSCULAR; INTRAVENOUS ONCE
Refills: 0 | Status: COMPLETED | OUTPATIENT
Start: 2020-11-26 | End: 2020-11-26

## 2020-11-26 RX ORDER — METRONIDAZOLE 500 MG
500 TABLET ORAL ONCE
Refills: 0 | Status: COMPLETED | OUTPATIENT
Start: 2020-11-26 | End: 2020-11-26

## 2020-11-26 RX ORDER — CIPROFLOXACIN LACTATE 400MG/40ML
400 VIAL (ML) INTRAVENOUS ONCE
Refills: 0 | Status: COMPLETED | OUTPATIENT
Start: 2020-11-26 | End: 2020-11-26

## 2020-11-26 RX ORDER — IOHEXOL 300 MG/ML
30 INJECTION, SOLUTION INTRAVENOUS ONCE
Refills: 0 | Status: COMPLETED | OUTPATIENT
Start: 2020-11-26 | End: 2020-11-26

## 2020-11-26 RX ORDER — KETOROLAC TROMETHAMINE 30 MG/ML
15 SYRINGE (ML) INJECTION ONCE
Refills: 0 | Status: DISCONTINUED | OUTPATIENT
Start: 2020-11-26 | End: 2020-11-26

## 2020-11-26 RX ADMIN — CEFTRIAXONE 100 MILLIGRAM(S): 500 INJECTION, POWDER, FOR SOLUTION INTRAMUSCULAR; INTRAVENOUS at 18:52

## 2020-11-26 RX ADMIN — SODIUM CHLORIDE 1000 MILLILITER(S): 9 INJECTION INTRAMUSCULAR; INTRAVENOUS; SUBCUTANEOUS at 17:21

## 2020-11-26 RX ADMIN — Medication 15 MILLIGRAM(S): at 17:20

## 2020-11-26 RX ADMIN — Medication 975 MILLIGRAM(S): at 22:00

## 2020-11-26 RX ADMIN — SODIUM CHLORIDE 1000 MILLILITER(S): 9 INJECTION INTRAMUSCULAR; INTRAVENOUS; SUBCUTANEOUS at 18:53

## 2020-11-26 RX ADMIN — Medication 200 MILLIGRAM(S): at 21:39

## 2020-11-26 RX ADMIN — Medication 100 MILLIGRAM(S): at 21:00

## 2020-11-26 RX ADMIN — IOHEXOL 30 MILLILITER(S): 300 INJECTION, SOLUTION INTRAVENOUS at 17:21

## 2020-11-26 NOTE — CONSULT NOTE ADULT - SUBJECTIVE AND OBJECTIVE BOX
STEVE PIERRE [id number]  50y Female PMH below notable for RnY Gastric bypass  presents to ED for evaluation of fever, sore throat, earpain, myalgia, diarrhea and generalized malaise over the past 48h, in the ED patient had CT showing diffuse colitis and surgery consult placed for eval and reccomendations. COVID -ve, never had colonoscopy      PAST MEDICAL & SURGICAL HISTORY:  Barretts syndrome    Anxiety    Smoker  not currentSTOPPED 19    GERD (gastroesophageal reflux disease)    Obese    QUINTON (obstructive sleep apnea)  oral device    High cholesterol    DM (diabetes mellitus)    HTN (hypertension)    H/O lumpectomy    S/P hysterectomy            MEDICATIONS  (STANDING):  acetaminophen   Tablet .. 975 milliGRAM(s) Oral Once    MEDICATIONS  (PRN):      Allergies    No Known Allergies    Intolerances        REVIEW OF SYSTEMS    [X] A ten-point review of systems was otherwise negative except as noted.  [ ] Due to altered mental status/intubation, subjective information were not able to be obtained from the patient. History was obtained, to the extent possible, from review of the chart and collateral sources of information.      Vital Signs Last 24 Hrs  T(C): 39.7 (2020 21:35), Max: 39.7 (2020 21:35)  T(F): 103.5 (2020 21:35), Max: 103.5 (2020 21:35)  HR: 113 (2020 21:35) (113 - 120)  BP: 134/84 (2020 21:35) (126/59 - 134/84)  BP(mean): --  RR: 20 (2020 21:35) (20 - 22)  SpO2: 100% (2020 21:35) (100% - 100%)    PHYSICAL EXAM:  GENERAL: Appears uncomfortable,  HEART: Tachy  ABDOMEN: Soft, Nondistended, mild right sided tenderness;   EXTREMITIES:  No clubbing, cyanosis, or edema      LABS:  Labs:  CAPILLARY BLOOD GLUCOSE                              12.2   7.33  )-----------( 205      ( 2020 17:31 )             37.3       Auto Neutrophil %: 87.9 % (20 @ 17:31)  Auto Immature Granulocyte %: 0.4 % (20 @ 17:31)        140  |  108  |  15  ----------------------------<  123<H>  3.8   |  17  |  0.7      Calcium, Total Serum: 8.8 mg/dL (20 @ 17:31)      LFTs:             6.0  | 0.5  | 22       ------------------[45      ( 2020 17:31 )  4.0  | x    | 19          Lipase:19     Amylase:x         Lactate, Blood: 1.3 mmol/L (20 @ 17:31)      Coags:    Urinalysis Basic - ( 2020 17:43 )    Color: Yellow / Appearance: Clear / S.050 / pH: x  Gluc: x / Ketone: Small  / Bili: Small / Urobili: 3 mg/dL   Blood: x / Protein: 30 mg/dL / Nitrite: Negative   Leuk Esterase: Moderate / RBC: 8 /HPF / WBC 26 /HPF   Sq Epi: x / Non Sq Epi: 7 /HPF / Bacteria: Moderate    RADIOLOGY & ADDITIONAL STUDIES:  < from: CT Abdomen and Pelvis w/ Oral Cont and w/ IV Cont (20 @ 20:03) >  IMPRESSION:    Long segment bowel wall thickening and inflammatory stranding involving the cecum, ascending colon and proximal transverse colon consistent with colitis which may be infectious or inflammatory in etiology. No evidence of obstruction.    Status post gastric bypass.    < end of copied text >

## 2020-11-26 NOTE — ED PROVIDER NOTE - OBJECTIVE STATEMENT
50y F pmh barretts esophagus, gerd, gastric sleeve, live, dm, htn presents for eval of fever. Pt has 2 days of fever with associated sore throat, b/l ear discomfort, rhinorrhea, myalgia and nbnb diarrhea, minimal relief with tylenol, aggravated with ambulation with sense of lightheaded/dizziness. pt last took tylenol 1hr pta.

## 2020-11-26 NOTE — ED PROVIDER NOTE - NS ED ROS FT
Constitutional: (+) fever, (+)myalgia  Eyes/ENT: (+) rhinorrhea, (+) ear pain, (+) sore throat  Cardiovascular: (-) chest pain, (-) syncope  Respiratory: (-) cough, (-) shortness of breath  Gastrointestinal: (+) diarrhea, (-) vomiting  : (-) dysuria, (-) hematuria  Musculoskeletal: (-) neck pain, (-) back pain, (-) joint pain  Integumentary: (-) rash, (-) edema  Neurological: (-) headache, (-) altered mental status  Allergic/Immunologic: (-) pruritus

## 2020-11-26 NOTE — ED PROVIDER NOTE - PMH
Anxiety    Barretts syndrome    DM (diabetes mellitus)    GERD (gastroesophageal reflux disease)    High cholesterol    HTN (hypertension)    Obese    QUINTON (obstructive sleep apnea)  oral device  Smoker  not currentSTOPPED 8/30/19

## 2020-11-26 NOTE — CONSULT NOTE ADULT - ASSESSMENT
50f presents with generalized malaise for 2 days, febrile to 103.5 in ED, no leukocytosis, ct findings with colitis    Plan:  No acute surgical intervention  Continue supportive care, IV hydration, strict is and os  serial abdominal exams    d/w Dr. Antunez 50f presents with generalized malaise for 2 days, febrile to 103.5 in ED, no leukocytosis, ct findings with colitis    Plan:  No acute surgical intervention  Continue supportive care, IV hydration, strict is and os, Abx  serial abdominal exams    d/w Dr. Antunez 50f presents with generalized malaise for 2 days, febrile to 103.5 in ED, no leukocytosis, ct findings with colitis    Plan:  No acute surgical intervention  Continue supportive care, IV hydration (banana bag), strict is and os, Abx  serial abdominal exams    d/w Dr. Antunez

## 2020-11-26 NOTE — ED PROVIDER NOTE - CLINICAL SUMMARY MEDICAL DECISION MAKING FREE TEXT BOX
pt evaluated for dizziness and fever, found to have abdominal tenderness on exam, CT A/P remarkable for short segment of colitis. pt treated with IVF and IV abx and admitted for further workup and treatment.

## 2020-11-26 NOTE — CONSULT NOTE ADULT - ATTENDING COMMENTS
49yo female with PMHx of morbid obesity s/p laparoscopic gastric bypass 6/2020 presenting with generalized fatigue, muscle aches, diarrhea and fevers to 103.5F. Exam benign. Labs reviewed - WBC 7, negative for COVID. CT A/P demonstrates colitis with large stool burden. No acute surgical intervention as per Bariatric surgery, recommend daily Banana bag in resuscitation. Primary care as per primary team.

## 2020-11-26 NOTE — ED PROVIDER NOTE - ATTENDING CONTRIBUTION TO CARE
51 yo female with PMH DM, Rojas's, GERD, HTN, hx gastric bypass, presents c/o 2 day history of rhinorrhea with associated sore throat and myalgias. + watery nonbloody stools. Denies any cough, SOB or CP. Today developed lower abdominal pain. No urinary complaints. + fever in triage 101F. + dizziness on and off at home.    VITAL SIGNS: noted  CONSTITUTIONAL: Well-developed; well-nourished; in no acute distress  HEAD: Normocephalic; atraumatic  EYES: PERRL, EOM intact; conjunctiva and sclera clear  ENT: No nasal discharge; airway clear. MMM  NECK: Supple; non tender. No anterior cervical lymphadenopathy noted  CARD: S1, S2 normal; no murmurs, gallops, or rubs. Tachycardic.  RESP: CTAB/L, no wheezes, rales or rhonchi  ABD: Normal bowel sounds; soft; non-distended; +RLQ tenderness, no rebound or guarding, no CVA tenderness  EXT: Normal ROM. No calf tenderness or edema. Distal pulses intact  NEURO: Alert, oriented. Grossly unremarkable. No focal deficits  SKIN: Skin exam is warm and dry

## 2020-11-26 NOTE — ED PROVIDER NOTE - CARE PLAN
Principal Discharge DX:	Colitis  Secondary Diagnosis:	Sepsis  Secondary Diagnosis:	UTI (urinary tract infection)

## 2020-11-26 NOTE — ED PROVIDER NOTE - PHYSICAL EXAMINATION
CONST: Pt appears uncomfortable  EYES: PERRL, EOMI, Sclera and conjunctiva clear.   ENT: Clear nasal discharge. Oropharynx mild erythema. No abscess or swelling. Uvula midline. TM nonbulging b/l  NECK: Non-tender, no meningeal signs. normal ROM. supple   CARD: Tachycardiac S1 S2; No jvd  RESP: Equal BS B/L, No wheezes, rhonchi or rales. No distress  GI: RLQ tenderness. no cva tenderness. normal BS  MS: Normal ROM in all extremities. pulses 2 +. no calf tenderness or swelling  SKIN: Warm, dry, no acute rashes. Good turgor  NEURO: A&Ox3, No focal deficits. Strength 5/5 with no sensory deficits. Finger to nose intact. Negative pronator drift

## 2020-11-27 LAB
FLU A RESULT: NEGATIVE — SIGNIFICANT CHANGE UP
FLU A RESULT: NEGATIVE — SIGNIFICANT CHANGE UP
FLUAV AG NPH QL: NEGATIVE — SIGNIFICANT CHANGE UP
FLUBV AG NPH QL: NEGATIVE — SIGNIFICANT CHANGE UP
GLUCOSE BLDC GLUCOMTR-MCNC: 106 MG/DL — HIGH (ref 70–99)
GLUCOSE BLDC GLUCOMTR-MCNC: 90 MG/DL — SIGNIFICANT CHANGE UP (ref 70–99)
GLUCOSE BLDC GLUCOMTR-MCNC: 93 MG/DL — SIGNIFICANT CHANGE UP (ref 70–99)
GLUCOSE BLDC GLUCOMTR-MCNC: 99 MG/DL — SIGNIFICANT CHANGE UP (ref 70–99)
RSV RESULT: NEGATIVE — SIGNIFICANT CHANGE UP
RSV RNA RESP QL NAA+PROBE: NEGATIVE — SIGNIFICANT CHANGE UP

## 2020-11-27 PROCEDURE — 99223 1ST HOSP IP/OBS HIGH 75: CPT

## 2020-11-27 PROCEDURE — 99232 SBSQ HOSP IP/OBS MODERATE 35: CPT

## 2020-11-27 RX ORDER — ALPRAZOLAM 0.25 MG
0.5 TABLET ORAL
Qty: 0 | Refills: 0 | DISCHARGE

## 2020-11-27 RX ORDER — METFORMIN HYDROCHLORIDE 850 MG/1
1 TABLET ORAL
Qty: 0 | Refills: 0 | DISCHARGE

## 2020-11-27 RX ORDER — METRONIDAZOLE 500 MG
500 TABLET ORAL EVERY 8 HOURS
Refills: 0 | Status: DISCONTINUED | OUTPATIENT
Start: 2020-11-27 | End: 2020-11-27

## 2020-11-27 RX ORDER — LOSARTAN POTASSIUM 100 MG/1
1 TABLET, FILM COATED ORAL
Qty: 0 | Refills: 0 | DISCHARGE

## 2020-11-27 RX ORDER — INSULIN GLARGINE 100 [IU]/ML
20 INJECTION, SOLUTION SUBCUTANEOUS AT BEDTIME
Refills: 0 | Status: DISCONTINUED | OUTPATIENT
Start: 2020-11-27 | End: 2020-11-28

## 2020-11-27 RX ORDER — DULOXETINE HYDROCHLORIDE 30 MG/1
1 CAPSULE, DELAYED RELEASE ORAL
Qty: 0 | Refills: 0 | DISCHARGE

## 2020-11-27 RX ORDER — ACETAMINOPHEN 500 MG
650 TABLET ORAL EVERY 6 HOURS
Refills: 0 | Status: DISCONTINUED | OUTPATIENT
Start: 2020-11-27 | End: 2020-11-28

## 2020-11-27 RX ORDER — ATORVASTATIN CALCIUM 80 MG/1
20 TABLET, FILM COATED ORAL AT BEDTIME
Refills: 0 | Status: DISCONTINUED | OUTPATIENT
Start: 2020-11-27 | End: 2020-11-28

## 2020-11-27 RX ORDER — ATORVASTATIN CALCIUM 80 MG/1
1 TABLET, FILM COATED ORAL
Qty: 0 | Refills: 0 | DISCHARGE

## 2020-11-27 RX ORDER — SODIUM CHLORIDE 9 MG/ML
1000 INJECTION, SOLUTION INTRAVENOUS
Refills: 0 | Status: DISCONTINUED | OUTPATIENT
Start: 2020-11-28 | End: 2020-11-28

## 2020-11-27 RX ORDER — PANTOPRAZOLE SODIUM 20 MG/1
40 TABLET, DELAYED RELEASE ORAL
Refills: 0 | Status: DISCONTINUED | OUTPATIENT
Start: 2020-11-27 | End: 2020-11-28

## 2020-11-27 RX ORDER — URSODIOL 250 MG/1
300 TABLET, FILM COATED ORAL
Qty: 0 | Refills: 0 | DISCHARGE

## 2020-11-27 RX ORDER — CANAGLIFLOZIN 100 MG/1
1 TABLET, FILM COATED ORAL
Qty: 0 | Refills: 0 | DISCHARGE

## 2020-11-27 RX ORDER — SODIUM CHLORIDE 9 MG/ML
1000 INJECTION, SOLUTION INTRAVENOUS
Refills: 0 | Status: DISCONTINUED | OUTPATIENT
Start: 2020-11-27 | End: 2020-11-27

## 2020-11-27 RX ORDER — ALPRAZOLAM 0.25 MG
0.5 TABLET ORAL
Refills: 0 | Status: DISCONTINUED | OUTPATIENT
Start: 2020-11-27 | End: 2020-11-28

## 2020-11-27 RX ORDER — DULAGLUTIDE 4.5 MG/.5ML
0 INJECTION, SOLUTION SUBCUTANEOUS
Qty: 0 | Refills: 0 | DISCHARGE

## 2020-11-27 RX ORDER — INSULIN LISPRO 100/ML
5 VIAL (ML) SUBCUTANEOUS
Refills: 0 | Status: DISCONTINUED | OUTPATIENT
Start: 2020-11-27 | End: 2020-11-28

## 2020-11-27 RX ORDER — CIPROFLOXACIN LACTATE 400MG/40ML
400 VIAL (ML) INTRAVENOUS EVERY 12 HOURS
Refills: 0 | Status: DISCONTINUED | OUTPATIENT
Start: 2020-11-27 | End: 2020-11-27

## 2020-11-27 RX ORDER — GLUCAGON INJECTION, SOLUTION 0.5 MG/.1ML
1 INJECTION, SOLUTION SUBCUTANEOUS ONCE
Refills: 0 | Status: DISCONTINUED | OUTPATIENT
Start: 2020-11-27 | End: 2020-11-28

## 2020-11-27 RX ORDER — SODIUM CHLORIDE 9 MG/ML
1000 INJECTION, SOLUTION INTRAVENOUS
Refills: 0 | Status: DISCONTINUED | OUTPATIENT
Start: 2020-11-27 | End: 2020-11-28

## 2020-11-27 RX ORDER — INSULIN LISPRO 100/ML
VIAL (ML) SUBCUTANEOUS
Refills: 0 | Status: DISCONTINUED | OUTPATIENT
Start: 2020-11-27 | End: 2020-11-28

## 2020-11-27 RX ORDER — DEXTROSE 50 % IN WATER 50 %
12.5 SYRINGE (ML) INTRAVENOUS ONCE
Refills: 0 | Status: DISCONTINUED | OUTPATIENT
Start: 2020-11-27 | End: 2020-11-28

## 2020-11-27 RX ORDER — FENOFIBRATE,MICRONIZED 130 MG
1 CAPSULE ORAL
Qty: 0 | Refills: 0 | DISCHARGE

## 2020-11-27 RX ORDER — DEXTROSE 50 % IN WATER 50 %
25 SYRINGE (ML) INTRAVENOUS ONCE
Refills: 0 | Status: DISCONTINUED | OUTPATIENT
Start: 2020-11-27 | End: 2020-11-28

## 2020-11-27 RX ORDER — ENOXAPARIN SODIUM 100 MG/ML
40 INJECTION SUBCUTANEOUS
Refills: 0 | Status: DISCONTINUED | OUTPATIENT
Start: 2020-11-27 | End: 2020-11-27

## 2020-11-27 RX ORDER — PIOGLITAZONE HYDROCHLORIDE 15 MG/1
1 TABLET ORAL
Qty: 0 | Refills: 0 | DISCHARGE

## 2020-11-27 RX ORDER — AMLODIPINE BESYLATE 2.5 MG/1
1 TABLET ORAL
Qty: 0 | Refills: 0 | DISCHARGE

## 2020-11-27 RX ORDER — SODIUM CHLORIDE 9 MG/ML
1000 INJECTION, SOLUTION INTRAVENOUS
Refills: 0 | Status: COMPLETED | OUTPATIENT
Start: 2020-11-27 | End: 2020-11-27

## 2020-11-27 RX ORDER — OMEPRAZOLE 10 MG/1
1 CAPSULE, DELAYED RELEASE ORAL
Qty: 0 | Refills: 0 | DISCHARGE

## 2020-11-27 RX ORDER — ENOXAPARIN SODIUM 100 MG/ML
40 INJECTION SUBCUTANEOUS DAILY
Refills: 0 | Status: DISCONTINUED | OUTPATIENT
Start: 2020-11-27 | End: 2020-11-28

## 2020-11-27 RX ORDER — DEXTROSE 50 % IN WATER 50 %
15 SYRINGE (ML) INTRAVENOUS ONCE
Refills: 0 | Status: DISCONTINUED | OUTPATIENT
Start: 2020-11-27 | End: 2020-11-28

## 2020-11-27 RX ORDER — URSODIOL 250 MG/1
300 TABLET, FILM COATED ORAL
Refills: 0 | Status: DISCONTINUED | OUTPATIENT
Start: 2020-11-27 | End: 2020-11-28

## 2020-11-27 RX ORDER — LOSARTAN POTASSIUM 100 MG/1
100 TABLET, FILM COATED ORAL DAILY
Refills: 0 | Status: DISCONTINUED | OUTPATIENT
Start: 2020-11-27 | End: 2020-11-28

## 2020-11-27 RX ORDER — INFLUENZA VIRUS VACCINE 15; 15; 15; 15 UG/.5ML; UG/.5ML; UG/.5ML; UG/.5ML
0.5 SUSPENSION INTRAMUSCULAR ONCE
Refills: 0 | Status: DISCONTINUED | OUTPATIENT
Start: 2020-11-27 | End: 2020-11-28

## 2020-11-27 RX ADMIN — PANTOPRAZOLE SODIUM 40 MILLIGRAM(S): 20 TABLET, DELAYED RELEASE ORAL at 05:41

## 2020-11-27 RX ADMIN — LOSARTAN POTASSIUM 100 MILLIGRAM(S): 100 TABLET, FILM COATED ORAL at 05:41

## 2020-11-27 RX ADMIN — ATORVASTATIN CALCIUM 20 MILLIGRAM(S): 80 TABLET, FILM COATED ORAL at 21:17

## 2020-11-27 RX ADMIN — Medication 650 MILLIGRAM(S): at 02:43

## 2020-11-27 RX ADMIN — Medication 200 MILLIGRAM(S): at 05:40

## 2020-11-27 RX ADMIN — SODIUM CHLORIDE 150 MILLILITER(S): 9 INJECTION, SOLUTION INTRAVENOUS at 08:58

## 2020-11-27 RX ADMIN — URSODIOL 300 MILLIGRAM(S): 250 TABLET, FILM COATED ORAL at 18:54

## 2020-11-27 RX ADMIN — URSODIOL 300 MILLIGRAM(S): 250 TABLET, FILM COATED ORAL at 05:41

## 2020-11-27 RX ADMIN — Medication 100 MILLIGRAM(S): at 05:39

## 2020-11-27 NOTE — CONSULT NOTE ADULT - ASSESSMENT
51 y/o Female PMHx of HTN, DM2, GERD, Rojas's, High cholesterol, Obesity, QUINTON, Anxiety, RnY Gastric bypass 6/20 presents to ED for fever, myalgia, dizziness, sore throat, ear pain, dizziness, tension headache, diarrhea and generalized malaise over the past 48h. Patient also notes recent fracture of right clavicle after fall. Denies cough, SOB, n/v, chest pain, anosmia, however notes abd pain and chills which ave since subsided. Denies any sick contacts or Hx or recent travel. While in ED, TMax 103.5, , remainder of VS WNL. 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  CXR / CT with no PNA  No COVID-19   SIRS. NO sepsis    RECOMMENDATIONS;  See no need for ABx or need for CT with contrast ( colitis part of the constitutional manifestations of a viral infection )  Repeat a Infuenza swab ( not a viral panel ). If positive po Tamiflu 75 mg q12h for 5 days

## 2020-11-27 NOTE — H&P ADULT - NSHPLABSRESULTS_GEN_ALL_CORE
12.2   7.33  )-----------( 205      ( 26 Nov 2020 17:31 )             37.3       11-26    140  |  108  |  15  ----------------------------<  123<H>  3.8   |  17  |  0.7    Ca    8.8      26 Nov 2020 17:31    TPro  6.0  /  Alb  4.0  /  TBili  0.5  /  DBili  x   /  AST  22  /  ALT  19  /  AlkPhos  45  11-26 12.2   7.33  )-----------( 205      ( 26 Nov 2020 17:31 )             37.3       11-26    140  |  108  |  15  ----------------------------<  123<H>  3.8   |  17  |  0.7    Ca    8.8      26 Nov 2020 17:31    TPro  6.0  /  Alb  4.0  /  TBili  0.5  /  DBili  x   /  AST  22  /  ALT  19  /  AlkPhos  45  11-26    ct a/p:     Long segment bowel wall thickening and inflammatory stranding involving the cecum, ascending colon and proximal transverse colon consistent with colitis which may be infectious or inflammatory in etiology. No evidence of obstruction.    Status post gastric bypass.    ekg: nsr, irbb (int by me)

## 2020-11-27 NOTE — PATIENT PROFILE ADULT - SURGICAL SITE INCISION
no
soft/medium
Implemented All Fall Risk Interventions:  [place] to call system. Call bell, personal items and telephone within reach. Instruct patient to call for assistance. Room bathroom lighting operational. Non-slip footwear when patient is off stretcher. Physically safe environment: no spills, clutter or unnecessary equipment. Stretcher in lowest position, wheels locked, appropriate side rails in place. Provide visual cue, wrist band, yellow gown, etc. Monitor gait and stability. Monitor for mental status changes and reorient to person, place, and time. Review medications for side effects contributing to fall risk. Reinforce activity limits and safety measures with patient and family.

## 2020-11-27 NOTE — H&P ADULT - ATTENDING COMMENTS
seen this morning    case discussed in detail with resident    ID consult reviewed-no Abx    supportive care recheck flu swab-if positive Tamiflu    discharge depends on symptomatic improvement and PO tolerance

## 2020-11-27 NOTE — H&P ADULT - HISTORY OF PRESENT ILLNESS
49 y/o Female PMHx of HTN, DM2, GERD, Rojas's, High cholesterol, Obesity, QUINTON, Anxiety, below notable for RnY Gastric bypass 6/20 presents to ED for evaluation of fever, sore throat, ear pain, myalgia, diarrhea and generalized malaise over the past 48h, in the ED patient had CT showing diffuse colitis and surgery consult placed for eval and recommendations COVID -ve, never had colonoscopy 51 y/o Female PMHx of HTN, DM2, GERD, Rojas's, High cholesterol, Obesity, QUINTON, Anxiety, RnY Gastric bypass 6/20 presents to ED for fever, myalgia, sore throat, ear pain, diarrhea and generalized malaise over the past 48h. Denies cough, SOB, n/v, chest pain, anosmia. While in ED, TMax 103.5, , remainder of VS WNL. Patient began to desaturate on RA to high 80's/low 90's and was started on 2L NC, however was taken off NC shortly after, currently tolerating RA. COVID swab was performed and found to be negative. CTAP performed showing diffuse colitis. Surgery consult placed for eval and recommendation. 49 y/o Female PMHx of HTN, DM2, GERD, Rojas's, High cholesterol, Obesity, QUINTON, Anxiety, RnY Gastric bypass 6/20 presents to ED for fever, myalgia, dizziness, sore throat, ear pain, tension headache, diarrhea and generalized malaise over the past 48h. Denies cough, SOB, n/v, chest pain, anosmia, however notes abd pain and chills which ave since subsided. While in ED, TMax 103.5, , remainder of VS WNL. Patient began to desaturate on RA to high 80's/low 90's and was started on 2L NC, however was taken off NC shortly after, currently tolerating RA. COVID swab was performed and found to be negative. CTAP performed showing diffuse colitis. Surgery consult placed for eval and recommendation. 51 y/o Female PMHx of HTN, DM2, GERD, Rojas's, High cholesterol, Obesity, QUINTON, Anxiety, RnY Gastric bypass 6/20 presents to ED for fever, myalgia, dizziness, sore throat, ear pain, dizziness, tension headache, diarrhea and generalized malaise over the past 48h. Denies cough, SOB, n/v, chest pain, anosmia, however notes abd pain and chills which ave since subsided. While in ED, TMax 103.5, , remainder of VS WNL. Patient began to desaturate on RA to high 80's/low 90's and was started on 2L NC, however was taken off NC shortly after, currently tolerating RA. COVID swab was performed and found to be negative. CTAP performed showing diffuse colitis. Surgery consult placed for eval and recommendation. 51 y/o Female PMHx of HTN, DM2, GERD, Rojas's, High cholesterol, Obesity, QUINTON, Anxiety, RnY Gastric bypass 6/20 presents to ED for fever, myalgia, dizziness, sore throat, ear pain, dizziness, tension headache, diarrhea and generalized malaise over the past 48h. Patient also notes recent fracture of right clavicle after fall. Denies cough, SOB, n/v, chest pain, anosmia, however notes abd pain and chills which ave since subsided. While in ED, TMax 103.5, , remainder of VS WNL. Patient began to desaturate on RA to high 80's/low 90's and was started on 2L NC, however was taken off NC shortly after, currently tolerating RA. COVID PCR and RVP was performed and found to be negative. CTAP performed showing diffuse colitis. X-Ray right shoulder shoulder shows distal clavicular displaced angulated comminuted fracture. AC joint arthrosis.   51 y/o Female PMHx of HTN, DM2, GERD, Rojas's, High cholesterol, Obesity, QUINTON, Anxiety, RnY Gastric bypass 6/20 presents to ED for fever, myalgia, dizziness, sore throat, ear pain, dizziness, tension headache, diarrhea and generalized malaise over the past 48h. Patient also notes recent fracture of right clavicle after fall. Denies cough, SOB, n/v, chest pain, anosmia, however notes abd pain and chills which ave since subsided. Denies any sick contacts or Hx or recent travel. While in ED, TMax 103.5, , remainder of VS WNL. Patient began to desaturate on RA to high 80's/low 90's and was started on 2L NC, however was taken off NC shortly after, currently tolerating RA. COVID PCR and RVP was performed and found to be negative. CTAP performed showing diffuse colitis. X-Ray right shoulder shoulder shows distal clavicular displaced angulated comminuted fracture. AC joint arthrosis.

## 2020-11-27 NOTE — PHARMACOTHERAPY INTERVENTION NOTE - COMMENTS
s/w md [facility] 1000, recommended changing [id number] qd since pt is not a covid + patient
Prescriber was contacted with recommendation to adjust frequency to 1 dose only.

## 2020-11-27 NOTE — H&P ADULT - NSHPPHYSICALEXAM_GEN_ALL_CORE
PHYSICAL EXAM:  GEN: No acute distress  HEENT: AT/NC PEERLA, EOMI  LUNGS: Clear to auscultation bilaterally,   HEART: S1/S2 present. RRR. no rubs, murmurs or gallops  ABD: Soft, non-tender, non-distended. Bowel sounds present in all 4 quadrants  EXT: No edema, no rashes, no cyanosis  NEURO: AAOX3, CN 2-12 intact PHYSICAL EXAM:  GEN: Mild acute distress  HEENT: AT/NC PEERLA, EOMI  LUNGS: Clear to auscultation bilaterally,   HEART: S1/S2 present. RRR  ABD: Soft, non-tender, non-distended  EXT: No edema, no rashes, no cyanosis  NEURO: AAOX3, CN 2-12 intact PHYSICAL EXAM:  GEN: Mild acute distress  HEENT: AT/NC PEERLA, EOMI  PULM: Clear to auscultation bilaterally,   CV: S1/S2 present. RRR  GI: Soft, non-tender, non-distended  DERM:  no rashes, no cyanosis  NEURO: CN 2-12 intact, 5/5  PSYCH: AAOX3  MSK: no clubbing or deformities

## 2020-11-27 NOTE — PROGRESS NOTE ADULT - ASSESSMENT
50 years old female with PMH of HTN, DM2, GERD, DLD, QUINTON, Obesity S/P R-Y Gastric bypass 06/20. Presented with 48 hrs of fever, myalgias, sore throat, diarrhea and generalize malaise. CT scan showed wall thickening and inflammatory stranding of cecum, ascending colon and proximal transverse consistent with colitis. Currently refers improvement of symptomatology    Plan:    - Pain control as needed  - Hemodynamic monitoring as per routine  - Diet as tolerated  - IVF: banana bag daily (NS 1000 ml w/additive thiamine 100 mg, Folic Acid 1 mg, multivitamin 10 ml, Mg sulfate 1g)  - Continue ABx   - Encourage ambulation and incentive spirometer use (10x/hr when awake)  - GI and DVT prophylaxis  - Check and replete CBC and BMP   - Strict input and output monitoring  - Continue current management

## 2020-11-27 NOTE — H&P ADULT - NSHPSOCIALHISTORY_GEN_ALL_CORE
Former smoker of 1.5 packs/day x 30 years (45 pack year), Quit 1.5 years ago  Social EtOH use, No use of rec drugs

## 2020-11-27 NOTE — CONSULT NOTE ADULT - SUBJECTIVE AND OBJECTIVE BOX
IGNACIO STEVE  50y, Female  Allergy: No Known Allergies      All historical available data reviewed.    HPI:  49 y/o Female PMHx of HTN, DM2, GERD, Rojas's, High cholesterol, Obesity, QUINTON, Anxiety, RnY Gastric bypass  presents to ED for fever, myalgia, dizziness, sore throat, ear pain, dizziness, tension headache, diarrhea and generalized malaise over the past 48h. Patient also notes recent fracture of right clavicle after fall. Denies cough, SOB, n/v, chest pain, anosmia, however notes abd pain and chills which ave since subsided. Denies any sick contacts or Hx or recent travel. While in ED, TMax 103.5, , remainder of VS WNL. Patient began to desaturate on RA to high 80's/low 90's and was started on 2L NC, however was taken off NC shortly after, currently tolerating RA. COVID PCR and RVP was performed and found to be negative. CTAP performed showing diffuse colitis. X-Ray right shoulder shoulder shows distal clavicular displaced angulated comminuted fracture. AC joint arthrosis.   (2020 00:12)  ID called for colitis    FAMILY HISTORY:  FH: Alzheimers disease    FH: cancer      PAST MEDICAL & SURGICAL HISTORY:  Barretts syndrome    Anxiety    Smoker  not currentSTOPPED 19    GERD (gastroesophageal reflux disease)    Obese    QUINTON (obstructive sleep apnea)  oral device    High cholesterol    DM (diabetes mellitus)    HTN (hypertension)    H/O lumpectomy    S/P hysterectomy            VITALS:  T(F): 100.3, Max: 103.5 (20 @ 21:35)  HR: 95  BP: 117/63  RR: 16Vital Signs Last 24 Hrs  T(C): 37.9 (2020 08:10), Max: 39.7 (2020 21:35)  T(F): 100.3 (2020 08:10), Max: 103.5 (2020 21:35)  HR: 95 (2020 08:10) (95 - 120)  BP: 117/63 (2020 08:10) (108/56 - 134/84)  BP(mean): --  RR: 16 (2020 08:10) (16 - 22)  SpO2: 98% (2020 08:10) (98% - 100%)    TESTS & MEASUREMENTS:                        12.2   7.33  )-----------( 205      ( 2020 17:31 )             37.3         140  |  108  |  15  ----------------------------<  123<H>  3.8   |  17  |  0.7    Ca    8.8      2020 17:31    TPro  6.0  /  Alb  4.0  /  TBili  0.5  /  DBili  x   /  AST  22  /  ALT  19  /  AlkPhos  45      LIVER FUNCTIONS - ( 2020 17:31 )  Alb: 4.0 g/dL / Pro: 6.0 g/dL / ALK PHOS: 45 U/L / ALT: 19 U/L / AST: 22 U/L / GGT: x             Urinalysis Basic - ( 2020 17:43 )    Color: Yellow / Appearance: Clear / S.050 / pH: x  Gluc: x / Ketone: Small  / Bili: Small / Urobili: 3 mg/dL   Blood: x / Protein: 30 mg/dL / Nitrite: Negative   Leuk Esterase: Moderate / RBC: 8 /HPF / WBC 26 /HPF   Sq Epi: x / Non Sq Epi: 7 /HPF / Bacteria: Moderate          RADIOLOGY & ADDITIONAL TESTS:  Personal review of radiological diagnostics performed  Echo and EKG results noted when applicable.     MEDICATIONS:  acetaminophen   Tablet .. 650 milliGRAM(s) Oral every 6 hours PRN  ALPRAZolam 0.5 milliGRAM(s) Oral two times a day PRN  atorvastatin 20 milliGRAM(s) Oral at bedtime  dextrose 40% Gel 15 Gram(s) Oral once  dextrose 5%. 1000 milliLiter(s) IV Continuous <Continuous>  dextrose 5%. 1000 milliLiter(s) IV Continuous <Continuous>  dextrose 50% Injectable 25 Gram(s) IV Push once  dextrose 50% Injectable 12.5 Gram(s) IV Push once  dextrose 50% Injectable 25 Gram(s) IV Push once  enoxaparin Injectable 40 milliGRAM(s) SubCutaneous daily  glucagon  Injectable 1 milliGRAM(s) IntraMuscular once  insulin glargine Injectable (LANTUS) 20 Unit(s) SubCutaneous at bedtime  insulin lispro (ADMELOG) corrective regimen sliding scale   SubCutaneous three times a day before meals  insulin lispro Injectable (ADMELOG) 5 Unit(s) SubCutaneous three times a day before meals  losartan 100 milliGRAM(s) Oral daily  pantoprazole    Tablet 40 milliGRAM(s) Oral before breakfast  ursodiol Capsule 300 milliGRAM(s) Oral two times a day      ANTIBIOTICS:

## 2020-11-27 NOTE — H&P ADULT - ASSESSMENT
49 y/o Female w/ PMHx of HTN, DM2, GERD, Rojas's, High cholesterol, Obesity, QUINTON, Anxiety, RnY Gastric bypass 6/20 presents to ED for fever, myalgia, sore throat, ear pain, diarrhea and generalized malaise over the past 48h. CTAP performed showing diffuse colitis.    #Fever of unknown etiology  - SIRS (+) On Admission for Fever to 103.5, , RR 22  - COVID PCR (-)  - UA moderately (+): LE (+), Nitrates (-), Moderate Bacteria, WBC 26  - CTAP: Long segment bowel wall thickening and inflammatory stranding involving the cecum, ascending colon and proximal transverse colon consistent with colitis which may be infectious or inflammatory in etiology. No evidence of obstruction.   51 y/o Female w/ PMHx of HTN, DM2, GERD, Rojas's, High cholesterol, Obesity, QUINTON, Anxiety, RnY Gastric bypass 6/20 presents to ED for fever, myalgia, sore throat, ear pain, diarrhea and generalized malaise over the past 48h. CTAP performed showing diffuse colitis.    #Fever of unknown etiology  - SIRS (+) On Admission for Fever to 103.5, , RR 22  - COVID PCR (-)  - UA moderately (+): LE (+), Nitrates (-), Moderate Bacteria, WBC 26  - CTAP: Long segment bowel wall thickening and inflammatory stranding involving the cecum, ascending colon and proximal transverse colon consistent with colitis which may be infectious or inflammatory in etiology. No evidence of obstruction.  - f/u ID Consult    # DM2  - Holding home meds (Pioglitazone, Invokana)  - Start on Basal/Bolus w/ Lantus 20, Lispro 5     49 y/o Female w/ PMHx of HTN, DM2, GERD, Rojas's, High cholesterol, Obesity, QUINTON, Anxiety, RnY Gastric bypass 6/20 presents to ED for fever, myalgia, sore throat, ear pain, diarrhea and generalized malaise over the past 48h. CTAP performed showing diffuse colitis.    #Fever of unknown etiology. Possibly Colitis   - SIRS (+) On Admission for Fever to 103.5, , RR 22  - COVID PCR (-), RVP (-)  - UA moderately (+): LE (+), Nitrates (-), Moderate Bacteria, WBC 26  - CTAP: Long segment bowel wall thickening and inflammatory stranding involving the cecum, ascending colon and proximal transverse colon consistent with colitis which may be infectious or inflammatory in etiology. No evidence of obstruction.  - Surgery Consulted: No surgical intervention, Abx serial abdominal exams  - Strict I&O's  - f/u ID Consult  - s/p Ceftriaxone, Cipro and Flagyl in ED  - c/w Ciprofloxacin 500 mg PO qD, Flagyl 500 mg PO q8  - f/u BCx and UCx    # DM2  - Holding home meds (Pioglitazone, Invokana)  - Start on Basal/Bolus w/ Lantus 20, Lispro 5  - Monitor FS     # Hyperlipidemia  - c/w Atorvastatin 20 mg PO qD    # HTN  - Start home dose of Losartan 100 mg PO qD    # Anxiety  - Start home dose of Xanax 0.5 mg PO BID PRN    # Rojas's/GERD  - Holding home dose of Omeprazole   - Start Protonix 40 mg PO qD    #Diet: DASH/ Carb. Consistent   #DVT pro: Lovenox 40 mg sq   #GI pro: Protonix  #Dispo: Acute   49 y/o Female w/ PMHx of HTN, DM2, GERD, Rojas's, High cholesterol, Obesity, QUINTON, Anxiety, RnY Gastric bypass 6/20 presents to ED for fever, myalgia, sore throat, ear pain, diarrhea and generalized malaise over the past 48h. CTAP performed showing diffuse colitis.    #Fever of unknown etiology. Possibly Colitis   - SIRS (+) On Admission for Fever to 103.5, , RR 22  - COVID PCR (-), RVP (-)  - UA moderately (+): LE (+), Nitrates (-), Moderate Bacteria, WBC 26  - CTAP: Long segment bowel wall thickening and inflammatory stranding involving the cecum, ascending colon and proximal transverse colon consistent with colitis which may be infectious or inflammatory in etiology. No evidence of obstruction.  - Surgery Consulted: No surgical intervention, Abx serial abdominal exams  - Strict I&O's  - s/p Ceftriaxone, Cipro and Flagyl in ED  - c/w Ciprofloxacin 400 mg PO q12, Flagyl 500 mg PO q8  - f/u ID Consult  - f/u BCx and UCx    # DM2  - Holding home meds (Pioglitazone, Invokana)  - Start on Basal/Bolus w/ Lantus 20, Lispro 5  - Monitor FS     # Hyperlipidemia  - c/w Atorvastatin 20 mg PO qD    # HTN  - Start home dose of Losartan 100 mg PO qD    # Anxiety  - Start home dose of Xanax 0.5 mg PO BID PRN    # Rojas's/GERD  - Holding home dose of Omeprazole   - Start Protonix 40 mg PO qD    #Diet: DASH/ Carb. Consistent   #DVT pro: Lovenox 40 mg sq   #GI pro: Protonix  #Dispo: Acute   49 y/o Female w/ PMHx of HTN, DM2, GERD, Rojas's, High cholesterol, Obesity, QUINTON, Anxiety, RnY Gastric bypass 6/20 presents to ED for fever, myalgia, sore throat, ear pain, diarrhea and generalized malaise over the past 48h. CTAP performed showing diffuse colitis.    #Fever of unknown etiology. Possibly Colitis   - SIRS (+) On Admission for Fever to 103.5, , RR 22  - COVID PCR (-), RVP (-)  - UA moderately (+): LE (+), Nitrates (-), Moderate Bacteria, WBC 26  - CTAP: Long segment bowel wall thickening and inflammatory stranding involving the cecum, ascending colon and proximal transverse colon consistent with colitis which may be infectious or inflammatory in etiology. No evidence of obstruction.  - Surgery Consulted: No surgical intervention, Abx serial abdominal exams  - Strict I&O's  - s/p Ceftriaxone, Cipro and Flagyl in ED  - c/w Ciprofloxacin 400 mg PO q12, Flagyl 500 mg PO q8  - f/u ID Consult  - f/u BCx and UCx    # DM2  - Holding home meds (Pioglitazone, Invokana)  - Start on Basal/Bolus w/ Lantus 20, Lispro 5  - Monitor FS     # Hyperlipidemia  - c/w Atorvastatin 20 mg PO qD    # HTN  - Start home dose of Losartan 100 mg PO qD    # Anxiety  - Start home dose of Xanax 0.5 mg PO BID PRN    # Rojas's/GERD  - Holding home dose of Omeprazole   - Start Protonix 40 mg PO qD    #Diet: DASH/ Carb. Consistent   #DVT pro: Lovenox 40 mg SQ BID  #GI pro: Protonix  #Dispo: Acute   51 y/o Female w/ PMHx of HTN, DM2, GERD, Rojas's, High cholesterol, Obesity, QUINTON, Anxiety, RnY Gastric bypass 6/20 presents to ED for fever, headache, myalgia, sore throat, ear pain, diarrhea and generalized malaise over the past 2 days. Denies Cough and SOB. CTAP performed showing diffuse colitis.    #Fever and flu-like Sx of unknown etiology. Colitis on CTAP  - SIRS (+) On Admission for Fever to 103.5, , RR 22  - COVID PCR (-), RVP (-)  - UA moderately (+): LE (+), Nitrates (-), Moderate Bacteria, WBC 26  - CTAP: Long segment bowel wall thickening and inflammatory stranding involving the cecum, ascending colon and proximal transverse colon consistent with colitis which may be infectious or inflammatory in etiology. No evidence of obstruction.  - Surgery Consulted: No surgical intervention, Abx serial abdominal exams  - Strict I&O's  - s/p Ceftriaxone, Cipro and Flagyl in ED  - c/w Ciprofloxacin 400 mg PO q12, Flagyl 500 mg PO q8  - f/u ID Consult  - f/u BCx and UCx    # DM 2  - Holding home meds (Pioglitazone, Invokana)  - Start on Basal/Bolus w/ Lantus 20, Lispro 5  - Monitor FS     # Hyperlipidemia  - c/w Atorvastatin 20 mg PO qD    # HTN  - Start home dose of Losartan 100 mg PO qD    # Anxiety  - Start home dose of Xanax 0.5 mg PO BID PRN    # Rojas's/GERD  - Holding home dose of Omeprazole   - Start Protonix 40 mg PO qD    #Diet: DASH/ Carb. Consistent   #DVT pro: Lovenox 40 mg SQ BID  #GI pro: Protonix  #Dispo: Acute   49 y/o Female w/ PMHx of HTN, DM2, GERD, Rojas's, High cholesterol, Obesity, QUINTON, Anxiety, RnY Gastric bypass 6/20 presents to ED for fever, headache, myalgia, sore throat, ear pain, diarrhea and generalized malaise over the past 2 days. Denies Cough and SOB. CTAP performed showing diffuse colitis.    #Fever and flu-like Sx of unknown etiology. Colitis on CTAP  - SIRS (+) On Admission for Fever to 103.5, , RR 22  - COVID PCR (-), RVP (-)  - UA moderately (+): LE (+), Nitrates (-), Moderate Bacteria, WBC 26  - CTAP: Long segment bowel wall thickening and inflammatory stranding involving the cecum, ascending colon and proximal transverse colon consistent with colitis which may be infectious or inflammatory in etiology. No evidence of obstruction.  - Surgery Consulted: No surgical intervention, Abx serial abdominal exams  - Strict I&O's  - s/p Ceftriaxone, Cipro and Flagyl in ED  - c/w Ciprofloxacin 400 mg PO q12, Flagyl 500 mg PO q8  - f/u ID Consult  - f/u BCx and UCx    # DM 2  - Holding home meds (Pioglitazone, Invokana)  - Start on Basal/Bolus w/ Lantus 20, Lispro 5  - Monitor FS     # Hyperlipidemia  - c/w Atorvastatin 20 mg PO qD    # HTN  - Start home dose of Losartan 100 mg PO qD    # Anxiety  - Start home dose of Xanax 0.5 mg PO BID PRN    # Rojas's/GERD  - Holding home dose of Omeprazole   - Start Protonix 40 mg PO qD    #Diet: Clear Liquids, AAT  #DVT pro: Lovenox 40 mg SQ BID  #GI pro: Protonix 40 mg PO qD  #Dispo: Acute   49 y/o Female w/ PMHx of HTN, DM2, GERD, Rojas's, High cholesterol, Obesity, QUINTON, Anxiety, RnY Gastric bypass 6/20 presents to ED for fever, diarrhea,, myalgia, sore throat, headache, dizziness, ear pain, and generalized malaise over the past 2 days. Denies Cough and SOB. CTAP performed showing diffuse colitis.    #Fever and flu-like Sx of unknown etiology. Colitis on CTAP  - SIRS (+) On Admission for Fever to 103.5, , RR 22  - COVID PCR (-), RVP (-)  - UA moderately (+): LE (+), Nitrates (-), Moderate Bacteria, WBC 26  - CTAP: Long segment bowel wall thickening and inflammatory stranding involving the cecum, ascending colon and proximal transverse colon consistent with colitis which may be infectious or inflammatory in etiology. No evidence of obstruction.  - Surgery Consulted: No surgical intervention, Abx serial abdominal exams  - Strict I&O's  - s/p Ceftriaxone, Cipro and Flagyl in ED  - c/w Ciprofloxacin 400 mg PO q12, Flagyl 500 mg PO q8  - f/u ID Consult  - f/u BCx and UCx    # DM 2  - Holding home meds (Pioglitazone, Invokana)  - Start on Basal/Bolus w/ Lantus 20, Lispro 5  - Monitor FS     # Hyperlipidemia  - c/w Atorvastatin 20 mg PO qD    # HTN  - Start home dose of Losartan 100 mg PO qD    # Anxiety  - Start home dose of Xanax 0.5 mg PO BID PRN    # Rojas's/GERD  - Holding home dose of Omeprazole   - Start Protonix 40 mg PO qD    #Diet: Clear Liquids, AAT  #DVT pro: Lovenox 40 mg SQ BID  #GI pro: Protonix 40 mg PO qD  #Dispo: Acute   49 y/o Female w/ PMHx of HTN, DM2, GERD, Rojas's, High cholesterol, Obesity, QUINTON, Anxiety, RnY Gastric bypass 6/20 presents to ED for fever, diarrhea,, myalgia, sore throat, headache, dizziness, ear pain, and generalized malaise over the past 2 days. Denies Cough and SOB. CTAP performed showing diffuse colitis.    #Fever and flu-like Sx of unknown etiology. Colitis on CTAP  - SIRS (+) On Admission for Fever to 103.5, , RR 22  - COVID PCR (-), RVP (-)  - UA moderately (+): LE (+), Nitrates (-), Moderate Bacteria, WBC 26  - CTAP: Long segment bowel wall thickening and inflammatory stranding involving the cecum, ascending colon and proximal transverse colon consistent with colitis which may be infectious or inflammatory in etiology. No evidence of obstruction.  - Surgery Consulted: No surgical intervention, Abx serial abdominal exams  - Strict I&O's  - s/p Ceftriaxone, Cipro and Flagyl in ED  - c/w Ciprofloxacin 400 mg PO q12, Flagyl 500 mg PO q8  - f/u ID Consult  - f/u BCx and UCx    # Clavicular Fracture  - X-Ray Right Humerus: Distal clavicular displaced angulated comminuted fracture. AC joint arthrosis  - Arm Sling for now  - f/u Ortho consult    < end of copied text >      # DM 2  - Holding home meds (Pioglitazone, Invokana)  - Start on Basal/Bolus w/ Lantus 20, Lispro 5  - Monitor FS     # Hyperlipidemia  - c/w Atorvastatin 20 mg PO qD    # HTN  - Start home dose of Losartan 100 mg PO qD    # Anxiety  - Start home dose of Xanax 0.5 mg PO BID PRN    # Rojas's/GERD  - Holding home dose of Omeprazole   - Start Protonix 40 mg PO qD    #Diet: Clear Liquids, AAT  #DVT pro: Lovenox 40 mg SQ BID  #GI pro: Protonix 40 mg PO qD  #Dispo: Acute

## 2020-11-27 NOTE — PROGRESS NOTE ADULT - SUBJECTIVE AND OBJECTIVE BOX
GENERAL SURGERY PROGRESS NOTE     STEVE PIERRE  Female  Hospital day :1d    EVENTS IN THE LAST 24 HRS: No acute events, patient refers improvement of symptoms, still with headaches, myalgias, generalize malaise, refers 3 watery BM yesterday.       T(F): 100.2 (20 @ 05:43), Max: 103.5 (20 @ 21:35)  HR: 98 (20 @ 05:43) (98 - 120)  BP: 118/66 (20 @ 05:43) (108/56 - 134/84)  RR: 18 (20 @ 05:43) (18 - 22)  SpO2: 99% (20 @ 05:43) (98% - 100%)    PHYSICAL EXAM:  GENERAL: NAD, well-appearing  CHEST/LUNG: Clear to auscultation bilaterally  HEART: Regular rate and rhythm  ABDOMEN: Soft, Nontender, Nondistended;   EXTREMITIES:  No clubbing, cyanosis, or edema      DIET/FLUIDS: dextrose 5%. 1000 milliLiter(s) IV Continuous <Continuous>  dextrose 5%. 1000 milliLiter(s) IV Continuous <Continuous>  sodium chloride 0.9% 1000 milliLiter(s) IV Continuous <Continuous>    GI proph:  pantoprazole    Tablet 40 milliGRAM(s) Oral before breakfast    AC/ proph: enoxaparin Injectable 40 milliGRAM(s) SubCutaneous daily    ABx: ciprofloxacin   IVPB 400 milliGRAM(s) IV Intermittent every 12 hours  metroNIDAZOLE  IVPB 500 milliGRAM(s) IV Intermittent every 8 hours    LABS  Labs:  CAPILLARY BLOOD GLUCOSE                           12.2   7.33  )-----------( 205      ( 2020 17:31 )             37.3       Auto Neutrophil %: 87.9 % (20 @ 17:31)  Auto Immature Granulocyte %: 0.4 % (20 @ 17:31)        140  |  108  |  15  ----------------------------<  123<H>  3.8   |  17  |  0.7      Calcium, Total Serum: 8.8 mg/dL (20 @ 17:31)      LFTs:             6.0  | 0.5  | 22       ------------------[45      ( 2020 17:31 )  4.0  | x    | 19          Lipase:19     Amylase:x         Lactate, Blood: 1.3 mmol/L (20 @ 17:31)      Urinalysis Basic - ( 2020 17:43 )    Color: Yellow / Appearance: Clear / S.050 / pH: x  Gluc: x / Ketone: Small  / Bili: Small / Urobili: 3 mg/dL   Blood: x / Protein: 30 mg/dL / Nitrite: Negative   Leuk Esterase: Moderate / RBC: 8 /HPF / WBC 26 /HPF   Sq Epi: x / Non Sq Epi: 7 /HPF / Bacteria: Moderate        RADIOLOGY & ADDITIONAL TESTS:  < from: CT Abdomen and Pelvis w/ Oral Cont and w/ IV Cont (20 @ 20:03) >  IMPRESSION:    Long segment bowel wall thickening and inflammatory stranding involving the cecum, ascending colon and proximal transverse colon consistent with colitis which may be infectious or inflammatory in etiology. No evidence of obstruction.    Status post gastric bypass.    < end of copied text >      A/P

## 2020-11-27 NOTE — CONSULT NOTE ADULT - NEGATIVE GENERAL GENITOURINARY SYMPTOMS
no incontinence/no urinary hesitancy/no flank pain R/no hematuria/no flank pain L/no dysuria/normal urinary frequency

## 2020-11-28 ENCOUNTER — TRANSCRIPTION ENCOUNTER (OUTPATIENT)
Age: 50
End: 2020-11-28

## 2020-11-28 VITALS
HEART RATE: 73 BPM | TEMPERATURE: 98 F | SYSTOLIC BLOOD PRESSURE: 134 MMHG | DIASTOLIC BLOOD PRESSURE: 75 MMHG | RESPIRATION RATE: 18 BRPM

## 2020-11-28 LAB
A1C WITH ESTIMATED AVERAGE GLUCOSE RESULT: 5.2 % — SIGNIFICANT CHANGE UP (ref 4–5.6)
ALBUMIN SERPL ELPH-MCNC: 3.4 G/DL — LOW (ref 3.5–5.2)
ALP SERPL-CCNC: 35 U/L — SIGNIFICANT CHANGE UP (ref 30–115)
ALT FLD-CCNC: 17 U/L — SIGNIFICANT CHANGE UP (ref 0–41)
ANION GAP SERPL CALC-SCNC: 10 MMOL/L — SIGNIFICANT CHANGE UP (ref 7–14)
AST SERPL-CCNC: 20 U/L — SIGNIFICANT CHANGE UP (ref 0–41)
BASOPHILS # BLD AUTO: 0.02 K/UL — SIGNIFICANT CHANGE UP (ref 0–0.2)
BASOPHILS NFR BLD AUTO: 0.6 % — SIGNIFICANT CHANGE UP (ref 0–1)
BILIRUB SERPL-MCNC: 0.2 MG/DL — SIGNIFICANT CHANGE UP (ref 0.2–1.2)
BUN SERPL-MCNC: 12 MG/DL — SIGNIFICANT CHANGE UP (ref 10–20)
CALCIUM SERPL-MCNC: 8.6 MG/DL — SIGNIFICANT CHANGE UP (ref 8.5–10.1)
CHLORIDE SERPL-SCNC: 110 MMOL/L — SIGNIFICANT CHANGE UP (ref 98–110)
CO2 SERPL-SCNC: 21 MMOL/L — SIGNIFICANT CHANGE UP (ref 17–32)
CREAT SERPL-MCNC: 0.6 MG/DL — LOW (ref 0.7–1.5)
CULTURE RESULTS: SIGNIFICANT CHANGE UP
EOSINOPHIL # BLD AUTO: 0.06 K/UL — SIGNIFICANT CHANGE UP (ref 0–0.7)
EOSINOPHIL NFR BLD AUTO: 1.9 % — SIGNIFICANT CHANGE UP (ref 0–8)
ESTIMATED AVERAGE GLUCOSE: 103 MG/DL — SIGNIFICANT CHANGE UP (ref 68–114)
FLU A RESULT: NEGATIVE — SIGNIFICANT CHANGE UP
FLU A RESULT: NEGATIVE — SIGNIFICANT CHANGE UP
FLUAV AG NPH QL: NEGATIVE — SIGNIFICANT CHANGE UP
FLUBV AG NPH QL: NEGATIVE — SIGNIFICANT CHANGE UP
GLUCOSE BLDC GLUCOMTR-MCNC: 88 MG/DL — SIGNIFICANT CHANGE UP (ref 70–99)
GLUCOSE BLDC GLUCOMTR-MCNC: 88 MG/DL — SIGNIFICANT CHANGE UP (ref 70–99)
GLUCOSE SERPL-MCNC: 90 MG/DL — SIGNIFICANT CHANGE UP (ref 70–99)
HCT VFR BLD CALC: 33 % — LOW (ref 37–47)
HGB BLD-MCNC: 10.6 G/DL — LOW (ref 12–16)
IMM GRANULOCYTES NFR BLD AUTO: 0.3 % — SIGNIFICANT CHANGE UP (ref 0.1–0.3)
LYMPHOCYTES # BLD AUTO: 0.77 K/UL — LOW (ref 1.2–3.4)
LYMPHOCYTES # BLD AUTO: 24.8 % — SIGNIFICANT CHANGE UP (ref 20.5–51.1)
MAGNESIUM SERPL-MCNC: 2 MG/DL — SIGNIFICANT CHANGE UP (ref 1.8–2.4)
MCHC RBC-ENTMCNC: 29.5 PG — SIGNIFICANT CHANGE UP (ref 27–31)
MCHC RBC-ENTMCNC: 32.1 G/DL — SIGNIFICANT CHANGE UP (ref 32–37)
MCV RBC AUTO: 91.9 FL — SIGNIFICANT CHANGE UP (ref 81–99)
MONOCYTES # BLD AUTO: 0.26 K/UL — SIGNIFICANT CHANGE UP (ref 0.1–0.6)
MONOCYTES NFR BLD AUTO: 8.4 % — SIGNIFICANT CHANGE UP (ref 1.7–9.3)
NEUTROPHILS # BLD AUTO: 1.98 K/UL — SIGNIFICANT CHANGE UP (ref 1.4–6.5)
NEUTROPHILS NFR BLD AUTO: 64 % — SIGNIFICANT CHANGE UP (ref 42.2–75.2)
NRBC # BLD: 0 /100 WBCS — SIGNIFICANT CHANGE UP (ref 0–0)
PLATELET # BLD AUTO: 155 K/UL — SIGNIFICANT CHANGE UP (ref 130–400)
POTASSIUM SERPL-MCNC: 3.8 MMOL/L — SIGNIFICANT CHANGE UP (ref 3.5–5)
POTASSIUM SERPL-SCNC: 3.8 MMOL/L — SIGNIFICANT CHANGE UP (ref 3.5–5)
PROT SERPL-MCNC: 5.4 G/DL — LOW (ref 6–8)
RBC # BLD: 3.59 M/UL — LOW (ref 4.2–5.4)
RBC # FLD: 13.5 % — SIGNIFICANT CHANGE UP (ref 11.5–14.5)
RSV RESULT: NEGATIVE — SIGNIFICANT CHANGE UP
RSV RNA RESP QL NAA+PROBE: NEGATIVE — SIGNIFICANT CHANGE UP
SODIUM SERPL-SCNC: 141 MMOL/L — SIGNIFICANT CHANGE UP (ref 135–146)
SPECIMEN SOURCE: SIGNIFICANT CHANGE UP
WBC # BLD: 3.1 K/UL — LOW (ref 4.8–10.8)
WBC # FLD AUTO: 3.1 K/UL — LOW (ref 4.8–10.8)

## 2020-11-28 PROCEDURE — 99239 HOSP IP/OBS DSCHRG MGMT >30: CPT

## 2020-11-28 PROCEDURE — 99232 SBSQ HOSP IP/OBS MODERATE 35: CPT

## 2020-11-28 RX ORDER — ACETAMINOPHEN 500 MG
2 TABLET ORAL
Qty: 0 | Refills: 0 | DISCHARGE
Start: 2020-11-28

## 2020-11-28 RX ADMIN — PANTOPRAZOLE SODIUM 40 MILLIGRAM(S): 20 TABLET, DELAYED RELEASE ORAL at 05:24

## 2020-11-28 RX ADMIN — URSODIOL 300 MILLIGRAM(S): 250 TABLET, FILM COATED ORAL at 05:25

## 2020-11-28 RX ADMIN — ENOXAPARIN SODIUM 40 MILLIGRAM(S): 100 INJECTION SUBCUTANEOUS at 13:14

## 2020-11-28 RX ADMIN — LOSARTAN POTASSIUM 100 MILLIGRAM(S): 100 TABLET, FILM COATED ORAL at 05:24

## 2020-11-28 NOTE — DISCHARGE NOTE PROVIDER - NSDCMRMEDTOKEN_GEN_ALL_CORE_FT
acetaminophen 325 mg oral tablet: 2 tab(s) orally every 6 hours, As needed, Temp greater or equal to 38C (100.4F), Mild Pain (1 - 3), Moderate Pain (4 - 6)  ALPRAZolam 0.5 mg oral tablet: 0.5 milligram(s) orally 2 times a day  atorvastatin 20 mg oral tablet: 1 tab(s) orally once a day  Invokana 300 mg oral tablet: 1 tab(s) orally once a day  losartan 100 mg oral tablet: 1 tab(s) orally once a day  omeprazole 40 mg oral delayed release capsule: 1 cap(s) orally once a day  pioglitazone 30 mg oral tablet: 1 tab(s) orally once a day  ursodiol 300 mg oral tablet: 300 milligram(s) orally 2 times a day

## 2020-11-28 NOTE — OCCUPATIONAL THERAPY INITIAL EVALUATION ADULT - PATIENT/FAMILY/SIGNIFICANT OTHER GOALS STATEMENT, OT EVAL
Pt lives with family in ranch style home with one step to enter and tub shower. 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

## 2020-11-28 NOTE — DISCHARGE NOTE PROVIDER - CARE PROVIDERS DIRECT ADDRESSES
,DirectAddress_Unknown ,DirectAddress_Unknown,[email or web address]

## 2020-11-28 NOTE — PROGRESS NOTE ADULT - SUBJECTIVE AND OBJECTIVE BOX
HPI:  51 y/o Female PMHx of HTN, DM2, GERD, Rojas's, High cholesterol, Obesity, QUINTON, Anxiety, RnY Gastric bypass  presents to ED for fever, myalgia, dizziness, sore throat, ear pain, dizziness, tension headache, diarrhea and generalized malaise over the past 48h. Patient also notes recent fracture of right clavicle after fall. Denies cough, SOB, n/v, chest pain, anosmia, however notes abd pain and chills which ave since subsided. Denies any sick contacts or Hx or recent travel. While in ED, TMax 103.5, , remainder of VS WNL. Patient began to desaturate on RA to high 80's/low 90's and was started on 2L NC, however was taken off NC shortly after, currently tolerating RA. COVID PCR and RVP was performed and found to be negative. CTAP performed showing diffuse colitis. X-Ray right shoulder shoulder shows distal clavicular displaced angulated comminuted fracture. AC joint arthrosis.   (2020 00:12)    Currently admitted to medicine with the primary diagnosis of Colitis       Today is hospital day 2d.     INTERVAL HPI / OVERNIGHT EVENTS:  Patient was examined and seen at bedside. This morning she is resting comfortably in bed and reports no new issues or overnight events.     ROS: Otherwise unremarkable     PAST MEDICAL & SURGICAL HISTORY  Barretts syndrome    Anxiety    Smoker  not currentSTOPPED 19    GERD (gastroesophageal reflux disease)    Obese    QUINTON (obstructive sleep apnea)  oral device    High cholesterol    DM (diabetes mellitus)    HTN (hypertension)    H/O lumpectomy    S/P hysterectomy        ALLERGIES  No Known Allergies    MEDICATIONS  STANDING MEDICATIONS  atorvastatin 20 milliGRAM(s) Oral at bedtime  dextrose 40% Gel 15 Gram(s) Oral once  dextrose 5%. 1000 milliLiter(s) IV Continuous <Continuous>  dextrose 5%. 1000 milliLiter(s) IV Continuous <Continuous>  dextrose 50% Injectable 25 Gram(s) IV Push once  dextrose 50% Injectable 12.5 Gram(s) IV Push once  dextrose 50% Injectable 25 Gram(s) IV Push once  enoxaparin Injectable 40 milliGRAM(s) SubCutaneous daily  glucagon  Injectable 1 milliGRAM(s) IntraMuscular once  influenza   Vaccine 0.5 milliLiter(s) IntraMuscular once  insulin glargine Injectable (LANTUS) 20 Unit(s) SubCutaneous at bedtime  insulin lispro (ADMELOG) corrective regimen sliding scale   SubCutaneous three times a day before meals  insulin lispro Injectable (ADMELOG) 5 Unit(s) SubCutaneous three times a day before meals  losartan 100 milliGRAM(s) Oral daily  pantoprazole    Tablet 40 milliGRAM(s) Oral before breakfast  sodium chloride 0.9% 1000 milliLiter(s) IV Continuous <Continuous>  ursodiol Capsule 300 milliGRAM(s) Oral two times a day    PRN MEDICATIONS  acetaminophen   Tablet .. 650 milliGRAM(s) Oral every 6 hours PRN  ALPRAZolam 0.5 milliGRAM(s) Oral two times a day PRN    VITALS:  T(F): 97.6  HR: 80  BP: 116/62  RR: 17  SpO2: 99%    PHYSICAL EXAM  GEN: NAD, Resting comfortably in bed  PULM: Clear to auscultation bilaterally, No wheezes  CVS: Regular rate and rhythm, S1-S2, no murmurs  ABD: Soft, non-tender, non-distended, no guarding  EXT: No edema  NEURO: A&Ox3, no focal deficits    LABS                        12.2   7.33  )-----------( 205      ( 2020 17:31 )             37.3     11-26    140  |  108  |  15  ----------------------------<  123<H>  3.8   |  17  |  0.7    Ca    8.8      2020 17:31    TPro  6.0  /  Alb  4.0  /  TBili  0.5  /  DBili  x   /  AST  22  /  ALT  19  /  AlkPhos  45  11-      Urinalysis Basic - ( 2020 17:43 )    Color: Yellow / Appearance: Clear / S.050 / pH: x  Gluc: x / Ketone: Small  / Bili: Small / Urobili: 3 mg/dL   Blood: x / Protein: 30 mg/dL / Nitrite: Negative   Leuk Esterase: Moderate / RBC: 8 /HPF / WBC 26 /HPF   Sq Epi: x / Non Sq Epi: 7 /HPF / Bacteria: Moderate            Culture - Blood (collected 2020 17:06)  Source: .Blood Blood  Preliminary Report (2020 01:02):    No growth to date.          RADIOLOGY

## 2020-11-28 NOTE — DISCHARGE NOTE PROVIDER - NSDCCPCAREPLAN_GEN_ALL_CORE_FT
PRINCIPAL DISCHARGE DIAGNOSIS  Diagnosis: Viral syndrome  Assessment and Plan of Treatment: You presented with symptoms consistent with viral syndrome. Your COVID test was negative and your RVP was negative. You were seen by our infectious disease specialist and it is believed you were infected with the influenza virus given your presenting symptoms. Please follow up with your primary care doctor upon discharge, and please seek medical attention if your symptoms do not continue to improve or worsen.      SECONDARY DISCHARGE DIAGNOSES  Diagnosis: Clavicular fracture  Assessment and Plan of Treatment: Please follow up with Orthopedic surgery after discharge for further evaluation of your clavicle bone fracture. Please avoid use of this arm and keep it in a sling daily.    Diagnosis: Colitis  Assessment and Plan of Treatment: Your CT of the abdomen showed evidence of collitis, which is likely due to a viral infection. No antibiotics were recommended by our infectious disease specialist. Pleas continue to monitor you symptoms, and seek medical attention if they do not resolve in time after discharge.     PRINCIPAL DISCHARGE DIAGNOSIS  Diagnosis: Viral syndrome  Assessment and Plan of Treatment: You presented with symptoms consistent with viral syndrome. Your COVID test was negative and your RVP was negative. You were seen by our infectious disease specialist and it is believed you were infected with the influenza virus given your presenting symptoms. Please follow up with your primary care doctor upon discharge, and please seek medical attention if your symptoms do not continue to improve or worsen.      SECONDARY DISCHARGE DIAGNOSES  Diagnosis: Clavicular fracture  Assessment and Plan of Treatment: Please follow up with Orthopedic surgery after discharge for further evaluation of your clavicle bone fracture. Please avoid use of this arm and keep it in a sling daily.    Diagnosis: Colitis  Assessment and Plan of Treatment: Your CT of the abdomen showed evidence of collitis, which is likely due to a viral infection. No antibiotics were recommended by our infectious disease specialist. Pleas continue to monitor you symptoms, and seek medical attention if they do not resolve in time after discharge.  COLITIS 2/2 VIRAL ILLNESS (NOT BACTERIAL INFECTION AND NO ABX NEEDED) PER INFECTIOUS DISEASE.     PRINCIPAL DISCHARGE DIAGNOSIS  Diagnosis: Viral syndrome  Assessment and Plan of Treatment: You presented with symptoms consistent with viral syndrome. Your COVID test was negative and your RVP was negative. You were seen by our infectious disease specialist and it is believed you were infected with the influenza virus given your presenting symptoms. Please follow up with your primary care doctor upon discharge, and please seek medical attention if your symptoms do not continue to improve or worsen.      SECONDARY DISCHARGE DIAGNOSES  Diagnosis: Clavicular fracture  Assessment and Plan of Treatment: Please follow up with Orthopedic surgery after discharge for further evaluation of your clavicle bone fracture. Please avoid use of this arm and keep it in a sling daily.  Keep regularly scheduled appt with Dr. Nagel, please call [phone] to confirm appointment.  Return to ED with uncontrolled pain/bleeding/fever/chills/numbness/tingling/cool extremity/inability to move extremity.      Diagnosis: Colitis  Assessment and Plan of Treatment: Your CT of the abdomen showed evidence of collitis, which is likely due to a viral infection. No antibiotics were recommended by our infectious disease specialist. Pleas continue to monitor you symptoms, and seek medical attention if they do not resolve in time after discharge.  COLITIS 2/2 VIRAL ILLNESS (NOT BACTERIAL INFECTION AND NO ABX NEEDED) PER INFECTIOUS DISEASE.

## 2020-11-28 NOTE — DISCHARGE NOTE PROVIDER - PROVIDER TOKENS
PROVIDER:[TOKEN:[[id number]]] PROVIDER:[TOKEN:[[id number]]],PROVIDER:[TOKEN:[[id number]],FOLLOWUP:[2 weeks]]

## 2020-11-28 NOTE — DISCHARGE NOTE PROVIDER - HOSPITAL COURSE
This is a 50 year old female with a PMHx of T2DM, Obesity s/p RnY gastric bypass, Rojas's esophagus, HTN and HLD who presented to the ED with a cc/o fever, sore throat, headache, generalized malaise, and diarrhea. She also complained of ear pain and dizziness. She ha dno sick contacts and did not travel recently. She did desaturate on RA to upper 80s and low 90s temporarily in the ED, but this quickly resolved. She had a fever of 103.5 and  at the time of admission. CT A/P showed evidence of colitis which can be attributed to viral infection. By the following morning, her symptoms had significantly improved. She was afebrile and her diarrhea had subsided.    She also has a known clavicular fracture and will need to follow up with orthopedic surgery. This is a 50 year old female with a PMHx of T2DM, Obesity s/p RnY gastric bypass, Rojas's esophagus, HTN and HLD who presented to the ED with a cc/o fever, sore throat, headache, generalized malaise, and diarrhea. She also complained of ear pain and dizziness. She ha dno sick contacts and did not travel recently. She did desaturate on RA to upper 80s and low 90s temporarily in the ED, but this quickly resolved. She had a fever of 103.5 and  at the time of admission. CT A/P showed evidence of colitis which can be attributed to viral infection. By the following morning, her symptoms had significantly improved. She was afebrile and her diarrhea had subsided.    She also has a known clavicular fracture and will need to follow up with orthopedic surgery.     Attending Attestation  Pt has been seen and examined. Case and Plan discussed at rounds and agree with this d/c summary.   Pt is feeling good enough for discharge and f/u with PMD. For details see PN from today This is a 50 year old female with a PMHx of T2DM, Obesity s/p RnY gastric bypass, Rojas's esophagus, HTN and HLD who presented to the ED with a cc/o fever, sore throat, headache, generalized malaise, and diarrhea. She also complained of ear pain and dizziness. She ha dno sick contacts and did not travel recently. She did desaturate on RA to upper 80s and low 90s temporarily in the ED, but this quickly resolved. She had a fever of 103.5 and  at the time of admission. CT A/P showed evidence of colitis which can be attributed to viral infection. By the following morning, her symptoms had significantly improved. She was afebrile and her diarrhea had subsided.    She also has a known clavicular fracture and will need to follow up with orthopedic surgery. Orthopedics consulted while patient was in the hospital; recommend that patient remain non weight bearing on the right upper extremity, manage pain, use sling for comfort and follow up with Dr. Nagel, please call [phone] to confirm appt.  Return to ED with uncontrolled pain/bleeding/fever/chills/numbness/tingling/cool extremity/inability to move extremity.    Attending Attestation  Pt has been seen and examined. Case and Plan discussed at rounds and agree with this d/c summary.   Pt is feeling good enough for discharge and f/u with PMD. For details see PN from today

## 2020-11-28 NOTE — CONSULT NOTE ADULT - SUBJECTIVE AND OBJECTIVE BOX
Orthopaedic Surgery Consult Note    STEVE PIERRE  [phone]    51yo Female RHD admitted 11/26 with flu like symptoms. COVID neg, flu neg. Orthopedics consulted today because of r clavicle fracture. Pt states clavicle fx is 3 weeks old. Denies any new injuries to the shoulder. She is following with Dr. Nagel as an outpt and has an appointment scheduled in December for 6 week follow up. She is wearing the sling as needed for comfort.    PMH/PSH  COLITIS;SEPSIS;UTI(URINARY TRACT INFECTION)  ^COLITIS;SEPSIS;UTI(URINARY TRACT INFECTION)  FH: Alzheimers disease  FH: cancer  Handoff  MEWS Score  Barretts syndrome  Anxiety  Smoker  GERD (gastroesophageal reflux disease)  Obese  QUINTON (obstructive sleep apnea)  High cholesterol  DM (diabetes mellitus)  HTN (hypertension)  Viral syndrome  Colitis  H/O lumpectomy  S/P hysterectomy  DIZZINESS  26  Clavicular fracture  Colitis  UTI (urinary tract infection)  Sepsis  [email or web address]        Medications  acetaminophen   Tablet .. 650 milliGRAM(s) Oral every 6 hours PRN  ALPRAZolam 0.5 milliGRAM(s) Oral two times a day PRN  atorvastatin 20 milliGRAM(s) Oral at bedtime  enoxaparin Injectable 40 milliGRAM(s) SubCutaneous daily  glucagon  Injectable 1 milliGRAM(s) IntraMuscular once  influenza   Vaccine 0.5 milliLiter(s) IntraMuscular once  insulin glargine Injectable (LANTUS) 20 Unit(s) SubCutaneous at bedtime  insulin lispro (ADMELOG) corrective regimen sliding scale   SubCutaneous three times a day before meals  insulin lispro Injectable (ADMELOG) 5 Unit(s) SubCutaneous three times a day before meals  losartan 100 milliGRAM(s) Oral daily  pantoprazole    Tablet 40 milliGRAM(s) Oral before breakfast  sodium chloride 0.9% 1000 milliLiter(s) IV Continuous <Continuous>  ursodiol Capsule 300 milliGRAM(s) Oral two times a day      Allergies  No Known Allergies    T(C): 35.9 (11-28-20 @ 08:00), Max: 36.7 (11-27-20 @ 16:54)  HR: 72 (11-28-20 @ 08:00) (72 - 80)  BP: 138/70 (11-28-20 @ 08:00) (114/73 - 141/79)  RR: 18 (11-28-20 @ 08:00) (17 - 18)  SpO2: 99% (11-27-20 @ 16:54) (99% - 99%)    P/E:  NAD  Non-labored breathing    RUE  Skin intact  No swelling/erythema/ecchymosis noted  No deformity/laceration/abrasion noted  Full ROM of the shoulder and elbow  No TTP over R clavicle  SILT Ax/LABCN/R/M/U  AIN/PIN/U intact  Radial pulse 2+, cap refill <2      Labs                        10.6   3.10  )-----------( 155      ( 28 Nov 2020 07:34 )             33.0     11-28    141  |  110  |  12  ----------------------------<  90  3.8   |  21  |  0.6<L>    Ca    8.6      28 Nov 2020 07:34  Mg     2.0     11-28    TPro  5.4<L>  /  Alb  3.4<L>  /  TBili  0.2  /  DBili  x   /  AST  20  /  ALT  17  /  AlkPhos  35  11-28    LIVER FUNCTIONS - ( 28 Nov 2020 07:34 )  Alb: 3.4 g/dL / Pro: 5.4 g/dL / ALK PHOS: 35 U/L / ALT: 17 U/L / AST: 20 U/L / GGT: x               Imaging:  XR Chest - Mildly displaced distal clavicle fracture with interval healing since previous XR from 10/31    A/P:  51yo Female with closed R distal clavicle fx sustained on 10/31. Following with Dr. Nagel as an outpt. Next appt scheduled for December. Fx healing well. She was told she no longer needs the sling and can use it for comfort when she needs it    Weight bearing: NWB RUE  Pain control  Sling PRN for comfort  AROM of RUE encouraged  Keep regularly scheduled appt with Dr. Nagel, please call [phone] to confirm appt  Return to ED with uncontrolled pain/bleeding/fever/chills/numbness/tingling/cool extremity/inability to move extremity

## 2020-11-28 NOTE — DISCHARGE NOTE NURSING/CASE MANAGEMENT/SOCIAL WORK - PATIENT PORTAL LINK FT
You can access the FollowMyHealth Patient Portal offered by [facility] by registering at the following website: [email or web address]. By joining [facility]’s FollowMyHealth portal, you will also be able to view your health information using other applications (apps) compatible with our system.

## 2020-11-28 NOTE — DISCHARGE NOTE NURSING/CASE MANAGEMENT/SOCIAL WORK - MODE OF TRANSPORTATION
>>> For her Bronchitis:  1. Please give your child their antibiotics as recommended  2. push fluids as tolerated  3. consider running a vaporizer or a humidifier in the home  4. avoid over the counter cough medications  - honey and lemon on spoon by mouth is ok ONLY IF 12 MONTHS OR OLDER, due to the risk of botulism when used under 1 year of age.  - gargling with salt water is ok for cough or congestion  - do not use multi-symptom acetaminophen or ibuprofen for cough or congestion  5. VapoRub on the chest is fine for comfort ONLY IF 6 MONTHS OR OLDER; otherwise, you can use it in a vaporizer   6. suction the nose if needed with a suction bulb or nose amirah with saline mist that is available over the counter  7. Avoid sharing cups, spoons, straws; also try to keep tooth brushes separate in common containers  >>>  2. Vaccines due today: on hold given infection and not assigned to our [facility] stock.  3. Labs due today: yes  4. Acetaminophen and ibuprofen are used for pain or fever, not or colds or congestion.  - fever is rectal 100.4F, axillary or temporal 99.4F  - AVOID OVER THE COUNTER COUGH MEDICATIONS AS THEY CAN BE DEADLY IN CHILDREN.  7. School form: yes, but shots are not up to date  8. Sports form: no  9. A child should get, including naps, in a 24 hour period:      6 to 12 year olds: 9-12 hours of sleep  10. The next well child exam is in one year.    
Ambulatory

## 2020-11-28 NOTE — DISCHARGE NOTE PROVIDER - CARE PROVIDER_API CALL
Ananda Dumont  [id number]  [address] [place] [address]  Phone: [phone]  Fax: [phone]  Follow Up Time:    Ananda Dumont  [id number]  [address] [place] [address]  Phone: [phone]  Fax: [phone]  Follow Up Time:     Jony Nagel)  Orthopaedic Surgery  [address] [place] [address]  Phone: [phone]  Fax: [phone]  Follow Up Time: 2 weeks

## 2020-11-28 NOTE — OCCUPATIONAL THERAPY INITIAL EVALUATION ADULT - PERTINENT HX OF CURRENT PROBLEM, REHAB EVAL
Presented to ED for fever, myalgia, dizziness, sore throat, ear pain, dizziness, tension headache, diarrhea and generalized malaise over pat 48 hr.  Pt also notes recent fracture of right clavicle after fall.

## 2020-11-28 NOTE — PROGRESS NOTE ADULT - ASSESSMENT
51 y/o Female w/ PMHx of HTN, DM2, GERD, Rojas's, High cholesterol, Obesity, QUINTON, Anxiety, RnY Gastric bypass 6/20 presents to ED for fever, diarrhea,, myalgia, sore throat, headache, dizziness, ear pain, and generalized malaise over the past 2 days. Denies Cough and SOB. CTAP performed showing diffuse colitis.    #Fever and flu-like Sx of unknown etiology. Colitis on CTAP  - SIRS (+) On Admission for Fever to 103.5, , RR 22  - COVID PCR (-), RVP (-)  - UA moderately (+): LE (+), Nitrates (-), Moderate Bacteria, WBC 26  - CTAP: Long segment bowel wall thickening and inflammatory stranding involving the cecum, ascending colon and proximal transverse colon consistent with colitis which may be infectious or inflammatory in etiology. No evidence of obstruction.  - Surgery Consulted: No surgical intervention, Abx serial abdominal exams  - Strict I&O's  - s/p Ceftriaxone, Cipro and Flagyl in ED, Ciprofloxacin 400 mg PO q12, Flagyl 500 mg PO q8  -ID recommendations; See no need for ABx or need for CT with contrast ( colitis part of the constitutional manifestations of a viral infection )  Repeat a Infuenza swab ( not a viral panel ). If positive po Tamiflu 75 mg q12h for 5 days  - f/u BCx and UCx    # Clavicular Fracture  - X-Ray Right Humerus: Distal clavicular displaced angulated comminuted fracture. AC joint arthrosis  - Arm Sling for now  - f/u Ortho consult    # DM 2  - Holding home meds (Pioglitazone, Invokana)  - Start on Basal/Bolus w/ Lantus 20, Lispro 5  - Monitor FS     # Hyperlipidemia  - c/w Atorvastatin 20 mg PO qD    # HTN  - Start home dose of Losartan 100 mg PO qD    # Anxiety  - Start home dose of Xanax 0.5 mg PO BID PRN    # Rojas's/GERD  - Holding home dose of Omeprazole   - Start Protonix 40 mg PO qD    #Diet: Clear Liquids, AAT  #DVT pro: Lovenox 40 mg SQ BID  #GI pro: Protonix 40 mg PO qD  #Dispo: Acute

## 2020-11-28 NOTE — PROGRESS NOTE ADULT - ATTENDING COMMENTS
Patient seen and examined with surgery resident on rounds and discussed management plans. Able to eat with some diarrhea now on antibiotics for colitis . Can be discharged on po antibiotics.

## 2020-11-28 NOTE — PROGRESS NOTE ADULT - ASSESSMENT
50 years old female with PMH of HTN, DM2, GERD, DLD, QUINTON, Obesity S/P R-Y Gastric bypass 06/20. Presented with 48 hrs of fever, myalgias, sore throat, diarrhea and generalize malaise. CT scan showed wall thickening and inflammatory stranding of cecum, ascending colon and proximal transverse consistent with colitis. Currently refers improvement of symptomatology    Plan:    - Pain control as needed  - Hemodynamic monitoring as per routine  - Diet as tolerated  - IVF: banana bag daily (NS 1000 ml w/additive thiamine 100 mg, Folic Acid 1 mg, multivitamin 10 ml, Mg sulfate 1g)  - Continue ABx   - Encourage ambulation and incentive spirometer use (10x/hr when awake)  - GI and DVT prophylaxis  - Check and replete CBC and BMP   - Strict input and output monitoring  - Continue current management 50 years old female with PMH of HTN, DM2, GERD, DLD, QUINTON, Obesity S/P R-Y Gastric bypass 06/20. Presented with 48 hrs of fever, myalgias, sore throat, diarrhea and generalize malaise. CT scan showed wall thickening and inflammatory stranding of cecum, ascending colon and proximal transverse consistent with colitis. Currently refers improvement of symptomatology    Plan:    - Pain control as needed  - Hemodynamic monitoring as per routine  - Diet as tolerated  - IVF: banana bag daily (NS 1000 ml w/additive thiamine 100 mg, Folic Acid 1 mg, multivitamin 10 ml, Mg sulfate 1g)  - Continue ABx   - Encourage ambulation and incentive spirometer use (10x/hr when awake)  - GI and DVT prophylaxis  - Check and replete CBC and BMP   - Strict input and output monitoring  - Continue current management  - Call us PRN any concerns or questions

## 2020-11-28 NOTE — CONSULT NOTE ADULT - REASON FOR ADMISSION
Fever, Dizziness, Diarrhea, Sore Throat, Myalgia, Headache
Fever, Dizziness, Diarrhea, Sore Throat, Myalgia, Headache

## 2020-12-02 DIAGNOSIS — Z90.710 ACQUIRED ABSENCE OF BOTH CERVIX AND UTERUS: ICD-10-CM

## 2020-12-02 DIAGNOSIS — Z79.899 OTHER LONG TERM (CURRENT) DRUG THERAPY: ICD-10-CM

## 2020-12-02 DIAGNOSIS — E11.9 TYPE 2 DIABETES MELLITUS WITHOUT COMPLICATIONS: ICD-10-CM

## 2020-12-02 DIAGNOSIS — G47.33 OBSTRUCTIVE SLEEP APNEA (ADULT) (PEDIATRIC): ICD-10-CM

## 2020-12-02 DIAGNOSIS — R42 DIZZINESS AND GIDDINESS: ICD-10-CM

## 2020-12-02 DIAGNOSIS — S42.001D FRACTURE OF UNSPECIFIED PART OF RIGHT CLAVICLE, SUBSEQUENT ENCOUNTER FOR FRACTURE WITH ROUTINE HEALING: ICD-10-CM

## 2020-12-02 DIAGNOSIS — K52.9 NONINFECTIVE GASTROENTERITIS AND COLITIS, UNSPECIFIED: ICD-10-CM

## 2020-12-02 DIAGNOSIS — F41.9 ANXIETY DISORDER, UNSPECIFIED: ICD-10-CM
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
## 2020-12-02 DIAGNOSIS — Z79.84 LONG TERM (CURRENT) USE OF ORAL HYPOGLYCEMIC DRUGS: ICD-10-CM

## 2020-12-02 DIAGNOSIS — E66.9 OBESITY, UNSPECIFIED: ICD-10-CM

## 2020-12-02 DIAGNOSIS — B34.9 VIRAL INFECTION, UNSPECIFIED: ICD-10-CM

## 2020-12-02 DIAGNOSIS — Z87.891 PERSONAL HISTORY OF NICOTINE DEPENDENCE: ICD-10-CM

## 2020-12-02 DIAGNOSIS — I10 ESSENTIAL (PRIMARY) HYPERTENSION: ICD-10-CM

## 2020-12-02 DIAGNOSIS — E78.5 HYPERLIPIDEMIA, UNSPECIFIED: ICD-10-CM

## 2020-12-02 DIAGNOSIS — K22.70 BARRETT'S ESOPHAGUS WITHOUT DYSPLASIA: ICD-10-CM

## 2020-12-02 DIAGNOSIS — Z98.84 BARIATRIC SURGERY STATUS: ICD-10-CM

## 2020-12-02 DIAGNOSIS — K21.9 GASTRO-ESOPHAGEAL REFLUX DISEASE WITHOUT ESOPHAGITIS: ICD-10-CM

## 2020-12-02 LAB
CULTURE RESULTS: SIGNIFICANT CHANGE UP
SPECIMEN SOURCE: SIGNIFICANT CHANGE UP

## 2020-12-30 ENCOUNTER — APPOINTMENT (OUTPATIENT)
Dept: SURGERY | Facility: CLINIC | Age: 50
End: 2020-12-30
Payer: COMMERCIAL

## 2020-12-30 DIAGNOSIS — E55.9 VITAMIN D DEFICIENCY, UNSPECIFIED: ICD-10-CM

## 2020-12-30 DIAGNOSIS — K22.719 BARRETT'S ESOPHAGUS WITH DYSPLASIA, UNSPECIFIED: ICD-10-CM

## 2020-12-30 DIAGNOSIS — G47.33 OBSTRUCTIVE SLEEP APNEA (ADULT) (PEDIATRIC): ICD-10-CM

## 2020-12-30 DIAGNOSIS — K21.9 GASTRO-ESOPHAGEAL REFLUX DISEASE W/OUT ESOPHAGITIS: ICD-10-CM

## 2020-12-30 PROCEDURE — 99241 OFFICE CONSULTATION NEW/ESTAB PATIENT 15 MIN: CPT | Mod: GT

## 2020-12-30 NOTE — ASSESSMENT
[FreeTextEntry1] : 51yo female with hypertension, QUINTON, DM, HLD, GERD, honeycutt's esophagus and morbid obesity BMI 38 s/p laparoscopic gastric bypass 6/29/2020.\par -Tolerating bariatric diet - avoid carbs (pasta and cookies)\par -continue drinking plenty of water\par -continue protein intake - goal 70g per day, drinking 1-2 protein shakes per day and high protein foods\par -no longer taking PPI, no reflux symptoms\par -continue daily vitamins, B12, calcium\par -labs reviewed - vitamin D a little low, patient started taking vitamins again after stopping for a little while\par -discontinue actigal\par -continue regular exercise - encouraged resistance training in addition to cardio\par -current medications reviewed\par -return to office for 9 month follow up\par -call with concerns

## 2020-12-30 NOTE — HISTORY OF PRESENT ILLNESS
[Procedure: ___] : Procedure performed: [unfilled]  [Date of Surgery: ___] : Date of Surgery:   [unfilled] [Surgeon Name:   ___] : Surgeon Name: Dr. MEJIA [___ Months Post Op] : [unfilled] months [de-identified] : 51yo female with PMHx of DM, HLD, HTN, GERD, Anxiety, obesity with BMI 38.7 s/p laparoscopic gastric bypass 6/29/2020. At this time, patient states she is feeling well with high energy levels, been very active at home. Broke her clavicle 10/2020 and has been recovering. Was seen in hospital recently for high fevers and colitis. She is sleeping well. Does not use CPAP. Drinking plenty of water, increased amount recently but was not good about water intake before. Using a large water bottle to drink enough throughout the day. 1 protein shake per day. Eating small portions - sandwiches, grilled chicken, vegetables. No reflux or heart burn. No longer taking PPI. Taking MV BID, B12. Taking actigal BID. Exercising regularly - Pelaton 25 minute sessions. Reports fingerstick glucose levels have been below 153, generally 120's. Last weight 166lb at home. Pending appointments with GI and PCP.

## 2021-01-05 LAB
25(OH)D3 SERPL-MCNC: 27 NG/ML
ALBUMIN SERPL ELPH-MCNC: 4.6 G/DL
ALP BLD-CCNC: 55 U/L
ALT SERPL-CCNC: 13 U/L
ANION GAP SERPL CALC-SCNC: 12 MMOL/L
AST SERPL-CCNC: 15 U/L
BASOPHILS # BLD AUTO: 0.04 K/UL
BASOPHILS NFR BLD AUTO: 1 %
BILIRUB SERPL-MCNC: 0.3 MG/DL
BUN SERPL-MCNC: 18 MG/DL
CALCIUM SERPL-MCNC: 9.8 MG/DL
CHLORIDE SERPL-SCNC: 109 MMOL/L
CHOLEST SERPL-MCNC: 182 MG/DL
CO2 SERPL-SCNC: 24 MMOL/L
CREAT SERPL-MCNC: 0.7 MG/DL
EOSINOPHIL # BLD AUTO: 0.06 K/UL
EOSINOPHIL NFR BLD AUTO: 1.5 %
FERRITIN SERPL-MCNC: 82 NG/ML
FOLATE RBC-MCNC: 1316 NG/ML
GLUCOSE SERPL-MCNC: 123 MG/DL
HCT VFR BLD CALC: 39.6 %
HCT VFR BLD CALC: 40.8 %
HDLC SERPL-MCNC: 63 MG/DL
HGB BLD-MCNC: 12.5 G/DL
IMM GRANULOCYTES NFR BLD AUTO: 0.3 %
IRON SATN MFR SERPL: 21 %
IRON SERPL-MCNC: 79 UG/DL
LDLC SERPL CALC-MCNC: 101 MG/DL
LYMPHOCYTES # BLD AUTO: 1.61 K/UL
LYMPHOCYTES NFR BLD AUTO: 41.3 %
MAN DIFF?: NORMAL
MCHC RBC-ENTMCNC: 29.3 PG
MCHC RBC-ENTMCNC: 31.6 G/DL
MCV RBC AUTO: 92.7 FL
MONOCYTES # BLD AUTO: 0.22 K/UL
MONOCYTES NFR BLD AUTO: 5.6 %
NEUTROPHILS # BLD AUTO: 1.96 K/UL
NEUTROPHILS NFR BLD AUTO: 50.3 %
NONHDLC SERPL-MCNC: 119 MG/DL
PLATELET # BLD AUTO: 297 K/UL
POTASSIUM SERPL-SCNC: 4.6 MMOL/L
PROT SERPL-MCNC: 6.6 G/DL
RBC # BLD: 4.27 M/UL
RBC # FLD: 13.4 %
SODIUM SERPL-SCNC: 145 MMOL/L
TIBC SERPL-MCNC: 384 UG/DL
TRIGL SERPL-MCNC: 106 MG/DL
UIBC SERPL-MCNC: 305 UG/DL
VIT B1 SERPL-MCNC: 190.1 NMOL/L
VIT B12 SERPL-MCNC: 716 PG/ML
WBC # FLD AUTO: 3.9 K/UL

## 2021-01-06 ENCOUNTER — APPOINTMENT (OUTPATIENT)
Dept: OBGYN | Facility: CLINIC | Age: 51
End: 2021-01-06

## 2021-04-02 ENCOUNTER — APPOINTMENT (OUTPATIENT)
Dept: SURGERY | Facility: CLINIC | Age: 51
End: 2021-04-02
Payer: COMMERCIAL

## 2021-04-02 VITALS — WEIGHT: 165 LBS | HEIGHT: 63 IN | BODY MASS INDEX: 29.23 KG/M2

## 2021-04-02 DIAGNOSIS — E78.5 HYPERLIPIDEMIA, UNSPECIFIED: ICD-10-CM

## 2021-04-02 DIAGNOSIS — I10 ESSENTIAL (PRIMARY) HYPERTENSION: ICD-10-CM

## 2021-04-02 PROCEDURE — 99214 OFFICE O/P EST MOD 30 MIN: CPT | Mod: 95

## 2021-04-02 RX ORDER — DULAGLUTIDE 1.5 MG/.5ML
1.5 INJECTION, SOLUTION SUBCUTANEOUS
Refills: 0 | Status: DISCONTINUED | COMMUNITY
End: 2021-04-02

## 2021-04-02 RX ORDER — METFORMIN HYDROCHLORIDE 500 MG/1
500 TABLET, COATED ORAL DAILY
Refills: 0 | Status: DISCONTINUED | COMMUNITY
End: 2021-04-02

## 2021-04-02 RX ORDER — URSODIOL 300 MG/1
300 CAPSULE ORAL
Qty: 90 | Refills: 3 | Status: DISCONTINUED | COMMUNITY
Start: 2020-08-05 | End: 2021-04-02

## 2021-04-05 NOTE — ASSESSMENT
[FreeTextEntry1] : 50yo female with hypertension, QUINTON, DM, HLD, GERD, honeycutt's esophagus and morbid obesity s/p laparoscopic gastric bypass 6/29/2020.\par -encouraged to drink more water - goal of 64 oz per day\par -encouraged protein intake - goal 70g per day, drinking 1-2 protein shakes per day and high protein foods\par -encouraged to avoid snacking and choosing carbohydrate-rich foods\par -no longer taking PPI, no reflux symptoms\par -continue daily vitamins, B12, calcium\par -labs ordered\par -restart regular exercise - encouraged resistance training in addition to cardio\par -current medications reviewed\par -return to office for 9 month follow up\par -call with concerns

## 2021-04-05 NOTE — HISTORY OF PRESENT ILLNESS
[Procedure: ___] : Procedure performed: [unfilled]  [Date of Surgery: ___] : Date of Surgery:   [unfilled] [Surgeon Name:   ___] : Surgeon Name: Dr. MEJIA [___ Months Post Op] : [unfilled] months [de-identified] : 50yo female with PMHx of DM, HLD, HTN, GERD, anxiety, obesity with BMI 38.7 s/p laparoscopic gastric bypass 6/29/2020. States she feels great energy level. Sleeping poorly, taking sleep aid and Xanax PRN. Diet - notes that she has been eating certain bad foods. Coffee and protein bar for breakfast. Lunch is usually pizza and sandwiches. Stopped drinking protein shakes but wants to start again. Not drinking a lot of water. No reflux symptoms. No longer taking PPI. Taking MV BID, B12, vitamin D. Exercise - was using treadmill and Pelaton, suffered from knee injury and has been limiting activity for 1-2 weeks. Reports fingerstick glucose levels have been good, always around 110 range, last A1C was around 5. Last weight 165lb at home. No longer taking Trulicity nor Metformin. No longer taking Actigal.

## 2021-07-09 ENCOUNTER — APPOINTMENT (OUTPATIENT)
Dept: SURGERY | Facility: CLINIC | Age: 51
End: 2021-07-09

## 2021-07-19 ENCOUNTER — NON-APPOINTMENT (OUTPATIENT)
Age: 51
End: 2021-07-19

## 2022-01-10 NOTE — ASU PATIENT PROFILE, ADULT - MENTAL HEALTH CONDITIONS/SYMPTOMS, PROFILE
none Interpolation Flap Text: A decision was made to reconstruct the defect utilizing an interpolation axial flap and a staged reconstruction.  A telfa template was made of the defect.  This telfa template was then used to outline the interpolation flap.  The donor area for the pedicle flap was then injected with anesthesia.  The flap was excised through the skin and subcutaneous tissue down to the layer of the underlying musculature.  The interpolation flap was carefully excised within this deep plane to maintain its blood supply.  The edges of the donor site were undermined.   The donor site was closed in a primary fashion.  The pedicle was then rotated into position and sutured.  Once the tube was sutured into place, adequate blood supply was confirmed with blanching and refill.  The pedicle was then wrapped with xeroform gauze and dressed appropriately with a telfa and gauze bandage to ensure continued blood supply and protect the attached pedicle.

## 2022-12-30 ENCOUNTER — APPOINTMENT (OUTPATIENT)
Dept: NEUROLOGY | Facility: CLINIC | Age: 52
End: 2022-12-30
Payer: COMMERCIAL

## 2022-12-30 VITALS
BODY MASS INDEX: 31.54 KG/M2 | DIASTOLIC BLOOD PRESSURE: 78 MMHG | WEIGHT: 178 LBS | OXYGEN SATURATION: 97 % | HEIGHT: 63 IN | SYSTOLIC BLOOD PRESSURE: 135 MMHG | HEART RATE: 86 BPM | TEMPERATURE: 97.8 F

## 2022-12-30 DIAGNOSIS — R20.2 ANESTHESIA OF SKIN: ICD-10-CM

## 2022-12-30 DIAGNOSIS — R20.0 ANESTHESIA OF SKIN: ICD-10-CM

## 2022-12-30 DIAGNOSIS — G62.9 POLYNEUROPATHY, UNSPECIFIED: ICD-10-CM

## 2022-12-30 PROCEDURE — 99204 OFFICE O/P NEW MOD 45 MIN: CPT

## 2022-12-30 RX ORDER — GABAPENTIN 300 MG/1
300 CAPSULE ORAL
Qty: 30 | Refills: 3 | Status: ACTIVE | COMMUNITY
Start: 2022-12-30 | End: 1900-01-01

## 2022-12-30 NOTE — HISTORY OF PRESENT ILLNESS
[FreeTextEntry1] : Pt is a 51 yo F with PMHx of DM II, HTN, HLD, anxiety, QUINTON, GERD, s/p kaia-en-y gastrix bypass 2020, current tobacco use (0.5ppd), who presents with c/o bilateral arm numbness x 1 year. Notices it at night, tingling in her arms throughout the night. Sleeps on her stomach/side, with her arms up by her head. Occasional symptoms during the day if she has her arms bent/up for a period of time. Denies dropping things or difficulty with fine motor movement. denies leg/foot numbness. Endorses some balance issues as well over the last year, has fallen multiple times. mechanical falls, tripping over things. Denies b/b incontinence/retention. No family h/o MS, mother and maternal grandfather had RA. Takes vitamin b6 supplement.

## 2022-12-30 NOTE — PHYSICAL EXAM
[General Appearance - Alert] : alert [General Appearance - In No Acute Distress] : in no acute distress [General Appearance - Well Nourished] : well nourished [General Appearance - Well Developed] : well developed [Oriented To Time, Place, And Person] : oriented to person, place, and time [Affect] : the affect was normal [Person] : oriented to person [Place] : oriented to place [Time] : oriented to time [Naming Objects] : no difficulty naming common objects [Fluency] : fluency intact [Comprehension] : comprehension intact [Cranial Nerves Optic (II)] : visual acuity intact bilaterally,  visual fields full to confrontation, pupils equal round and reactive to light [Cranial Nerves Oculomotor (III)] : extraocular motion intact [Cranial Nerves Trigeminal (V)] : facial sensation intact symmetrically [Cranial Nerves Facial (VII)] : face symmetrical [Cranial Nerves Vestibulocochlear (VIII)] : hearing was intact bilaterally [Cranial Nerves Glossopharyngeal (IX)] : tongue and palate midline [Cranial Nerves Accessory (XI - Cranial And Spinal)] : head turning and shoulder shrug symmetric [Cranial Nerves Hypoglossal (XII)] : there was no tongue deviation with protrusion [Motor Tone] : muscle tone was normal in all four extremities [Motor Strength] : muscle strength was normal in all four extremities [Motor Handedness Right-Handed] : the patient is right hand dominant [Romberg's Sign] : Romberg's sign was negtive [Coordination - Dysmetria Impaired Finger-to-Nose Bilateral] : not present [2+] : Ankle jerk left 2+ [FreeTextEntry7] : decreased light touch and pin prick on dorsum of RUE, otherwise sensation intact [FreeTextEntry8] : Normal stance and stride, some difficulty with tandem gait [FreeTextEntry9] : questionable Wei present on the left [Respiration, Rhythm And Depth] : normal respiratory rhythm and effort [Heart Sounds] : normal S1 and S2 [Arterial Pulses Carotid] : carotid pulses were normal with no bruits

## 2022-12-30 NOTE — DISCUSSION/SUMMARY
[FreeTextEntry1] : Pt is a 53 yo F with PMHx of DM II, HTN, HLD, anxiety, QUINTON, GERD, s/p kaia-en-y gastrix bypass 2020, current tobacco use (0.5ppd), who presents with c/o bilateral arm numbness x 1 year.\par \par # Bilateral upper extremity paresthesia, RUE hypoesthesia: possible radial compression neuropathy vs cervical radiculopathy/myelopathy. \par - MRI cervical spine with/without to r/o sign of demyelinating lesion\par - Neuropathy labs\par - EMG/NCS\par - Start GBP 300mg QHS\par \par RTC in 3 mo

## 2023-01-10 ENCOUNTER — NON-APPOINTMENT (OUTPATIENT)
Age: 53
End: 2023-01-10

## 2023-04-29 ENCOUNTER — EMERGENCY (EMERGENCY)
Facility: HOSPITAL | Age: 53
LOS: 0 days | Discharge: HOME | End: 2023-04-29
Attending: EMERGENCY MEDICINE
Payer: COMMERCIAL

## 2023-04-29 VITALS
WEIGHT: 147.05 LBS | HEART RATE: 82 BPM | OXYGEN SATURATION: 98 % | RESPIRATION RATE: 18 BRPM | SYSTOLIC BLOOD PRESSURE: 132 MMHG | DIASTOLIC BLOOD PRESSURE: 66 MMHG | HEIGHT: 63 IN | TEMPERATURE: 99 F

## 2023-04-29 DIAGNOSIS — R11.2 NAUSEA WITH VOMITING, UNSPECIFIED: ICD-10-CM

## 2023-04-29 DIAGNOSIS — Z90.710 ACQUIRED ABSENCE OF BOTH CERVIX AND UTERUS: ICD-10-CM

## 2023-04-29 DIAGNOSIS — K21.9 GASTRO-ESOPHAGEAL REFLUX DISEASE WITHOUT ESOPHAGITIS: ICD-10-CM

## 2023-04-29 DIAGNOSIS — M54.9 DORSALGIA, UNSPECIFIED: ICD-10-CM

## 2023-04-29 DIAGNOSIS — Z87.19 PERSONAL HISTORY OF OTHER DISEASES OF THE DIGESTIVE SYSTEM: ICD-10-CM

## 2023-04-29 DIAGNOSIS — Z90.710 ACQUIRED ABSENCE OF BOTH CERVIX AND UTERUS: Chronic | ICD-10-CM

## 2023-04-29 DIAGNOSIS — Z79.84 LONG TERM (CURRENT) USE OF ORAL HYPOGLYCEMIC DRUGS: ICD-10-CM

## 2023-04-29 DIAGNOSIS — G47.33 OBSTRUCTIVE SLEEP APNEA (ADULT) (PEDIATRIC): ICD-10-CM

## 2023-04-29 DIAGNOSIS — R10.13 EPIGASTRIC PAIN: ICD-10-CM

## 2023-04-29 DIAGNOSIS — I10 ESSENTIAL (PRIMARY) HYPERTENSION: ICD-10-CM

## 2023-04-29 DIAGNOSIS — E11.9 TYPE 2 DIABETES MELLITUS WITHOUT COMPLICATIONS: ICD-10-CM

## 2023-04-29 DIAGNOSIS — Z98.890 OTHER SPECIFIED POSTPROCEDURAL STATES: Chronic | ICD-10-CM

## 2023-04-29 DIAGNOSIS — F17.210 NICOTINE DEPENDENCE, CIGARETTES, UNCOMPLICATED: ICD-10-CM

## 2023-04-29 DIAGNOSIS — E78.00 PURE HYPERCHOLESTEROLEMIA, UNSPECIFIED: ICD-10-CM

## 2023-04-29 DIAGNOSIS — Z90.10 ACQUIRED ABSENCE OF UNSPECIFIED BREAST AND NIPPLE: ICD-10-CM

## 2023-04-29 DIAGNOSIS — Z98.84 BARIATRIC SURGERY STATUS: ICD-10-CM

## 2023-04-29 LAB
ALBUMIN SERPL ELPH-MCNC: 4.8 G/DL — SIGNIFICANT CHANGE UP (ref 3.5–5.2)
ALP SERPL-CCNC: 42 U/L — SIGNIFICANT CHANGE UP (ref 30–115)
ALT FLD-CCNC: 22 U/L — SIGNIFICANT CHANGE UP (ref 0–41)
ANION GAP SERPL CALC-SCNC: 14 MMOL/L — SIGNIFICANT CHANGE UP (ref 7–14)
APPEARANCE UR: ABNORMAL
AST SERPL-CCNC: 34 U/L — SIGNIFICANT CHANGE UP (ref 0–41)
BACTERIA # UR AUTO: NEGATIVE — SIGNIFICANT CHANGE UP
BASOPHILS # BLD AUTO: 0.04 K/UL — SIGNIFICANT CHANGE UP (ref 0–0.2)
BASOPHILS NFR BLD AUTO: 0.5 % — SIGNIFICANT CHANGE UP (ref 0–1)
BILIRUB SERPL-MCNC: 0.3 MG/DL — SIGNIFICANT CHANGE UP (ref 0.2–1.2)
BILIRUB UR-MCNC: NEGATIVE — SIGNIFICANT CHANGE UP
BUN SERPL-MCNC: 17 MG/DL — SIGNIFICANT CHANGE UP (ref 10–20)
CALCIUM SERPL-MCNC: 10.3 MG/DL — SIGNIFICANT CHANGE UP (ref 8.4–10.5)
CHLORIDE SERPL-SCNC: 107 MMOL/L — SIGNIFICANT CHANGE UP (ref 98–110)
CO2 SERPL-SCNC: 22 MMOL/L — SIGNIFICANT CHANGE UP (ref 17–32)
COLOR SPEC: YELLOW — SIGNIFICANT CHANGE UP
CREAT SERPL-MCNC: 0.7 MG/DL — SIGNIFICANT CHANGE UP (ref 0.7–1.5)
DIFF PNL FLD: NEGATIVE — SIGNIFICANT CHANGE UP
EGFR: 105 ML/MIN/1.73M2 — SIGNIFICANT CHANGE UP
EOSINOPHIL # BLD AUTO: 0.11 K/UL — SIGNIFICANT CHANGE UP (ref 0–0.7)
EOSINOPHIL NFR BLD AUTO: 1.4 % — SIGNIFICANT CHANGE UP (ref 0–8)
EPI CELLS # UR: 3 /HPF — SIGNIFICANT CHANGE UP (ref 0–5)
GLUCOSE SERPL-MCNC: 97 MG/DL — SIGNIFICANT CHANGE UP (ref 70–99)
GLUCOSE UR QL: NEGATIVE — SIGNIFICANT CHANGE UP
HCT VFR BLD CALC: 39.2 % — SIGNIFICANT CHANGE UP (ref 37–47)
HGB BLD-MCNC: 13.2 G/DL — SIGNIFICANT CHANGE UP (ref 12–16)
HYALINE CASTS # UR AUTO: 2 /LPF — SIGNIFICANT CHANGE UP (ref 0–7)
IMM GRANULOCYTES NFR BLD AUTO: 0.4 % — HIGH (ref 0.1–0.3)
KETONES UR-MCNC: SIGNIFICANT CHANGE UP
LEUKOCYTE ESTERASE UR-ACNC: NEGATIVE — SIGNIFICANT CHANGE UP
LIDOCAIN IGE QN: 49 U/L — SIGNIFICANT CHANGE UP (ref 7–60)
LYMPHOCYTES # BLD AUTO: 1.68 K/UL — SIGNIFICANT CHANGE UP (ref 1.2–3.4)
LYMPHOCYTES # BLD AUTO: 21.9 % — SIGNIFICANT CHANGE UP (ref 20.5–51.1)
MCHC RBC-ENTMCNC: 30.4 PG — SIGNIFICANT CHANGE UP (ref 27–31)
MCHC RBC-ENTMCNC: 33.7 G/DL — SIGNIFICANT CHANGE UP (ref 32–37)
MCV RBC AUTO: 90.3 FL — SIGNIFICANT CHANGE UP (ref 81–99)
MONOCYTES # BLD AUTO: 0.47 K/UL — SIGNIFICANT CHANGE UP (ref 0.1–0.6)
MONOCYTES NFR BLD AUTO: 6.1 % — SIGNIFICANT CHANGE UP (ref 1.7–9.3)
NEUTROPHILS # BLD AUTO: 5.33 K/UL — SIGNIFICANT CHANGE UP (ref 1.4–6.5)
NEUTROPHILS NFR BLD AUTO: 69.7 % — SIGNIFICANT CHANGE UP (ref 42.2–75.2)
NITRITE UR-MCNC: NEGATIVE — SIGNIFICANT CHANGE UP
NRBC # BLD: 0 /100 WBCS — SIGNIFICANT CHANGE UP (ref 0–0)
PH UR: 8.5 — HIGH (ref 5–8)
PLATELET # BLD AUTO: 341 K/UL — SIGNIFICANT CHANGE UP (ref 130–400)
PMV BLD: 9.7 FL — SIGNIFICANT CHANGE UP (ref 7.4–10.4)
POTASSIUM SERPL-MCNC: 5 MMOL/L — SIGNIFICANT CHANGE UP (ref 3.5–5)
POTASSIUM SERPL-SCNC: 5 MMOL/L — SIGNIFICANT CHANGE UP (ref 3.5–5)
PROT SERPL-MCNC: 6.7 G/DL — SIGNIFICANT CHANGE UP (ref 6–8)
PROT UR-MCNC: ABNORMAL
RBC # BLD: 4.34 M/UL — SIGNIFICANT CHANGE UP (ref 4.2–5.4)
RBC # FLD: 12.7 % — SIGNIFICANT CHANGE UP (ref 11.5–14.5)
RBC CASTS # UR COMP ASSIST: 2 /HPF — SIGNIFICANT CHANGE UP (ref 0–4)
SODIUM SERPL-SCNC: 143 MMOL/L — SIGNIFICANT CHANGE UP (ref 135–146)
SP GR SPEC: 1.03 — SIGNIFICANT CHANGE UP (ref 1.01–1.03)
TROPONIN T SERPL-MCNC: <0.01 NG/ML — SIGNIFICANT CHANGE UP
UROBILINOGEN FLD QL: ABNORMAL
WBC # BLD: 7.66 K/UL — SIGNIFICANT CHANGE UP (ref 4.8–10.8)
WBC # FLD AUTO: 7.66 K/UL — SIGNIFICANT CHANGE UP (ref 4.8–10.8)
WBC UR QL: 4 /HPF — SIGNIFICANT CHANGE UP (ref 0–5)

## 2023-04-29 PROCEDURE — 93005 ELECTROCARDIOGRAM TRACING: CPT

## 2023-04-29 PROCEDURE — 99283 EMERGENCY DEPT VISIT LOW MDM: CPT

## 2023-04-29 PROCEDURE — 81001 URINALYSIS AUTO W/SCOPE: CPT

## 2023-04-29 PROCEDURE — 96375 TX/PRO/DX INJ NEW DRUG ADDON: CPT

## 2023-04-29 PROCEDURE — 36415 COLL VENOUS BLD VENIPUNCTURE: CPT

## 2023-04-29 PROCEDURE — 74177 CT ABD & PELVIS W/CONTRAST: CPT | Mod: 26,MA

## 2023-04-29 PROCEDURE — 74177 CT ABD & PELVIS W/CONTRAST: CPT | Mod: MA

## 2023-04-29 PROCEDURE — 99285 EMERGENCY DEPT VISIT HI MDM: CPT

## 2023-04-29 PROCEDURE — 99285 EMERGENCY DEPT VISIT HI MDM: CPT | Mod: 25

## 2023-04-29 PROCEDURE — 96374 THER/PROPH/DIAG INJ IV PUSH: CPT | Mod: XU

## 2023-04-29 PROCEDURE — 80053 COMPREHEN METABOLIC PANEL: CPT

## 2023-04-29 PROCEDURE — 87086 URINE CULTURE/COLONY COUNT: CPT

## 2023-04-29 PROCEDURE — 83690 ASSAY OF LIPASE: CPT

## 2023-04-29 PROCEDURE — 93010 ELECTROCARDIOGRAM REPORT: CPT

## 2023-04-29 PROCEDURE — 85025 COMPLETE CBC W/AUTO DIFF WBC: CPT

## 2023-04-29 PROCEDURE — 84484 ASSAY OF TROPONIN QUANT: CPT

## 2023-04-29 RX ORDER — ONDANSETRON 8 MG/1
4 TABLET, FILM COATED ORAL ONCE
Refills: 0 | Status: DISCONTINUED | OUTPATIENT
Start: 2023-04-29 | End: 2023-04-29

## 2023-04-29 RX ORDER — SUCRALFATE 1 G
10 TABLET ORAL
Qty: 420 | Refills: 0
Start: 2023-04-29 | End: 2023-05-12

## 2023-04-29 RX ORDER — MORPHINE SULFATE 50 MG/1
4 CAPSULE, EXTENDED RELEASE ORAL ONCE
Refills: 0 | Status: DISCONTINUED | OUTPATIENT
Start: 2023-04-29 | End: 2023-04-29

## 2023-04-29 RX ORDER — SODIUM CHLORIDE 9 MG/ML
1000 INJECTION, SOLUTION INTRAVENOUS ONCE
Refills: 0 | Status: COMPLETED | OUTPATIENT
Start: 2023-04-29 | End: 2023-04-29

## 2023-04-29 RX ORDER — DIATRIZOATE MEGLUMINE 180 MG/ML
30 INJECTION, SOLUTION INTRAVESICAL ONCE
Refills: 0 | Status: COMPLETED | OUTPATIENT
Start: 2023-04-29 | End: 2023-04-29

## 2023-04-29 RX ORDER — FAMOTIDINE 10 MG/ML
20 INJECTION INTRAVENOUS ONCE
Refills: 0 | Status: COMPLETED | OUTPATIENT
Start: 2023-04-29 | End: 2023-04-29

## 2023-04-29 RX ADMIN — MORPHINE SULFATE 4 MILLIGRAM(S): 50 CAPSULE, EXTENDED RELEASE ORAL at 12:26

## 2023-04-29 RX ADMIN — SODIUM CHLORIDE 1000 MILLILITER(S): 9 INJECTION, SOLUTION INTRAVENOUS at 12:26

## 2023-04-29 RX ADMIN — DIATRIZOATE MEGLUMINE 30 MILLILITER(S): 180 INJECTION, SOLUTION INTRAVESICAL at 15:07

## 2023-04-29 RX ADMIN — FAMOTIDINE 100 MILLIGRAM(S): 10 INJECTION INTRAVENOUS at 12:26

## 2023-04-29 RX ADMIN — DIATRIZOATE MEGLUMINE 30 MILLILITER(S): 180 INJECTION, SOLUTION INTRAVESICAL at 13:43

## 2023-04-29 NOTE — CONSULT NOTE ADULT - ASSESSMENT
ASSESSMENT:  53yF with PMHx of HTN, HLD, DM, Rojas's esophagus, hx of Derrick-en-Y gastric bypass (by Dr. Antunez June 2020), active smoker (~5-7 cigarettes/day), hx of hysterectomy (2015), daily NSAID use (Advil PM qhs), presented to ED for sudden onset, sharp epigastric pain radiating to the back earlier this morning, associated with nausea, three episodes of nbnb emesis, one episode of nb diarrhea. Last upper endoscopy within the past six months showed a few upper esophageal "nodules" that were benign, otherwise unremarkable.     Surgery consulted for epigastric pain in setting of bariatric surgery history. CT abdomen/pelvis with PO and IV contrast performed. Pending read. Abdomen soft, nondistended and nontender. Patient states abdominal pain has since subsided.     PLAN:  - Awaiting official CT scan read  - If negative, recommend PPI BID (pt already takes omeprazole once daily, may increase to q12h) and carafate.  - Patient educated on smoking cessation and NSAID use   - Follow up with Dr. Logan in 1-2 weeks as outpatient  - Recommend outpatient EGD to assess bypass     Above plan discussed with Attending Surgeon Dr. Logan, patient, patient family, and ED team  04-29-23 @ 17:23    CONSULT SPECTRA: [id number] ASSESSMENT:  53yF with PMHx of HTN, HLD, DM, Rojas's esophagus, hx of Derrick-en-Y gastric bypass (by Dr. Antunez June 2020), active smoker (~5-7 cigarettes/day), hx of hysterectomy (2015), daily NSAID use (Advil PM qhs), presented to ED for sudden onset, sharp epigastric pain radiating to the back earlier this morning, associated with intense burning, nausea, three episodes of nbnb emesis, one episode of nb diarrhea. Last upper endoscopy within the past six months showed a few upper esophageal "nodules" that were benign, otherwise unremarkable.     Surgery consulted for epigastric pain in setting of bariatric surgery history. CT abdomen/pelvis with PO and IV contrast performed. Pending read. Abdomen soft, nondistended and nontender. Patient states abdominal pain has since subsided.     PLAN:  - Awaiting official CT scan read  - If negative, recommend PPI BID (pt already takes omeprazole once daily, may increase to q12h) and carafate.  - Patient educated on smoking cessation and NSAID use   - Follow up with Dr. Logan in 1-2 weeks as outpatient  - Recommend outpatient EGD to assess bypass     Above plan discussed with Attending Surgeon Dr. Logan, patient, patient family, and ED team  04-29-23 @ 17:23    CONSULT SPECTRA: [id number] ASSESSMENT:  53yF with PMHx of HTN, HLD, DM, Rojas's esophagus, hx of Derrick-en-Y gastric bypass (by Dr. Antunez June 2020), active smoker (~5-7 cigarettes/day), hx of hysterectomy (2015), daily NSAID use (Advil PM qhs), presented to ED for sudden onset, sharp epigastric pain radiating to the back earlier this morning, associated with intense burning, nausea, three episodes of nbnb emesis, one episode of nb diarrhea. Last upper endoscopy within the past six months showed a few upper esophageal "nodules" that were benign, otherwise unremarkable.     Surgery consulted for epigastric pain in setting of bariatric surgery history. CT abdomen/pelvis with PO and IV contrast performed. No acute intraabdominal pathology. Abdomen soft, nondistended and nontender. Patient states abdominal pain has since subsided.     PLAN:  - CT scan negative, recommend PPI BID (pt already takes omeprazole once daily, may increase to q12h) and Carafate.  - Patient educated on smoking cessation and NSAID use   - Follow up with Dr. Logan in 1-2 weeks as outpatient  - Recommend outpatient EGD to assess bypass     Above plan discussed with Attending Surgeon Dr. Logan, patient, patient family, and ED team  04-29-23 @ 17:23    CONSULT SPECTRA: [id number] ASSESSMENT:  53yF with PMHx of HTN, HLD, DM, Rojas's esophagus, hx of Derrick-en-Y gastric bypass (by Dr. Antunez June 2020), active smoker (~5-7 cigarettes/day), hx of hysterectomy (2015), daily NSAID use (Advil PM qhs), presented to ED for sudden onset, sharp epigastric pain radiating to the back earlier this morning, associated with intense burning, nausea, three episodes of nbnb emesis, one episode of nb diarrhea. Last upper endoscopy within the past six months showed a few upper esophageal "nodules" that were benign, otherwise unremarkable.     Surgery consulted for epigastric pain in setting of bariatric surgery history. CT abdomen/pelvis with PO and IV contrast performed. No acute intraabdominal pathology. Abdomen soft, nondistended and nontender. Patient states abdominal pain has since subsided.     PLAN:  - CT scan negative, recommend PPI BID (pt already takes omeprazole once daily, may increase to q12h) and Carafate.  - Patient educated on smoking cessation and told to stop taking NSAIDs. Patient understands risks.   - Follow up with Dr. Logan in 1-2 weeks as outpatient  - Recommend outpatient EGD to assess bypass     Above plan discussed with Attending Surgeon Dr. Logan, patient, patient family, and ED team  04-29-23 @ 17:23    CONSULT SPECTRA: [id number]

## 2023-04-29 NOTE — ED PROVIDER NOTE - OBJECTIVE STATEMENT
53-year-old female with past medical history of hypertension hyperlipidemia Rojas's esophagus on omeprazole follows Dr. Parmer for GI diabetes recently started on Mounjaro 5 months ago gastric bypass Derrick-en-Y 2020 Dr. Carolyn Wilkinson presenting for sudden onset of sharp/burning epigastric abdominal pain radiating to the back getting progressively worse associated with nausea and 3 episodes of nonbilious nonbloody vomiting that started around 8:30 AM.  Patient states this was after she went out to breakfast and states she only had a few bites of food and a few sips of orange juice which she does not usually drink.  Patient denies associated dysuria hematuria melena hematochezia diarrhea chest pain shortness of breath fever cough congestion sore throat recent travel or sick contacts.  Patient denies drug or alcohol use.  Current smoker.

## 2023-04-29 NOTE — ED PROVIDER NOTE - PATIENT PORTAL LINK FT
You can access the FollowMyHealth Patient Portal offered by [facility] by registering at the following website: [email or web address]. By joining [employer]’s FollowMyHealth portal, you will also be able to view your health information using other applications (apps) compatible with our system.

## 2023-04-29 NOTE — ED PROVIDER NOTE - CARE PROVIDER_API CALL
Cady Logan)  Surgery  [address] [place] [address]  Phone: [phone]  Fax: [phone]  Follow Up Time: 7-10 Days    Jodi Chanel  GASTROENTEROLOGY  [address] [place] [address]  Phone: [phone]  Fax: [phone]  Follow Up Time: 1-3 Days

## 2023-04-29 NOTE — ED ADULT TRIAGE NOTE - CHIEF COMPLAINT QUOTE
Patient BIBA c/o abdominal pain radiating to back associated with nausea and vomiting. Patient reports gastric bypass 3 years ago.

## 2023-04-29 NOTE — ED ADULT NURSE NOTE - OBJECTIVE STATEMENT
Patient presents to ED for c/o abdominal pain radiating to back associated with nausea and vomiting. Patient reports gastric bypass 3 years ago. Patient c/o 7/0 abdominal pain. Pain medication administered as per orders. A&O x3.

## 2023-04-29 NOTE — ED PROVIDER NOTE - PHYSICAL EXAMINATION
CONSTITUTIONAL: well-appearing, in NAD  SKIN: Warm dry, normal skin turgor  HEAD: NCAT  EYES: EOMI, PERRLA, no scleral icterus, conjunctiva pink  ENT: normal pharynx with no erythema or exudates  NECK: Supple; non tender. Full ROM.  CARD: RRR, no murmurs.  RESP: clear to ausculation b/l. No crackles or wheezing.  ABD: soft, tender to epigastrium and RLQ, non-distended, no rebound or guarding. no CVA tenderness  EXT: Full ROM, no bony tenderness, no pedal edema, no calf tenderness  NEURO: normal motor. normal sensory. Normal gait.  PSYCH: Cooperative, appropriate.

## 2023-04-29 NOTE — ED PROVIDER NOTE - PROVIDER TOKENS
PROVIDER:[TOKEN:[[id number]],FOLLOWUP:[7-10 Days]],PROVIDER:[TOKEN:[[id number]],FOLLOWUP:[1-3 Days]]

## 2023-04-29 NOTE — ED PROVIDER NOTE - PROGRESS NOTE DETAILS
BK: MIS surgery consulted. BK: Patient reassessed and resting comfortably. Results explained and understands need for follow up with surgery clinic and Dr. Chanel for outpatient EGD. Strict return precautions given.

## 2023-04-29 NOTE — ED PROVIDER NOTE - CLINICAL SUMMARY MEDICAL DECISION MAKING FREE TEXT BOX
Labs and EKG were ordered and reviewed.  Imaging was ordered and reviewed by me.  Appropriate medications for patient's presenting complaints were ordered and effects were reassessed.  Patient's records (prior hospital, ED visit, and/or nursing home notes if available) were reviewed.  Additional history was obtained from EMS, family, and/or PCP (where available).  Escalation to admission/observation was considered.  Patient had normal imaging and well appearing.  Cleared by surgery for close outpatient follow up.  as per surgery:    54 yo female 3 years s/p gastric bypass 3 years ago. She started having epigastric and back pain this morning, along with nausea and emesis.   She does smoke daily and takes at least 600mg NSAIDs everynight.   Her vitals and labs are normal.   On exam, after being in the ED a few hours she is now nontender and her pain is improved.   CT scan normal.   Suspect marginal ulcer due to risk factors of smoking, nsaids. She feels better and agreeable to go home. She will be discharged on bid ppi and carafate. She will follow up with me in two weeks. I had a long discussion with her about the risks of continuing to smoke and take nsaids and her and her  understood the importance of quitting.  She was cleared for discharge.

## 2023-04-29 NOTE — CONSULT NOTE ADULT - SUBJECTIVE AND OBJECTIVE BOX
GENERAL SURGERY CONSULT NOTE    Patient: STEVE PIERRE , 53y (74)Female   MRN: [id number]  Location: [facility] ED  Visit: 23 Emergency  Date: 23 @ 17:23    HPI: 53yF with PMHx of HTN, HLD, DM, Rojas's esophagus, hx of Derrick-en-Y gastric bypass (by Dr. Antunez 2020), active smoker (~5-7 cigarettes/day), hx of hysterectomy (), daily NSAID use (Advil PM qhs), presented to ED for sudden onset, sharp epigastric pain earlier this morning. Patient states her and  went to the diner this morning and she started to "feel weird" prior to the diner, however after she had a sip of orange juice and a few small bites of her food, she experienced sharp, sudden onset of epigastric pain radiating to the back associated with nausea and a hot flash sensation. Patient states when she got home she had an episode of nb diarrhea and three episodes of nbnb vomiting. Patient states she never felt anything like this before. Patients last bowel movement was this morning. Patient states she had an upper endoscopy within the past six months that showed a few upper esophageal "nodules" that were benign, otherwise unremarkable.     Surgery consulted for epigastric pain in setting of bariatric surgery history. CT abdomen/pelvis with PO and IV contrast performed. Pending read.       PAST MEDICAL & SURGICAL HISTORY:  HTN (hypertension)  DM (diabetes mellitus)  High cholesterol  QUINTON (obstructive sleep apnea) oral device  Obese  GERD (gastroesophageal reflux disease)  Active Smoker (5 cigarettes per day)   NSAID use   Anxiety  Barretts syndrome  S/P hysterectomy,   H/O lumpectomy      Home Medications:  acetaminophen 325 mg oral tablet: 2 tab(s) orally every 6 hours, As needed, Temp greater or equal to 38C (100.4F), Mild Pain (1 - 3), Moderate Pain (4 - 6) (2020 09:28)  ALPRAZolam 0.5 mg oral tablet: 0.5 milligram(s) orally 2 times a day (2020 02:24)  atorvastatin 20 mg oral tablet: 1 tab(s) orally once a day (2020 02:24)  Invokana 300 mg oral tablet: 1 tab(s) orally once a day (2020 02:24)  losartan 100 mg oral tablet: 1 tab(s) orally once a day (2020 02:24)  omeprazole 40 mg oral delayed release capsule: 1 cap(s) orally once a day (2020 02:24)  pioglitazone 30 mg oral tablet: 1 tab(s) orally once a day (2020 02:24)  ursodiol 300 mg oral tablet: 300 milligram(s) orally 2 times a day (2020 02:24)      VITALS:  T(F): 98.6 (23 @ 11:38), Max: 98.6 (23 @ 11:38)  HR: 82 (23 @ 11:38) (82 - 82)  BP: 132/66 (23 @ 11:38) (132/66 - 132/66)  RR: 18 (23 @ 11:38) (18 - 18)  SpO2: 98% (23 @ 11:38) (98% - 98%)    PHYSICAL EXAM:  General: NAD, AAOx3, calm and cooperative  Cardiac: RRR S1, S2  Respiratory: CTAB, normal respiratory effort  Abdomen: Hysterectomy scar. Soft, non-distended, non-tender, no rebound, no guarding.  Vascular: Pulses 2+ throughout, extremities well perfused  Skin: Warm/dry, normal color, no jaundice      MEDICATIONS  (STANDING):  ondansetron Injectable 4 milliGRAM(s) IV Push once      LAB/STUDIES:                        13.2   7.66  )-----------( 341      ( 2023 12:38 )             39.2         143  |  107  |  17  ----------------------------<  97  5.0   |  22  |  0.7    Ca    10.3      2023 12:38    TPro  6.7  /  Alb  4.8  /  TBili  0.3  /  DBili  x   /  AST  34  /  ALT  22  /  AlkPhos  42        LIVER FUNCTIONS - ( 2023 12:38 )  Alb: 4.8 g/dL / Pro: 6.7 g/dL / ALK PHOS: 42 U/L / ALT: 22 U/L / AST: 34 U/L / GGT: x           Urinalysis Basic - ( 2023 13:18 )    Color: Yellow / Appearance: Turbid / S.030 / pH: x  Gluc: x / Ketone: Trace  / Bili: Negative / Urobili: 3 mg/dL   Blood: x / Protein: 100 mg/dL / Nitrite: Negative   Leuk Esterase: Negative / RBC: 2 /HPF / WBC 4 /HPF   Sq Epi: x / Non Sq Epi: x / Bacteria: Negative      CARDIAC MARKERS ( 2023 12:38 )  x     / <0.01 ng/mL / x     / x     / x          IMAGING:  Pending read    ACCESS DEVICES:  [x] Peripheral IV GENERAL SURGERY CONSULT NOTE    Patient: STEVE PIERRE , 53y (74)Female   MRN: [id number]  Location: [facility] ED  Visit: 23 Emergency  Date: 23 @ 17:23    HPI: 53yF with PMHx of HTN, HLD, DM, Rojas's esophagus, hx of Derrick-en-Y gastric bypass (by Dr. Antunez 2020), active smoker (~5-7 cigarettes/day), hx of hysterectomy (), daily NSAID use (Advil PM qhs), presented to ED for sudden onset, sharp burning epigastric pain earlier this morning. Patient states her and  went to the diner this morning and she started to "feel weird" prior to the diner, however after she had a sip of orange juice and a few small bites of her food, she experienced sharp, sudden onset of epigastric pain radiating to the back associated with nausea and a hot flash sensation. Patient states when she got home she had an episode of nb diarrhea and three episodes of nbnb vomiting. Patient states she never felt anything like this before. Patients last bowel movement was this morning. Patient states she had an upper endoscopy within the past six months that showed a few upper esophageal "nodules" that were benign, otherwise unremarkable.     Surgery consulted for epigastric pain in setting of bariatric surgery history. CT abdomen/pelvis with PO and IV contrast performed. Pending read.       PAST MEDICAL & SURGICAL HISTORY:  HTN (hypertension)  DM (diabetes mellitus)  High cholesterol  QUINTON (obstructive sleep apnea) oral device  Obese  GERD (gastroesophageal reflux disease)  Active Smoker (5 cigarettes per day)   NSAID use   Anxiety  Barretts syndrome  S/P hysterectomy,   H/O lumpectomy      Home Medications:  acetaminophen 325 mg oral tablet: 2 tab(s) orally every 6 hours, As needed, Temp greater or equal to 38C (100.4F), Mild Pain (1 - 3), Moderate Pain (4 - 6) (2020 09:28)  ALPRAZolam 0.5 mg oral tablet: 0.5 milligram(s) orally 2 times a day (2020 02:24)  atorvastatin 20 mg oral tablet: 1 tab(s) orally once a day (2020 02:24)  Invokana 300 mg oral tablet: 1 tab(s) orally once a day (2020 02:24)  losartan 100 mg oral tablet: 1 tab(s) orally once a day (2020 02:24)  omeprazole 40 mg oral delayed release capsule: 1 cap(s) orally once a day (:24)  pioglitazone 30 mg oral tablet: 1 tab(s) orally once a day (:24)  ursodiol 300 mg oral tablet: 300 milligram(s) orally 2 times a day (:)      VITALS:  T(F): 98.6 (23 @ 11:38), Max: 98.6 (23 @ 11:38)  HR: 82 (23 @ 11:38) (82 - 82)  BP: 132/66 (23 @ 11:38) (132/66 - 132/66)  RR: 18 (23 @ 11:38) (18 - 18)  SpO2: 98% (23 @ 11:38) (98% - 98%)    PHYSICAL EXAM:  General: NAD, AAOx3, calm and cooperative  Cardiac: RRR S1, S2  Respiratory: CTAB, normal respiratory effort  Abdomen: Hysterectomy scar. Soft, non-distended, non-tender, no rebound, no guarding.  Vascular: Pulses 2+ throughout, extremities well perfused  Skin: Warm/dry, normal color, no jaundice      MEDICATIONS  (STANDING):  ondansetron Injectable 4 milliGRAM(s) IV Push once      LAB/STUDIES:                        13.2   7.66  )-----------( 341      ( 2023 12:38 )             39.2         143  |  107  |  17  ----------------------------<  97  5.0   |  22  |  0.7    Ca    10.3      2023 12:38    TPro  6.7  /  Alb  4.8  /  TBili  0.3  /  DBili  x   /  AST  34  /  ALT  22  /  AlkPhos  42        LIVER FUNCTIONS - ( 2023 12:38 )  Alb: 4.8 g/dL / Pro: 6.7 g/dL / ALK PHOS: 42 U/L / ALT: 22 U/L / AST: 34 U/L / GGT: x           Urinalysis Basic - ( 2023 13:18 )    Color: Yellow / Appearance: Turbid / S.030 / pH: x  Gluc: x / Ketone: Trace  / Bili: Negative / Urobili: 3 mg/dL   Blood: x / Protein: 100 mg/dL / Nitrite: Negative   Leuk Esterase: Negative / RBC: 2 /HPF / WBC 4 /HPF   Sq Epi: x / Non Sq Epi: x / Bacteria: Negative      CARDIAC MARKERS ( 2023 12:38 )  x     / <0.01 ng/mL / x     / x     / x          IMAGING:  < from: CT Abdomen and Pelvis w/ Oral Cont and w/ IV Cont (23 @ 17:06) >  PERITONEUM/MESENTERY/BOWEL: Post gastric bypass surgery. Oral contrast   visualized within the alimentary limb, no contrast seen within the   biliopancreatic limb. No bowel obstruction, intra-abdominal free air, or   ascites. Normal appendix.    < from: CT Abdomen and Pelvis w/ Oral Cont and w/ IV Cont (23 @ 17:06) >  IMPRESSION:  No CT evidence of acute abdominopelvic pathology.        ACCESS DEVICES:  [x] Peripheral IV

## 2023-04-29 NOTE — ED PROVIDER NOTE - NSFOLLOWUPCLINICS_GEN_ALL_ED_FT
[facility]  Surgery  [address] [place] [address]  Phone: [phone]  Fax:   Follow Up Time: 1-3 Days

## 2023-04-29 NOTE — CONSULT NOTE ADULT - ATTENDING COMMENTS
52 yo female 3 years s/p gastric bypass 3 years ago. She started having epigastric and back pain this morning, along with nausea and emesis.   She does smoke daily and takes at least 600mg NSAIDs everynight.   Her vitals and labs are normal.   On exam, after being in the ED a few hours she is now nontender and her pain is improved.     CT scan normal.     Suspect marginal ulcer due to risk factors of smoking, nsaids. She feels better and agreeable to go home. She will be discharged on bid ppi and carafate. She will follow up with me in two weeks.   I had a long discussion with her about the risks of continuing to smoke and take nsaids and her and her  understood the importance of quitting.

## 2023-04-30 LAB
CULTURE RESULTS: SIGNIFICANT CHANGE UP
SPECIMEN SOURCE: SIGNIFICANT CHANGE UP

## 2023-05-17 NOTE — ED ADULT NURSE NOTE - HOW OFTEN DO YOU HAVE A DRINK CONTAINING ALCOHOL?
Problem: Non-Pressure Injury Wound  Goal: # No deterioration in wound  Outcome: Outcome Not Met, Plan Adjusted   Therapy plan changed today. Unused portion of 1.5oz Silvasorb given to daughter for use at home. Patient arrived in wheelchair with non-functioning brakes. She was not able to bear her own weight on her legs at this time. Two RN's and gait belt used for transfer to and from exam chair, with daughter manually stabilizing wheelchair. Call placed to Merline Cespedes at [facility] [phone] to discuss unsafe condition of wheelchair. Follow up with provider scheduled in 4 weeks. AVS given.  
Never

## 2023-07-05 NOTE — ED ADULT NURSE NOTE - CAS DISCH ACCOMP BY
Self Graft Donor Site Bandage (Optional-Leave Blank If You Don't Want In Note): Steri-strips and a pressure bandage were applied to the donor site.

## 2023-07-11 ENCOUNTER — APPOINTMENT (OUTPATIENT)
Dept: OBGYN | Facility: CLINIC | Age: 53
End: 2023-07-11
Payer: COMMERCIAL

## 2023-07-11 VITALS
HEIGHT: 63 IN | DIASTOLIC BLOOD PRESSURE: 72 MMHG | BODY MASS INDEX: 25.34 KG/M2 | SYSTOLIC BLOOD PRESSURE: 116 MMHG | WEIGHT: 143 LBS | HEART RATE: 85 BPM | TEMPERATURE: 98.6 F

## 2023-07-11 DIAGNOSIS — Z86.018 PERSONAL HISTORY OF OTHER BENIGN NEOPLASM: ICD-10-CM

## 2023-07-11 DIAGNOSIS — F41.9 ANXIETY DISORDER, UNSPECIFIED: ICD-10-CM

## 2023-07-11 DIAGNOSIS — Z12.4 ENCOUNTER FOR SCREENING FOR MALIGNANT NEOPLASM OF CERVIX: ICD-10-CM

## 2023-07-11 DIAGNOSIS — Z01.419 ENCOUNTER FOR GYNECOLOGICAL EXAMINATION (GENERAL) (ROUTINE) W/OUT ABNORMAL FINDINGS: ICD-10-CM

## 2023-07-11 DIAGNOSIS — Z82.0 FAMILY HISTORY OF EPILEPSY AND OTHER DISEASES OF THE NERVOUS SYSTEM: ICD-10-CM

## 2023-07-11 DIAGNOSIS — Z80.1 FAMILY HISTORY OF MALIGNANT NEOPLASM OF TRACHEA, BRONCHUS AND LUNG: ICD-10-CM

## 2023-07-11 DIAGNOSIS — Z78.9 OTHER SPECIFIED HEALTH STATUS: ICD-10-CM

## 2023-07-11 DIAGNOSIS — F17.200 NICOTINE DEPENDENCE, UNSPECIFIED, UNCOMPLICATED: ICD-10-CM

## 2023-07-11 DIAGNOSIS — Z86.010 PERSONAL HISTORY OF COLONIC POLYPS: ICD-10-CM

## 2023-07-11 DIAGNOSIS — Z86.39 PERSONAL HISTORY OF OTHER ENDOCRINE, NUTRITIONAL AND METABOLIC DISEASE: ICD-10-CM

## 2023-07-11 DIAGNOSIS — Z87.19 PERSONAL HISTORY OF OTHER DISEASES OF THE DIGESTIVE SYSTEM: ICD-10-CM

## 2023-07-11 DIAGNOSIS — Z87.42 PERSONAL HISTORY OF OTHER DISEASES OF THE FEMALE GENITAL TRACT: ICD-10-CM

## 2023-07-11 DIAGNOSIS — Z86.79 PERSONAL HISTORY OF OTHER DISEASES OF THE CIRCULATORY SYSTEM: ICD-10-CM

## 2023-07-11 DIAGNOSIS — E11.9 TYPE 2 DIABETES MELLITUS W/OUT COMPLICATIONS: ICD-10-CM

## 2023-07-11 DIAGNOSIS — Z82.49 FAMILY HISTORY OF ISCHEMIC HEART DISEASE AND OTHER DISEASES OF THE CIRCULATORY SYSTEM: ICD-10-CM

## 2023-07-11 PROCEDURE — 99386 PREV VISIT NEW AGE 40-64: CPT

## 2023-07-11 PROCEDURE — 99213 OFFICE O/P EST LOW 20 MIN: CPT | Mod: 25

## 2023-07-11 RX ORDER — OSPEMIFENE 60 MG/1
60 TABLET, FILM COATED ORAL
Qty: 30 | Refills: 3 | Status: ACTIVE | COMMUNITY
Start: 2023-07-11 | End: 1900-01-01

## 2023-07-12 PROBLEM — Z87.19 HISTORY OF BARRETT'S ESOPHAGUS: Status: RESOLVED | Noted: 2023-07-12 | Resolved: 2023-07-12

## 2023-07-12 PROBLEM — Z86.018 HISTORY OF FIBROID: Status: RESOLVED | Noted: 2023-07-12 | Resolved: 2023-07-12

## 2023-07-12 PROBLEM — Z78.9 DOES NOT USE ILLICIT DRUGS: Status: ACTIVE | Noted: 2023-07-12

## 2023-07-12 PROBLEM — F41.9 ANXIETY: Status: ACTIVE | Noted: 2019-06-20

## 2023-07-12 PROBLEM — Z82.49 FAMILY HISTORY OF HYPERTENSION: Status: ACTIVE | Noted: 2023-07-11

## 2023-07-12 PROBLEM — Z86.010 HISTORY OF COLONIC POLYPS: Status: RESOLVED | Noted: 2023-07-11 | Resolved: 2023-07-12

## 2023-07-12 PROBLEM — Z86.79 HISTORY OF HYPERTENSION: Status: RESOLVED | Noted: 2023-07-12 | Resolved: 2023-07-12

## 2023-07-12 PROBLEM — E11.9 DIABETES MELLITUS: Status: ACTIVE | Noted: 2019-06-20

## 2023-07-12 PROBLEM — Z87.42 HISTORY OF ENDOMETRIOSIS: Status: RESOLVED | Noted: 2023-07-12 | Resolved: 2023-07-12

## 2023-07-12 PROBLEM — Z86.39 HISTORY OF HIGH CHOLESTEROL: Status: RESOLVED | Noted: 2023-07-12 | Resolved: 2023-07-12

## 2023-07-12 PROBLEM — Z78.9 EXERCISES OCCASIONALLY: Status: ACTIVE | Noted: 2023-07-11

## 2023-07-12 PROBLEM — Z82.0 FAMILY HISTORY OF ALZHEIMER'S DISEASE: Status: ACTIVE | Noted: 2023-07-12

## 2023-07-12 PROBLEM — F17.200 CURRENT EVERY DAY SMOKER: Status: ACTIVE | Noted: 2023-07-12

## 2023-07-12 PROBLEM — Z80.1 FAMILY HISTORY OF LUNG CANCER: Status: ACTIVE | Noted: 2023-07-12

## 2023-07-12 PROBLEM — Z87.19 HISTORY OF GASTROESOPHAGEAL REFLUX (GERD): Status: RESOLVED | Noted: 2023-07-12 | Resolved: 2023-07-12

## 2023-07-12 RX ORDER — DULOXETINE HYDROCHLORIDE 60 MG/1
60 CAPSULE, DELAYED RELEASE PELLETS ORAL
Refills: 0 | Status: ACTIVE | COMMUNITY

## 2023-07-12 RX ORDER — TIRZEPATIDE 5 MG/.5ML
5 INJECTION, SOLUTION SUBCUTANEOUS
Refills: 0 | Status: ACTIVE | COMMUNITY

## 2023-07-12 RX ORDER — BUSPIRONE HYDROCHLORIDE 30 MG/1
30 TABLET ORAL
Refills: 0 | Status: ACTIVE | COMMUNITY

## 2023-07-12 RX ORDER — SUCRALFATE 1 G/1
1 TABLET ORAL
Refills: 0 | Status: ACTIVE | COMMUNITY

## 2023-07-12 RX ORDER — MULTIVIT-MIN/IRON/FOLIC ACID/K 18-600-40
CAPSULE ORAL
Refills: 0 | Status: ACTIVE | COMMUNITY

## 2023-07-12 RX ORDER — OMEPRAZOLE 40 MG/1
40 CAPSULE, DELAYED RELEASE ORAL
Refills: 0 | Status: ACTIVE | COMMUNITY

## 2023-07-13 NOTE — PHYSICAL EXAM
Scheduled pt for L5 S1 LESI 6/30/21  Pt denies rx blood thinners  Went over pre-procedure instructions below:  Nothing to eat or drink 1 hr prior to procedure  Need to arrange transportation  Proper clothing for procedure  If ill or placed on antibiotics please call to reschedule  Covid/travel/ and vaccine instructions [Appropriately responsive] : appropriately responsive [Alert] : alert [No Acute Distress] : no acute distress [No Lymphadenopathy] : no lymphadenopathy [Regular Rate Rhythm] : regular rate rhythm [No Murmurs] : no murmurs [Clear to Auscultation B/L] : clear to auscultation bilaterally [Soft] : soft [Non-tender] : non-tender [Non-distended] : non-distended [No HSM] : No HSM [No Lesions] : no lesions [No Mass] : no mass [Oriented x3] : oriented x3 [Examination Of The Breasts] : a normal appearance [No Discharge] : no discharge [No Masses] : no breast masses were palpable [Labia Majora] : normal [Labia Minora] : normal [Normal] : normal [FreeTextEntry6] : Absent

## 2023-07-13 NOTE — HISTORY OF PRESENT ILLNESS
[Patient reported mammogram was normal] : Patient reported mammogram was normal [Patient reported bone density results were normal] : Patient reported bone density results were normal [Patient reported colonoscopy was normal] : Patient reported colonoscopy was normal [LMP unknown] : LMP unknown [postmenopausal] : postmenopausal [N] : Patient does not use contraception [Y] : Positive pregnancy history [unknown] : Patient is unsure of the date of her LMP [Menarche Age: ____] : age at menarche was [unfilled] [Currently In Menopause] : currently in menopause [Menopause Age: ____] : age at menopause was [unfilled] [Currently Active] : currently active [Men] : men [No] : No [Patient reported PAP Smear was normal] : Patient reported PAP Smear was normal [Gonorrhea test offered] : Gonorrhea test offered [Chlamydia test offered] : Chlamydia test offered [Trichomonas test offered] : Trichomonas test offered [HPV test offered] : HPV test offered [TextBox_4] : The patient is a 53 year old para 3003 in menopause presenting for her annual GYN exam and pap smear. She denies postmenopausal bleeding, pelvic pain, vaginal discharge, and dysuria. She does complain of painful intercourse due to vaginal dryness and menopausal symptoms. She underwent a supracervical hysterectomy BSO for fibroids and endometriosis at a young age of 45. She states that dyspareunia is affecting her quality of life. The patient wants to discuss options of management. She denies a history of abnormal pap smears. HPV, and STD. She is sexually active with one male partner - .\par The patient was counseled on all options for menopausal symptoms and dyspareunia due to vulva vaginal atrophy. We discussed conservative management with topical lubricants, hormonal and non-hormonal medical management, and procedural management with last therapy. The patient understood all counseling and all questions satisfactory. She understands the risks and benefits of all of the option and states that she would like to try Osphena. She understands that there is an increased risk of uterine cancer; however, she has had a hysterectomy and would prefer this method. She understands the potential risk of cardiac disease and VTE. We'll try medication for threes 3 months and follow-up at that time. [Mammogramdate] : 2021 [TextBox_19] : will give referral [BoneDensityDate] : 2019 [TextBox_37] : will give referral due to early menopause [ColonoscopyDate] : 2022 [TextBox_43] : f/u 3 yrs  [PGHxTotal] : 3 [[facility]] : 3 [PGHxPremature] : 0 [PGHxAbortions] : 0 [[facility] Centeriving] : 3 [FreeTextEntry1] :

## 2023-07-13 NOTE — END OF VISIT
[FreeTextEntry3] : By signing my name below, I, Escobar Orozco, attest that this documentation has been prepared under the direction and in the presence of Joselin Muro MD.\par \par I, Joselin Muro MD, personally performed the services described in this documentation. All medical record entries made by the scribe were at my direction and in my presence. I have reviewed the chart and discharge instructions (if applicable) and agree that the record reflects my personal performance and is accurate and complete.

## 2023-07-13 NOTE — DISCUSSION/SUMMARY
[FreeTextEntry1] : Assessment: 53 year old with vulva vagina atrophy, dyspareunia, early menopause, and a BMI of 25.\par \par Plan: GYN exam done.\par Pap smear done. \par Safe sex practices.\par Pain and bleeding precautions.\par Hormonal replacement therapy and counseling for dyspareunia due to vulva vagina atrophy counseling done - Patient would like to try Osphena - risks, benefits, and alternative discussed.\par Osphena prescription given.\par Referral for mammogram given.\par Referral for DEXA given.\par Encouraged healthy diet and exercise.\par Follow-up in three months for medication management evaluation or PRN.

## 2023-07-14 ENCOUNTER — NON-APPOINTMENT (OUTPATIENT)
Age: 53
End: 2023-07-14

## 2023-07-14 LAB
C TRACH RRNA SPEC QL NAA+PROBE: NOT DETECTED
CYTOLOGY CVX/VAG DOC THIN PREP: NORMAL
HPV HIGH+LOW RISK DNA PNL CVX: NOT DETECTED
N GONORRHOEA RRNA SPEC QL NAA+PROBE: NOT DETECTED
SOURCE AMPLIFICATION: NORMAL
SOURCE AMPLIFICATION: NORMAL
T VAGINALIS RRNA SPEC QL NAA+PROBE: NOT DETECTED

## 2023-10-11 ENCOUNTER — APPOINTMENT (OUTPATIENT)
Dept: OBGYN | Facility: CLINIC | Age: 53
End: 2023-10-11
Payer: COMMERCIAL

## 2023-10-11 DIAGNOSIS — E66.3 OVERWEIGHT: ICD-10-CM

## 2023-10-11 DIAGNOSIS — E28.319 ASYMPTOMATIC PREMATURE MENOPAUSE: ICD-10-CM

## 2023-10-11 DIAGNOSIS — Z71.89 OTHER SPECIFIED COUNSELING: ICD-10-CM

## 2023-10-11 DIAGNOSIS — N94.10 UNSPECIFIED DYSPAREUNIA: ICD-10-CM

## 2023-10-11 DIAGNOSIS — Z79.899 OTHER LONG TERM (CURRENT) DRUG THERAPY: ICD-10-CM

## 2023-10-11 DIAGNOSIS — N95.2 POSTMENOPAUSAL ATROPHIC VAGINITIS: ICD-10-CM

## 2023-10-11 PROCEDURE — 99442: CPT

## 2023-12-08 PROBLEM — N94.10 DYSPAREUNIA IN FEMALE: Status: ACTIVE | Noted: 2023-07-11

## 2023-12-08 PROBLEM — E28.319 PREMATURE MENOPAUSE: Status: ACTIVE | Noted: 2023-07-11

## 2023-12-08 PROBLEM — Z79.899 MEDICATION MANAGEMENT: Status: ACTIVE | Noted: 2023-12-08

## 2023-12-08 PROBLEM — N95.2 ATROPHIC VULVOVAGINITIS: Status: ACTIVE | Noted: 2023-07-11

## 2023-12-08 PROBLEM — E66.3 OVERWEIGHT: Status: ACTIVE | Noted: 2019-08-27

## 2023-12-08 PROBLEM — Z71.89 COUNSELING FOR HORMONE REPLACEMENT THERAPY: Status: ACTIVE | Noted: 2023-07-12

## 2023-12-08 PROBLEM — Z71.89 OTHER SPECIFIED COUNSELING: Status: ACTIVE | Noted: 2023-07-12

## 2024-01-22 NOTE — DISCHARGE NOTE NURSING/CASE MANAGEMENT/SOCIAL WORK - MODE OF TRANSPORTATION
Subjective   History of Present Illness  Chief complaint: Patient is an 87-year-old female.  She historically had been on Eliquis and Plavix.  She was seen here the other day for hematuria.  Her cardiologist was consulted and they recommended stopping the Eliquis.  She has been off of that since the 19th.  She is continued on the Plavix.  They have noticed improvement in her hematuria.  She actually is here today with headache left blurry vision and left facial numbness.  This started over the last 10 days.  She hit her head and then getting into it.  She had some bruising and swelling to the top of her head that is since gone down to her forehead.  However she is now having pain on the left side in a different area of her head.  Yesterday she began having blurry vision and left facial numbness as well.  They presented here for evaluation.  She feels increasing fatigue    Context:    Duration:    Timing:    Severity: Severe    Associated Symptoms:        PCP:  LMP:      Review of Systems   Constitutional:  Positive for fatigue. Negative for fever.   HENT: Negative.     Eyes:  Positive for visual disturbance.   Cardiovascular:  Negative for chest pain.   Gastrointestinal:  Positive for nausea. Negative for abdominal pain.   Neurological:  Positive for dizziness, numbness and headaches.       Past Medical History:   Diagnosis Date    Anemia     Arthritis     CAD (coronary artery disease)     Chronic back pain     Diabetic neuropathy     Essential hypertension     GERD (gastroesophageal reflux disease)     History of gastric ulcer     History of stomach ulcers     HLD (hyperlipidemia)     Hypocalcemia     Hypoparathyroidism     Hypothyroidism     Insomnia     Kidney stones     Osteoporosis     Recurrent sinusitis     Renal disease     Thyroid cancer     Type 2 diabetes mellitus        Allergies   Allergen Reactions    Penicillins Unknown - High Severity     Lips swell and hives    Shellfish-Derived Products Unknown -  Low Severity    Sulfamethizole Unknown - Low Severity    Trimethoprim Unknown - Low Severity       Past Surgical History:   Procedure Laterality Date    CARDIAC CATHETERIZATION N/A 2023    Procedure: Left Heart Cath and coronary angiogram;  Surgeon: John Baldwin MD;  Location: [facility] CATH INVASIVE LOCATION;  Service: Cardiovascular;  Laterality: N/A;    CORONARY STENT PLACEMENT      x 3    CYSTOSCOPY, URETEROSCOPY, RETROGRADE PYELOGRAM, STENT INSERTION Left 10/18/2023    Procedure: CYSTOSCOPY URETEROSCOPY RETROGRADE PYELOGRAM HOLMIUM LASER STENT INSERTION;  Surgeon: Juan Alaniz MD;  Location: [facility] MAIN OR;  Service: Urology;  Laterality: Left;    HYSTERECTOMY      LUNG LOBECTOMY      THYROIDECTOMY         Family History   Problem Relation Age of Onset    Diabetes Mother     Cancer Father     Heart disease Sister     Diabetes Sister     Heart disease Maternal Grandmother        Social History     Socioeconomic History    Marital status:     Number of children: 2    Highest education level: 11th grade   Tobacco Use    Smoking status: Former     Packs/day: 2.00     Years: 30.00     Additional pack years: 0.00     Total pack years: 60.00     Types: Cigarettes     Quit date:      Years since quittin0     Passive exposure: Past    Smokeless tobacco: Never   Vaping Use    Vaping Use: Never used   Substance and Sexual Activity    Alcohol use: Yes     Alcohol/week: 1.0 standard drink of alcohol     Types: 1 Glasses of wine per week    Drug use: Defer    Sexual activity: Defer           Objective   Physical Exam  Vitals and nursing note reviewed.   Constitutional:       Appearance: Normal appearance.   HENT:      Head: Normocephalic and atraumatic.   Eyes:      Extraocular Movements: Extraocular movements intact.      Pupils: Pupils are equal, round, and reactive to light.      Comments: She appears to have a left visual field deficit.   Cardiovascular:      Rate and Rhythm: Normal rate and  regular rhythm.   Pulmonary:      Effort: Pulmonary effort is normal.      Breath sounds: Normal breath sounds.   Abdominal:      Tenderness: There is no abdominal tenderness.   Musculoskeletal:      Cervical back: Normal range of motion. No tenderness.   Skin:     General: Skin is warm and dry.   Neurological:      Mental Status: She is alert.      Sensory: Sensory deficit present.      Comments: Patient with left peripheral lateral visual field deficit.  Patient with subjective numbness to the left face.  No other neurologic findings.   Psychiatric:         Mood and Affect: Mood normal.         Thought Content: Thought content normal.         Procedures           ED Course                                 Results for orders placed or performed during the hospital encounter of 01/22/24   Comprehensive Metabolic Panel    Specimen: Blood   Result Value Ref Range    Glucose 171 (H) 65 - 99 mg/dL    BUN 10 8 - 23 mg/dL    Creatinine 0.77 0.57 - 1.00 mg/dL    Sodium 141 136 - 145 mmol/L    Potassium 4.1 3.5 - 5.2 mmol/L    Chloride 105 98 - 107 mmol/L    CO2 25.0 22.0 - 29.0 mmol/L    Calcium 8.6 8.6 - 10.5 mg/dL    Total Protein 7.1 6.0 - 8.5 g/dL    Albumin 4.4 3.5 - 5.2 g/dL    ALT (SGPT) 11 1 - 33 U/L    AST (SGOT) 16 1 - 32 U/L    Alkaline Phosphatase 57 39 - 117 U/L    Total Bilirubin 0.4 0.0 - 1.2 mg/dL    Globulin 2.7 gm/dL    A/G Ratio 1.6 g/dL    BUN/Creatinine Ratio 13.0 7.0 - 25.0    Anion Gap 11.0 5.0 - 15.0 mmol/L    eGFR 75.7 >60.0 mL/min/1.73   Protime-INR    Specimen: Blood   Result Value Ref Range    Protime 11.2 9.6 - 11.7 Seconds    INR 1.03 0.93 - 1.10   aPTT    Specimen: Blood   Result Value Ref Range    PTT 26.8 (L) 61.0 - 76.5 seconds   Magnesium    Specimen: Blood   Result Value Ref Range    Magnesium 1.5 (L) 1.6 - 2.4 mg/dL   Lipase    Specimen: Blood   Result Value Ref Range    Lipase 12 (L) 13 - 60 U/L   CBC Auto Differential    Specimen: Blood   Result Value Ref Range    WBC 7.10 3.40 - 10.80  10*3/mm3    RBC 4.23 3.77 - 5.28 10*6/mm3    Hemoglobin 12.8 12.0 - 15.9 g/dL    Hematocrit 39.8 34.0 - 46.6 %    MCV 94.0 79.0 - 97.0 fL    MCH 30.3 26.6 - 33.0 pg    MCHC 32.2 31.5 - 35.7 g/dL    RDW 14.5 12.3 - 15.4 %    RDW-SD 47.7 37.0 - 54.0 fl    MPV 9.5 6.0 - 12.0 fL    Platelets 273 140 - 450 10*3/mm3    Neutrophil % 63.7 42.7 - 76.0 %    Lymphocyte % 27.2 19.6 - 45.3 %    Monocyte % 6.0 5.0 - 12.0 %    Eosinophil % 1.8 0.3 - 6.2 %    Basophil % 1.3 0.0 - 1.5 %    Neutrophils, Absolute 4.50 1.70 - 7.00 10*3/mm3    Lymphocytes, Absolute 1.90 0.70 - 3.10 10*3/mm3    Monocytes, Absolute 0.40 0.10 - 0.90 10*3/mm3    Eosinophils, Absolute 0.10 0.00 - 0.40 10*3/mm3    Basophils, Absolute 0.10 0.00 - 0.20 10*3/mm3    nRBC 0.0 0.0 - 0.2 /100 WBC   ECG 12 Lead Other; dizziness   Result Value Ref Range    QT Interval 403 ms    QTC Interval 447 ms             CT Head Without Contrast    Result Date: 1/22/2024  Impression: 1. No acute intracranial finding or significant change compared to 10/18/2023. 2. Moderately advanced chronic microvascular disease and moderate generalized atrophy. Electronically Signed: Virginia Acevedo MD  1/22/2024 1:30 PM EST  Workstation ID: [id number]       Medical Decision Making  Patient was seen and evaluated for the above problem    Differential diagnosis includes but is not limited to intracerebral hemorrhage, concussion, temporal arteritis, CVA    Patient presents 10 days after she injured her head.  She had been on Eliquis but had it stopped 2 days ago.  Her CT scan brain shows no hemorrhage.  No emergent findings.  Reviewed by myself.  Being here 3 days ago she was told to DC her Eliquis secondary to persisting hematuria.  However now she is having left-sided facial numbness and visual field deficit.  I am concerned for possible ischemic incident as well.  Discussed admission to the hospital with  and patient who agreed.  CT angiogram of the head and neck have been ordered as  well as neurology consult.  Her symptoms started yesterday.  She is out of the window.  She has no large vessel neurologic findings.    Problems Addressed:  Facial numbness: complicated acute illness or injury  Hypomagnesemia: complicated acute illness or injury  Intractable headache, unspecified chronicity pattern, unspecified headache type: complicated acute illness or injury  Visual field defect: complicated acute illness or injury    Amount and/or Complexity of Data Reviewed  Labs: ordered. Decision-making details documented in ED Course.     Details: Labs reviewed by myself  Radiology: ordered and independent interpretation performed.     Details: CT scan reviewed by myself as above  ECG/medicine tests: ordered and independent interpretation performed.     Details: EKG sinus rhythm poor wave progression and rate of 74    Risk  Prescription drug management.  Decision regarding hospitalization.        Final diagnoses:   None   Headache  Visual field deficit  Left facial numbness  Hypomagnesemia    ED Disposition  ED Disposition       None            No follow-up provider specified.       Medication List      No changes were made to your prescriptions during this visit.            Bola Martínez,   01/22/24 1414       Bola Martínez,   01/22/24 1415     Wheelchair/Stroller

## 2024-04-02 NOTE — ED ADULT NURSE NOTE - AS SC BRADEN MOISTURE
Patient is poor historian.  All information was obtained from the ER physician.  Patient comes from nursing home.  With altered mental status and pinpoint pupils.  Received Narcan with improvement of his mental status.  As per patient patient does not take any narcotics at the nursing home.    Patient also had sugar of 30s and repeat sugar was 110.  Patient also was hypoglycemic at the nursing home.  In the ER patient found to have hyperkalemia renal insufficiency and hypotension received IV fluids    Past Medical History:          PAST MEDICAL HISTORY   Diagnosis Date    Acute deep vein thrombosis (DVT) of left upper extremity (HCC) 08/12/2021    Aorto-iliac atherosclerosis (HCC)  (HCC)      Congestive heart failure (CHF) (HCC)      Constipation      Diabetes mellitus type 2 (HCC)      Essential hypertension      GERD (gastroesophageal reflux disease)      HLD (hyperlipidemia)      Occlusion of right iliac artery (HCC)      Osteomyelitis (HCC)      PAD (peripheral artery disease) (HCC)        Past Surgical History:           PAST SURGICAL HISTORY   Procedure Laterality Date    AMPUTAT.LOWER LEG AT KNEE Left      BYPASS GRAFT OTHR,AXILL-FEM-FEM Bilateral 07/15/2019    BYPASS GRAFT, FEMORAL-DISTAL Left 11/08/2021    F ENDARTERECTOMY FEMORAL PROFUNDA Right 07/15/2019      Family History:           FAMILY HISTORY    Problem Relation Age of Onset    Lung Cancer Father         No past medical history on file.  No past surgical history on file.  Social History       Tobacco History       Smoking Status  Never      Passive Exposure  Never      Smokeless Tobacco Use  Never              Alcohol History       Alcohol Use Status  Never              Drug Use       Drug Use Status  Never              Sexual Activity       Sexually Active  Not Asked                  No family history on file.  No current facility-administered medications on file prior to encounter.     Current Outpatient Medications on File Prior to Encounter 
(4) rarely moist

## 2024-06-03 NOTE — CHART NOTE - NSCHARTNOTEFT_GEN_A_CORE
STEVE PIERRE  [id number]  49y/o Female, w/ PMH of obesity (BMI 35), DM, HTN, HLD, QUINTON (w/ oral apparatus), Rojas's esophagus, ex-smoker (quit 8/2019, 30 pyh) presented to [facility] for elective lap sleeve gastrectomy. Procedure was without acute complications, patient remained hemodynamically stable throughout, and was successfully extubated.    Indication for ICU admission:  Admit Date:06-29-20  ICU Date: 6/29/20  OR Date: 6/29/20    No Known Allergies    PAST MEDICAL & SURGICAL HISTORY:  Barretts syndrome  Anxiety  Smoker: not currentSTOPPED 8/30/19  GERD (gastroesophageal reflux disease)  Obese  QUINTON (obstructive sleep apnea): oral device  High cholesterol  DM (diabetes mellitus)  HTN (hypertension)  H/O lumpectomy  S/P hysterectomy: 2015        Overnight Events: raya d/c at midnight, postoperative tachycardia  likely secondary to pain control, given morphine 2 mg and on IV tylenol, toradol--> HR now<100    NEURO:   Pain - controlled with IV Tylenol, Toradol,   Nausea controlled w/ Zofran    RESP:   On RA  Hx of QUINTON (has oral apparatus, no CPAP)  Hx of smoking, quit 8/2019 (30 pyh)  AM CXR    CARDS:  Tachycardia likely 2/2 pain control--> now <100 after pain control  Hx of HTN - holding home Amlodipine, Losartan while NPO  Maintain MAP > 65  Hx of HLD - holding home statin while NPO  EKG: NSR    GI/NUTR:   Diet: advance to clear liquids  Hx of Rojas's  GI ppx: PPI    /RENAL:   d/c raya  Monitor electrolytes and replete PRN  Na 140, K 3.9, Mg 2.1, Phos 3.4    HEME/ONC:   f/u pm labs  DVT ppx: HSQ    ID:   Afebrile  on post-op cefotetan  COVID neg 6/26, IGg positive 5/19/20      ENDO:  Hx of DM, A1c 5.8 - holding home meds while NPO  on ISS, FS q4 while NPO      LINES/DRAINS: PIV    DVT PTX: HSQ STEVE PIERRE  [id number]  49y/o Female, w/ PMH of obesity (BMI 35), DM, HTN, HLD, QUINTON (w/ oral apparatus), Rojas's esophagus, ex-smoker (quit 8/2019, 30 pyh) presented to [facility] for elective lap gastric bypass. Procedure was without acute complications, patient remained hemodynamically stable throughout, and was successfully extubated.    Indication for ICU admission: gastric bypass  Admit Date:06-29-20  ICU Date: 6/29/20  OR Date: 6/29/20    No Known Allergies    PAST MEDICAL & SURGICAL HISTORY:  Barretts syndrome  Anxiety  Smoker: not currentSTOPPED 8/30/19  GERD (gastroesophageal reflux disease)  Obese  QUINTON (obstructive sleep apnea): oral device  High cholesterol  DM (diabetes mellitus)  HTN (hypertension)  H/O lumpectomy  S/P hysterectomy: 2015    Overnight Events: raya d/c at midnight, postoperative tachycardia  likely secondary to pain control, given morphine 2 mg and on IV tylenol, toradol--> HR now<100    NEURO:   Pain - controlled with IV Tylenol, Toradol,   Nausea controlled w/ Zofran    RESP:   On RA  Hx of QUINTON (has oral apparatus, no CPAP)  Hx of smoking, quit 8/2019 (30 pyh)  AM CXR    CARDS:  Tachycardia likely 2/2 pain control--> now <100 after pain control  Hx of HTN - holding home Amlodipine, Losartan while NPO  Maintain MAP > 65  Hx of HLD - holding home statin while NPO  EKG: NSR    GI/NUTR:   Diet: advance to clear liquids  Hx of Rojas's  GI ppx: PPI    /RENAL:   d/c raya  Monitor electrolytes and replete PRN  Na 140, K 3.9, Mg 2.1, Phos 3.4    HEME/ONC:   f/u pm labs  DVT ppx: HSQ    ID:   Afebrile  on post-op cefotetan  COVID neg 6/26, IGg positive 5/19/20      ENDO:  Hx of DM, A1c 5.8 - holding home meds while NPO  on ISS, FS q4 while NPO      LINES/DRAINS: PIV    DVT PTX: HSQ    follow up  -tavon clears   -PO meds  -received 3 doses of IV tylenol      Dr Jiménez signed out to Dr Puente @ [phone] [PERRLA] : JAMES [Eyelids] : normal eyelids [Pupils] : pupils were equal and round [Iris] : normal iris [Gums] : normal gums [Mucosa] : moist and pink mucosa [Palate] : normal palate [S1, S2 Present] : S1, S2 present [Cardiac Auscultation] : normal cardiac auscultation  [Peripheral Pulses] : positive peripheral pulses [Respiratory Effort] : normal respiratory effort [Clear to auscultation] : clear to auscultation [Soft] : soft [NonTender] : non tender [Non Distended] : non distended [Normal Bowel Sounds] : normal bowel sounds [No Hepatosplenomegaly] : no hepatosplenomegaly [No Abnormal Lymph Nodes Palpated] : no abnormal lymph nodes palpated [Refer to Joint Diagram Below] : refer to joint diagram below [Digits] : normal digits [Muscle Strength] : normal muscle strength [Range Of Motion] : full range of motion [Gait] : normal gait [Cranial nerves grossly intact] : cranial nerves grossly intact [Intact Judgement] : intact judgement  [Insight Insight] : intact insight [Not Examined] : not examined [0] : 0 [Thumbs bend back to reach forearm] : thumbs bend back to reach forearm [Hyperextension of elbows] : hyperextension of elbows  [Acute distress] : no acute distress [Rash] : no rash [Malar Erythema] : no malar erythema [Erythematous Conjunctiva] : nonerythematous conjunctiva [Erythematous Oropharynx] : nonerythematous oropharynx [Lesions] : no lesions [Mass (___cm)] : no neck masses [Murmurs] : no murmurs [Peripheral Edema] : no peripheral edema  [Joint effusions] : no joint effusions [de-identified] : no arthritis, negative JEFFERSON bilaterally [_______] : Knee: [unfilled] [NumbJointsActiveArthritis] : 2 [NumbJointsLimitedMotion] : 0 [cJADASscore] : 0 [de-identified] : FROM C-spine [de-identified] : No evidence of enthesitis on exam

## 2024-07-23 NOTE — PROGRESS NOTE ADULT - SUBJECTIVE AND OBJECTIVE BOX
GENERAL SURGERY PROGRESS NOTE     STEVE PIERRE  Female  Hospital day : 2    EVENTS IN THE LAST 24 HRS: No acute events, patient refers improvement of symptoms, still with headaches, myalgias, BM +      T(C): 36.4 (20 @ 23:46), Max: 37.9 (20 @ 08:10)  HR: 80 (20 @ 23:46) (75 - 95)  BP: 116/62 (20 @ 23:46) (114/73 - 141/79)  RR: 17 (20 @ 23:46) (16 - 18)  SpO2: 99% (20 @ 16:54) (98% - 99%)    PHYSICAL EXAM:  GENERAL: NAD, well-appearing  CHEST/LUNG: Clear to auscultation bilaterally  HEART: Regular rate and rhythm  ABDOMEN: Soft, Nontender, Nondistended;   EXTREMITIES:  No clubbing, cyanosis, or edema      DIET/FLUIDS: dextrose 5%. 1000 milliLiter(s) IV Continuous <Continuous>  dextrose 5%. 1000 milliLiter(s) IV Continuous <Continuous>  sodium chloride 0.9% 1000 milliLiter(s) IV Continuous <Continuous>    GI proph:  pantoprazole    Tablet 40 milliGRAM(s) Oral before breakfast    AC/ proph: enoxaparin Injectable 40 milliGRAM(s) SubCutaneous daily    ABx: ciprofloxacin   IVPB 400 milliGRAM(s) IV Intermittent every 12 hours  metroNIDAZOLE  IVPB 500 milliGRAM(s) IV Intermittent every 8 hours    LABS  Labs:  CAPILLARY BLOOD GLUCOSE                           12.2   7.33  )-----------( 205      ( 2020 17:31 )             37.3       Auto Neutrophil %: 87.9 % (20 @ 17:31)  Auto Immature Granulocyte %: 0.4 % (20 @ 17:31)        140  |  108  |  15  ----------------------------<  123<H>  3.8   |  17  |  0.7      Calcium, Total Serum: 8.8 mg/dL (20 @ 17:31)      LFTs:             6.0  | 0.5  | 22       ------------------[45      ( 2020 17:31 )  4.0  | x    | 19          Lipase:19     Amylase:x         Lactate, Blood: 1.3 mmol/L (20 @ 17:31)      Urinalysis Basic - ( 2020 17:43 )    Color: Yellow / Appearance: Clear / S.050 / pH: x  Gluc: x / Ketone: Small  / Bili: Small / Urobili: 3 mg/dL   Blood: x / Protein: 30 mg/dL / Nitrite: Negative   Leuk Esterase: Moderate / RBC: 8 /HPF / WBC 26 /HPF   Sq Epi: x / Non Sq Epi: 7 /HPF / Bacteria: Moderate        RADIOLOGY & ADDITIONAL TESTS:  < from: CT Abdomen and Pelvis w/ Oral Cont and w/ IV Cont (20 @ 20:03) >  IMPRESSION:    Long segment bowel wall thickening and inflammatory stranding involving the cecum, ascending colon and proximal transverse colon consistent with colitis which may be infectious or inflammatory in etiology. No evidence of obstruction.    Status post gastric bypass.    < end of copied text >               .

## 2024-12-10 ENCOUNTER — APPOINTMENT (OUTPATIENT)
Dept: OBGYN | Facility: CLINIC | Age: 54
End: 2024-12-10
Payer: COMMERCIAL

## 2024-12-10 ENCOUNTER — NON-APPOINTMENT (OUTPATIENT)
Age: 54
End: 2024-12-10

## 2024-12-10 VITALS
SYSTOLIC BLOOD PRESSURE: 112 MMHG | HEIGHT: 63 IN | TEMPERATURE: 98.6 F | BODY MASS INDEX: 21.44 KG/M2 | DIASTOLIC BLOOD PRESSURE: 71 MMHG | HEART RATE: 77 BPM | WEIGHT: 121 LBS

## 2024-12-10 DIAGNOSIS — Z71.89 OTHER SPECIFIED COUNSELING: ICD-10-CM

## 2024-12-10 DIAGNOSIS — Z12.4 ENCOUNTER FOR SCREENING FOR MALIGNANT NEOPLASM OF CERVIX: ICD-10-CM

## 2024-12-10 DIAGNOSIS — N94.10 UNSPECIFIED DYSPAREUNIA: ICD-10-CM

## 2024-12-10 DIAGNOSIS — N95.2 POSTMENOPAUSAL ATROPHIC VAGINITIS: ICD-10-CM

## 2024-12-10 DIAGNOSIS — Z01.419 ENCOUNTER FOR GYNECOLOGICAL EXAMINATION (GENERAL) (ROUTINE) W/OUT ABNORMAL FINDINGS: ICD-10-CM

## 2024-12-10 PROCEDURE — 99396 PREV VISIT EST AGE 40-64: CPT

## 2024-12-12 LAB
C TRACH RRNA SPEC QL NAA+PROBE: NOT DETECTED
HPV HIGH+LOW RISK DNA PNL CVX: NOT DETECTED
N GONORRHOEA RRNA SPEC QL NAA+PROBE: NOT DETECTED
SOURCE AMPLIFICATION: NORMAL
SOURCE AMPLIFICATION: NORMAL
T VAGINALIS RRNA SPEC QL NAA+PROBE: NOT DETECTED

## 2024-12-14 LAB — CYTOLOGY CVX/VAG DOC THIN PREP: NORMAL

## 2025-02-24 ENCOUNTER — EMERGENCY (EMERGENCY)
Facility: HOSPITAL | Age: 55
LOS: 0 days | Discharge: HOME | End: 2025-02-24
Attending: EMERGENCY MEDICINE
Payer: COMMERCIAL

## 2025-02-24 VITALS
TEMPERATURE: 98 F | SYSTOLIC BLOOD PRESSURE: 114 MMHG | OXYGEN SATURATION: 96 % | DIASTOLIC BLOOD PRESSURE: 74 MMHG | RESPIRATION RATE: 18 BRPM | HEART RATE: 75 BPM

## 2025-02-24 VITALS
DIASTOLIC BLOOD PRESSURE: 76 MMHG | RESPIRATION RATE: 18 BRPM | WEIGHT: 119.05 LBS | TEMPERATURE: 98 F | OXYGEN SATURATION: 99 % | HEART RATE: 74 BPM | SYSTOLIC BLOOD PRESSURE: 126 MMHG

## 2025-02-24 DIAGNOSIS — Z98.890 OTHER SPECIFIED POSTPROCEDURAL STATES: Chronic | ICD-10-CM

## 2025-02-24 DIAGNOSIS — R11.2 NAUSEA WITH VOMITING, UNSPECIFIED: ICD-10-CM

## 2025-02-24 DIAGNOSIS — E11.9 TYPE 2 DIABETES MELLITUS WITHOUT COMPLICATIONS: ICD-10-CM

## 2025-02-24 DIAGNOSIS — R10.13 EPIGASTRIC PAIN: ICD-10-CM

## 2025-02-24 DIAGNOSIS — I10 ESSENTIAL (PRIMARY) HYPERTENSION: ICD-10-CM

## 2025-02-24 DIAGNOSIS — R10.31 RIGHT LOWER QUADRANT PAIN: ICD-10-CM

## 2025-02-24 DIAGNOSIS — Z79.85 LONG-TERM (CURRENT) USE OF INJECTABLE NON-INSULIN ANTIDIABETIC DRUGS: ICD-10-CM

## 2025-02-24 DIAGNOSIS — Z90.710 ACQUIRED ABSENCE OF BOTH CERVIX AND UTERUS: ICD-10-CM

## 2025-02-24 DIAGNOSIS — Z90.710 ACQUIRED ABSENCE OF BOTH CERVIX AND UTERUS: Chronic | ICD-10-CM

## 2025-02-24 DIAGNOSIS — E78.5 HYPERLIPIDEMIA, UNSPECIFIED: ICD-10-CM

## 2025-02-24 DIAGNOSIS — K22.70 BARRETT'S ESOPHAGUS WITHOUT DYSPLASIA: ICD-10-CM

## 2025-02-24 DIAGNOSIS — Z98.84 BARIATRIC SURGERY STATUS: ICD-10-CM

## 2025-02-24 LAB
ALBUMIN SERPL ELPH-MCNC: 4.8 G/DL — SIGNIFICANT CHANGE UP (ref 3.5–5.2)
ALP SERPL-CCNC: 60 U/L — SIGNIFICANT CHANGE UP (ref 30–115)
ALT FLD-CCNC: 24 U/L — SIGNIFICANT CHANGE UP (ref 0–41)
ANION GAP SERPL CALC-SCNC: 10 MMOL/L — SIGNIFICANT CHANGE UP (ref 7–14)
APPEARANCE UR: CLEAR — SIGNIFICANT CHANGE UP
AST SERPL-CCNC: 22 U/L — SIGNIFICANT CHANGE UP (ref 0–41)
BASOPHILS # BLD AUTO: 0.04 K/UL — SIGNIFICANT CHANGE UP (ref 0–0.2)
BASOPHILS NFR BLD AUTO: 0.6 % — SIGNIFICANT CHANGE UP (ref 0–1)
BILIRUB SERPL-MCNC: 0.3 MG/DL — SIGNIFICANT CHANGE UP (ref 0.2–1.2)
BILIRUB UR-MCNC: NEGATIVE — SIGNIFICANT CHANGE UP
BUN SERPL-MCNC: 13 MG/DL — SIGNIFICANT CHANGE UP (ref 10–20)
CALCIUM SERPL-MCNC: 8.9 MG/DL — SIGNIFICANT CHANGE UP (ref 8.4–10.5)
CHLORIDE SERPL-SCNC: 108 MMOL/L — SIGNIFICANT CHANGE UP (ref 98–110)
CO2 SERPL-SCNC: 25 MMOL/L — SIGNIFICANT CHANGE UP (ref 17–32)
COLOR SPEC: SIGNIFICANT CHANGE UP
CREAT SERPL-MCNC: <0.5 MG/DL — LOW (ref 0.7–1.5)
DIFF PNL FLD: NEGATIVE — SIGNIFICANT CHANGE UP
EGFR: 119 ML/MIN/1.73M2 — SIGNIFICANT CHANGE UP
EOSINOPHIL # BLD AUTO: 0.22 K/UL — SIGNIFICANT CHANGE UP (ref 0–0.7)
EOSINOPHIL NFR BLD AUTO: 3.4 % — SIGNIFICANT CHANGE UP (ref 0–8)
GLUCOSE SERPL-MCNC: 76 MG/DL — SIGNIFICANT CHANGE UP (ref 70–99)
GLUCOSE UR QL: NEGATIVE MG/DL — SIGNIFICANT CHANGE UP
HCT VFR BLD CALC: 38.1 % — SIGNIFICANT CHANGE UP (ref 37–47)
HGB BLD-MCNC: 12.9 G/DL — SIGNIFICANT CHANGE UP (ref 12–16)
IMM GRANULOCYTES NFR BLD AUTO: 0.2 % — SIGNIFICANT CHANGE UP (ref 0.1–0.3)
KETONES UR-MCNC: ABNORMAL MG/DL
LEUKOCYTE ESTERASE UR-ACNC: NEGATIVE — SIGNIFICANT CHANGE UP
LIDOCAIN IGE QN: 47 U/L — SIGNIFICANT CHANGE UP (ref 7–60)
LYMPHOCYTES # BLD AUTO: 2.13 K/UL — SIGNIFICANT CHANGE UP (ref 1.2–3.4)
LYMPHOCYTES # BLD AUTO: 33 % — SIGNIFICANT CHANGE UP (ref 20.5–51.1)
MCHC RBC-ENTMCNC: 31.8 PG — HIGH (ref 27–31)
MCHC RBC-ENTMCNC: 33.9 G/DL — SIGNIFICANT CHANGE UP (ref 32–37)
MCV RBC AUTO: 93.8 FL — SIGNIFICANT CHANGE UP (ref 81–99)
MONOCYTES # BLD AUTO: 0.29 K/UL — SIGNIFICANT CHANGE UP (ref 0.1–0.6)
MONOCYTES NFR BLD AUTO: 4.5 % — SIGNIFICANT CHANGE UP (ref 1.7–9.3)
NEUTROPHILS # BLD AUTO: 3.76 K/UL — SIGNIFICANT CHANGE UP (ref 1.4–6.5)
NEUTROPHILS NFR BLD AUTO: 58.3 % — SIGNIFICANT CHANGE UP (ref 42.2–75.2)
NITRITE UR-MCNC: NEGATIVE — SIGNIFICANT CHANGE UP
NRBC BLD AUTO-RTO: 0 /100 WBCS — SIGNIFICANT CHANGE UP (ref 0–0)
PH UR: 6 — SIGNIFICANT CHANGE UP (ref 5–8)
PLATELET # BLD AUTO: 284 K/UL — SIGNIFICANT CHANGE UP (ref 130–400)
PMV BLD: 9.6 FL — SIGNIFICANT CHANGE UP (ref 7.4–10.4)
POTASSIUM SERPL-MCNC: 4.7 MMOL/L — SIGNIFICANT CHANGE UP (ref 3.5–5)
POTASSIUM SERPL-SCNC: 4.7 MMOL/L — SIGNIFICANT CHANGE UP (ref 3.5–5)
PROT SERPL-MCNC: 6.3 G/DL — SIGNIFICANT CHANGE UP (ref 6–8)
PROT UR-MCNC: NEGATIVE MG/DL — SIGNIFICANT CHANGE UP
RBC # BLD: 4.06 M/UL — LOW (ref 4.2–5.4)
RBC # FLD: 12.8 % — SIGNIFICANT CHANGE UP (ref 11.5–14.5)
SODIUM SERPL-SCNC: 143 MMOL/L — SIGNIFICANT CHANGE UP (ref 135–146)
SP GR SPEC: 1.03 — SIGNIFICANT CHANGE UP (ref 1–1.03)
UROBILINOGEN FLD QL: 1 MG/DL — SIGNIFICANT CHANGE UP (ref 0.2–1)
WBC # BLD: 6.45 K/UL — SIGNIFICANT CHANGE UP (ref 4.8–10.8)
WBC # FLD AUTO: 6.45 K/UL — SIGNIFICANT CHANGE UP (ref 4.8–10.8)

## 2025-02-24 PROCEDURE — 74177 CT ABD & PELVIS W/CONTRAST: CPT | Mod: MC

## 2025-02-24 PROCEDURE — 36415 COLL VENOUS BLD VENIPUNCTURE: CPT

## 2025-02-24 PROCEDURE — 85025 COMPLETE CBC W/AUTO DIFF WBC: CPT

## 2025-02-24 PROCEDURE — 96375 TX/PRO/DX INJ NEW DRUG ADDON: CPT

## 2025-02-24 PROCEDURE — 81003 URINALYSIS AUTO W/O SCOPE: CPT

## 2025-02-24 PROCEDURE — 80053 COMPREHEN METABOLIC PANEL: CPT

## 2025-02-24 PROCEDURE — 99285 EMERGENCY DEPT VISIT HI MDM: CPT

## 2025-02-24 PROCEDURE — 99284 EMERGENCY DEPT VISIT MOD MDM: CPT | Mod: 25

## 2025-02-24 PROCEDURE — 83690 ASSAY OF LIPASE: CPT

## 2025-02-24 PROCEDURE — 74177 CT ABD & PELVIS W/CONTRAST: CPT | Mod: 26

## 2025-02-24 PROCEDURE — 96374 THER/PROPH/DIAG INJ IV PUSH: CPT | Mod: XU

## 2025-02-24 RX ORDER — MORPHINE SULFATE 60 MG/1
4 TABLET, FILM COATED, EXTENDED RELEASE ORAL ONCE
Refills: 0 | Status: DISCONTINUED | OUTPATIENT
Start: 2025-02-24 | End: 2025-02-24

## 2025-02-24 RX ORDER — SODIUM CHLORIDE 9 G/ML
1000 INJECTION, SOLUTION INTRAVENOUS ONCE
Refills: 0 | Status: COMPLETED | OUTPATIENT
Start: 2025-02-24 | End: 2025-02-24

## 2025-02-24 RX ORDER — KETOROLAC TROMETHAMINE 10 MG
15 TABLET ORAL ONCE
Refills: 0 | Status: DISCONTINUED | OUTPATIENT
Start: 2025-02-24 | End: 2025-02-24

## 2025-02-24 RX ORDER — IOHEXOL 350 MG/ML
30 INJECTION, SOLUTION INTRAVENOUS ONCE
Refills: 0 | Status: COMPLETED | OUTPATIENT
Start: 2025-02-24 | End: 2025-02-24

## 2025-02-24 RX ADMIN — Medication 15 MILLIGRAM(S): at 12:57

## 2025-02-24 RX ADMIN — SODIUM CHLORIDE 1000 MILLILITER(S): 9 INJECTION, SOLUTION INTRAVENOUS at 12:57

## 2025-02-24 RX ADMIN — MORPHINE SULFATE 4 MILLIGRAM(S): 60 TABLET, FILM COATED, EXTENDED RELEASE ORAL at 15:31

## 2025-02-24 RX ADMIN — IOHEXOL 30 MILLILITER(S): 350 INJECTION, SOLUTION INTRAVENOUS at 12:57

## 2025-02-24 NOTE — ED PROVIDER NOTE - OBJECTIVE STATEMENT
54-year-old female with PMH gastric bypass (2005), total hysterectomy (2005), HTN, HLD, honeycutt's esophagus, DM on mounjaro presents to the ED complaining of  abdominal pain. Reports 1 week of intermittent stabbing RLQ pain that radiates to the R flank. Reports assosicated nausea and voimitting, Denies fevers, chills or dysuria. Patient reports similar episode a few years ago, had imaging done at that time which was negative. No changes in bowel habits.

## 2025-02-24 NOTE — CONSULT NOTE ADULT - ASSESSMENT
ASSESSMENT:  The patient is a 54 year old female with PMH HTN, HLD, DM, Rojas's esophagus, hx of Derrick-en-Y gastric bypass (by Dr. Antunez June 2020), hx of hysterectomy (2016), presented to ED for epigastric abdominal pain for approx 1 week. The patient says that for approximately a year, she has been experiencing episodes of epigastric abdominal pain for a few days that radiates to her back. Some of the episodes are more severe than others, and this time has been more painful. She was on a cruise for the past week and tried to take Motrin to help with the pain, but it did not relieve the pain. She says that it does not hurt to press on her abdomen, it is a more "internal" pain that is consistent. She has felt nauseous, and had 2 episodes of emesis in the past week. She has been passing gas and BM, but says shes slightly more constipated lately than usual. She underwent colonoscopy a few months ago which showed some polyps, but otherwise unremarkable. She underwent endoscopy approximately a year ago to follow up with her Rojas's esophagus, but does not remember being told anything unusual. She is afebrile and hemodynamically stable, labs within normal limits. CT obtained shows no acute abdominopelvic pathology, but appendix was not visualized so surgery team was consulted to evaluate.     PLAN:  - Patient nontender in RLQ, has no leukocytosis, low likelihood for appendicitis  - No acute surgical intervention  - Patient with significant stool burden on CT, recommend stool softeners PRN  - Recommend GI consult for possible endoscopy    Above plan discussed with Attending Surgeon Dr. Sullivan, patient, patient family, and ED  02-24-25 @ 17:07 ASSESSMENT:  The patient is a 54 year old female with PMH HTN, HLD, DM, Rojas's esophagus, hx of Derrick-en-Y gastric bypass (by Dr. Antunez June 2020), hx of hysterectomy (2016), presented to ED for epigastric abdominal pain for approx 1 week. The patient says that for approximately a year, she has been experiencing episodes of epigastric abdominal pain for a few days that radiates to her back. Some of the episodes are more severe than others, and this time has been more painful. She was on a cruise for the past week and tried to take Motrin to help with the pain, but it did not relieve the pain. She says that it does not hurt to press on her abdomen, it is a more "internal" pain that is consistent. She has felt nauseous, and had 2 episodes of emesis in the past week. She has been passing gas and BM, but says shes slightly more constipated lately than usual. She underwent colonoscopy a few months ago which showed some polyps, but otherwise unremarkable. She underwent endoscopy approximately a year ago to follow up with her Rojas's esophagus, but does not remember being told anything unusual. She is afebrile and hemodynamically stable, labs within normal limits. CT obtained shows no acute abdominopelvic pathology, but appendix was not visualized so surgery team was consulted to evaluate.     PLAN:  - Patient nontender in RLQ, has no leukocytosis, low likelihood for appendicitis  - No acute surgical intervention  - Patient with significant stool burden on CT, recommend stool softeners PRN  - Recommend GI consult for possible endoscopy to rule out ulcer  - Recommend increasing home omeprazole to 40 BID and taking carafate 250 before meals and at bedtime  - Please have patient follow up with bariatric surgery office     Above plan discussed with Attending Surgeon Dr. Sullivan, patient, patient family, and ED  02-24-25 @ 17:07

## 2025-02-24 NOTE — ED ADULT NURSE NOTE - NSFALLUNIVINTERV_ED_ALL_ED
Bed/Stretcher in lowest position, wheels locked, appropriate side rails in place/Call bell, personal items and telephone in reach/Instruct patient to call for assistance before getting out of bed/chair/stretcher/Non-slip footwear applied when patient is off stretcher/Childs to call system/Physically safe environment - no spills, clutter or unnecessary equipment/Purposeful proactive rounding/Room/bathroom lighting operational, light cord in reach

## 2025-02-24 NOTE — ED PROVIDER NOTE - CLINICAL SUMMARY MEDICAL DECISION MAKING FREE TEXT BOX
Received from cicayda.  54 year old female with PMH HTN, HLD, DM, Rojas's esophagus, hx of Derrick-en-Y gastric bypass (by Dr. Antunez June 2020), hx of hysterectomy (2016), presented to ED for epigastric abdominal pain for approx 1 week. The patient says that for approximately a year, she has been experiencing episodes of epigastric abdominal pain for a few days that radiates to her back. Some of the episodes are more severe than others, and this time has been more painful. She was on a cruise for the past week and tried to take Motrin to help with the pain, but it did not relieve the pain. She says that it does not hurt to press on her abdomen, it is a more "internal" pain that is consistent. She has felt nauseous, and had 2 episodes of emesis in the past week. She has been passing gas and BM, but says shes slightly more constipated lately than usual. She underwent colonoscopy a few months ago which showed some polyps, but otherwise unremarkable. She underwent endoscopy approximately a year ago to follow up with her Rojas's esophagus, but does not remember being told anything unusual. She is afebrile and hemodynamically stable, labs within normal limits. CT obtained shows no acute abdominopelvic pathology, but appendix was not visualized so surgery team was consulted to evaluate.   - Patient nontender in RLQ, has no leukocytosis, low likelihood for appendicitis  - No acute surgical intervention  - Patient with significant stool burden on CT, recommend stool softeners PRN  - Recommend increasing home omeprazole to 40 BID and taking carafate 250 before meals and at bedtime  - Please have patient follow up with bariatric surgery office   -Discussed with patient at bedside.

## 2025-02-24 NOTE — ED PROVIDER NOTE - PROGRESS NOTE DETAILS
CT unable to visualize appendix, otherwise shows no acute intraabdominal pathology, Patient reassessed, continuing to report pain despite medications; more meds ordered. Lab and imaging results discussed with patient. General surgery consulted. Patient signed out to Dr. Abad pending surgical evaluation and reassessment. Patient evaluated by surgery at bedside, per surgery low likelihood appendicitis, recommends GI for endoscopy..  Patient reassessed, continues to report abdominal pain however states pain is less severe and feels like she can go home.  Patient established with gastroenterologist, will pursue endoscopy outpatient.  Patient given strict return precautions and verbalizes understanding.

## 2025-02-24 NOTE — ED PROVIDER NOTE - DATE/TIME 1
Detail Level: Zone Detail Level: Generalized Quality 137: Melanoma: Continuity Of Care - Recall System: Patient information entered into a recall system that includes: target date for the next exam specified AND a process to follow up with patients regarding missed or unscheduled appointments Detail Level: Detailed When Should The Patient Follow-Up For Their Next Full-Body Skin Exam?: 3 Months Detail Level: Simple Sunscreen Recommendations: Mineral based 24-Feb-2025 15:42

## 2025-02-24 NOTE — ED PROVIDER NOTE - ATTENDING CONTRIBUTION TO CARE
54-year-old female past med history hypertension, hyperlipidemia, diabetes,, gastric sleeve in 2005, hysterectomy 2016, presents with abdominal pain.  Patient reports for the last week she has been intermittent episodes of pain to her right lower abdomen rating to her right flank.  Associate with nausea vomiting earlier.  No diarrhea.  No fevers or chills.  No urinary symptoms.  Reports she had a similar episode few years ago with imaging at that time which came back negative.,  No symptoms resolved on her own..  No chest pain shortness of breath or palpitations.    CONSTITUTIONAL: Well-developed; well-nourished; in no acute distress.   SKIN: warm, dry  HEAD: Normocephalic; atraumatic.  EYES: PERRL, EOMI, no conjunctival erythema  ENT: No nasal discharge; airway clear.  NECK: Supple; non tender.  CARD: S1, S2 normal;  Regular rate and rhythm.   RESP: No wheezes, rales or rhonchi.  ABD: soft + RLQ tenderness. no cva tenderness, non distended, no rebound or guarding  EXT: Normal ROM.  5/5 strength in all 4 extremities   LYMPH: No acute cervical adenopathy.  NEURO: Alert, oriented, grossly unremarkable. neurovascularly intact  PSYCH: Cooperative, appropriate.

## 2025-02-24 NOTE — CONSULT NOTE ADULT - SUBJECTIVE AND OBJECTIVE BOX
GENERAL SURGERY CONSULT NOTE    Patient: STEVE PIERRE , 54y (74)Female   MRN: [id number]  Location: [facility] ED  Visit: 25 Emergency  Date: 25 @ 17:07    HPI:  The patient is a 54 year old female with PMH HTN, HLD, DM, Rojas's esophagus, hx of Derrick-en-Y gastric bypass (by Dr. Antunez 2020), hx of hysterectomy (), presented to ED for epigastric abdominal pain for approx 1 week. The patient says that for approximately a year, she has been experiencing episodes of epigastric abdominal pain for a few days that radiates to her back. Some of the episodes are more severe than others, and this time has been more painful. She was on a cruise for the past week and tried to take Motrin to help with the pain, but it did not relieve the pain. She says that it does not hurt to press on her abdomen, it is a more "internal" pain that is consistent. She has felt nauseous, and had 2 episodes of emesis in the past week. She has been passing gas and BM, but says shes slightly more constipated lately than usual. She underwent colonoscopy a few months ago which showed some polyps, but otherwise unremarkable. She underwent endoscopy approximately a year ago to follow up with her Rojas's esophagus, but does not remember being told anything unusual. She is afebrile and hemodynamically stable, labs within normal limits. CT obtained shows no acute abdominopelvic pathology, but appendix was not visualized so surgery team was consulted to evaluate.       PAST MEDICAL & SURGICAL HISTORY:  HTN (hypertension)      DM (diabetes mellitus)      High cholesterol      QUINTON (obstructive sleep apnea)  oral device      Obese      GERD (gastroesophageal reflux disease)      Smoker  not currentSTOPPED 19      Anxiety      Barretts syndrome      S/P hysterectomy        H/O lumpectomy    Home Medications:  acetaminophen 325 mg oral tablet: 2 tab(s) orally every 6 hours, As needed, Temp greater or equal to 38C (100.4F), Mild Pain (1 - 3), Moderate Pain (4 - 6) (2020 09:28)  ALPRAZolam 0.5 mg oral tablet: 0.5 milligram(s) orally 2 times a day (2020 02:24)  atorvastatin 20 mg oral tablet: 1 tab(s) orally once a day (2020 02:24)  Invokana 300 mg oral tablet: 1 tab(s) orally once a day (:24)  losartan 100 mg oral tablet: 1 tab(s) orally once a day (:24)  omeprazole 40 mg oral delayed release capsule: 1 cap(s) orally once a day (:24)  pioglitazone 30 mg oral tablet: 1 tab(s) orally once a day (:24)  ursodiol 300 mg oral tablet: 300 milligram(s) orally 2 times a day (:24)      VITALS:  T(F): 98.2 (25 @ 16:02), Max: 98.2 (25 @ 16:02)  HR: 75 (25 @ 16:02) (74 - 75)  BP: 114/74 (25 @ 16:02) (114/74 - 126/76)  RR: 18 (25 @ 16:02) (18 - 18)  SpO2: 96% (25 @ 16:02) (96% - 99%)    PHYSICAL EXAM:  General: calm and cooperative  HEENT: NCAT, ROBBIE, EOMI  Cardiac: RRR S1, S2  Respiratory: normal respiratory effort  Abdomen: Soft, non-distended, non-tender to palpation, though upon palpating epigastric area patient endorses this is where pain is felt  Musculoskeletal: ROM intact, compartments soft  Neuro: Sensation grossly intact and equal throughout, no focal deficits  Vascular: extremities well perfused  Skin: Warm/dry, normal color, no jaundice      MEDICATIONS  (STANDING):    MEDICATIONS  (PRN):      LAB/STUDIES:                        12.9   6.45  )-----------( 284      ( 2025 14:39 )             38.1     -    143  |  108  |  13  ----------------------------<  76  4.7   |  25  |  <0.5[L]    Ca    8.9      2025 14:39    TPro  6.3  /  Alb  4.8  /  TBili  0.3  /  DBili  x   /  AST  22  /  ALT  24  /  AlkPhos  60  02-24      LIVER FUNCTIONS - ( 2025 14:39 )  Alb: 4.8 g/dL / Pro: 6.3 g/dL / ALK PHOS: 60 U/L / ALT: 24 U/L / AST: 22 U/L / GGT: x           Urinalysis Basic - ( 2025 14:39 )    Color: Dark Yellow / Appearance: Clear / S.027 / pH: x  Gluc: 76 mg/dL / Ketone: Trace mg/dL  / Bili: Negative / Urobili: 1.0 mg/dL   Blood: x / Protein: Negative mg/dL / Nitrite: Negative   Leuk Esterase: Negative / RBC: x / WBC x   Sq Epi: x / Non Sq Epi: x / Bacteria: x        Urinalysis with Rflx Culture (collected 2025 14:39)

## 2025-02-24 NOTE — ED PROVIDER NOTE - NSFOLLOWUPINSTRUCTIONS_ED_ALL_ED_FT
Follow up with your gastroenterologist.     Abdominal Pain    Many things can cause abdominal pain. Many times, abdominal pain is not caused by a disease and will improve without treatment. Your health care provider will do a physical exam to determine if there is a dangerous cause of your pain; blood tests and imaging may help determine the cause of your pain. However, in many cases, no cause may be found and you may need further testing as an outpatient. Monitor your abdominal pain for any changes.     SEEK IMMEDIATE MEDICAL CARE IF YOU HAVE ANY OF THE FOLLOWING SYMPTOMS: worsening abdominal pain, uncontrollable vomiting, profuse diarrhea, inability to have bowel movements or pass gas, black or bloody stools, fever accompanying chest pain or back pain, or fainting. These symptoms may represent a serious problem that is an emergency. Do not wait to see if the symptoms will go away. Get medical help right away. Call 911 and do not drive yourself to the hospital.

## 2025-02-24 NOTE — ED PROVIDER NOTE - PATIENT PORTAL LINK FT
You can access the FollowMyHealth Patient Portal offered by [facility] by registering at the following website: [email or web address]. By joining [employer]’s FollowMyHealth portal, you will also be able to view your health information using other applications (apps) compatible with our system.

## 2025-02-24 NOTE — ED PROVIDER NOTE - PHYSICAL EXAMINATION
VITAL SIGNS: I have reviewed nursing notes and confirm.  CONSTITUTIONAL: uncomfortable-appearing, non-toxic, NAD  SKIN: Warm dry  HEAD: NCAT  EYES: EOMI, PERRL  ENT: Moist mucous membranes, normal pharynx with no erythema or exudates  NECK: Supple  CARD: RRR, no murmurs, rubs or gallops  RESP: clear to ausculation b/l.  No rales, rhonchi, or wheezing.  ABD: soft, + RLQ TTP, non-distended, no rebound or guarding. No CVA tenderness  EXT: Full ROM  NEURO: Grossly intact.   PSYCH: Cooperative, appropriate.

## 2025-03-21 ENCOUNTER — APPOINTMENT (OUTPATIENT)
Dept: PLASTIC SURGERY | Facility: CLINIC | Age: 55
End: 2025-03-21
Payer: COMMERCIAL

## 2025-03-21 VITALS — WEIGHT: 125 LBS | HEIGHT: 63 IN | BODY MASS INDEX: 22.15 KG/M2

## 2025-03-21 PROCEDURE — 99203 OFFICE O/P NEW LOW 30 MIN: CPT

## 2025-04-01 ENCOUNTER — NON-APPOINTMENT (OUTPATIENT)
Age: 55
End: 2025-04-01

## 2025-04-01 DIAGNOSIS — N64.89 OTHER SPECIFIED DISORDERS OF BREAST: ICD-10-CM

## 2025-04-17 ENCOUNTER — NON-APPOINTMENT (OUTPATIENT)
Age: 55
End: 2025-04-17

## 2025-07-21 ENCOUNTER — APPOINTMENT (OUTPATIENT)
Dept: PLASTIC SURGERY | Facility: CLINIC | Age: 55
End: 2025-07-21
Payer: COMMERCIAL

## 2025-07-21 PROCEDURE — 99213 OFFICE O/P EST LOW 20 MIN: CPT
